# Patient Record
Sex: FEMALE | Race: WHITE | Employment: OTHER | URBAN - METROPOLITAN AREA
[De-identification: names, ages, dates, MRNs, and addresses within clinical notes are randomized per-mention and may not be internally consistent; named-entity substitution may affect disease eponyms.]

---

## 2022-06-12 ENCOUNTER — APPOINTMENT (OUTPATIENT)
Dept: GENERAL RADIOLOGY | Age: 77
DRG: 378 | End: 2022-06-12
Payer: MEDICARE

## 2022-06-12 ENCOUNTER — HOSPITAL ENCOUNTER (INPATIENT)
Age: 77
LOS: 6 days | Discharge: HOME | DRG: 378 | End: 2022-06-18
Attending: STUDENT IN AN ORGANIZED HEALTH CARE EDUCATION/TRAINING PROGRAM | Admitting: INTERNAL MEDICINE
Payer: MEDICARE

## 2022-06-12 DIAGNOSIS — D64.9 ANEMIA, UNSPECIFIED TYPE: Primary | ICD-10-CM

## 2022-06-12 PROBLEM — D72.829 LEUKOCYTOSIS (LEUCOCYTOSIS): Status: ACTIVE | Noted: 2022-06-12

## 2022-06-12 PROBLEM — J44.9 COPD (CHRONIC OBSTRUCTIVE PULMONARY DISEASE) (HCC): Status: ACTIVE | Noted: 2022-06-12

## 2022-06-12 PROBLEM — E03.9 HYPOTHYROIDISM: Status: ACTIVE | Noted: 2022-06-12

## 2022-06-12 LAB
ABSOLUTE EOS #: 0 K/UL (ref 0–0.4)
ABSOLUTE LYMPH #: 1.89 K/UL (ref 1–4.8)
ABSOLUTE MONO #: 1.05 K/UL (ref 0.1–1.3)
ABSOLUTE RETIC #: 0.2 M/UL (ref 0.02–0.1)
ALBUMIN SERPL-MCNC: 3.7 G/DL (ref 3.5–5.2)
ALLEN TEST: ABNORMAL
ALP BLD-CCNC: 76 U/L (ref 35–104)
ALT SERPL-CCNC: 21 U/L (ref 5–33)
ANION GAP SERPL CALCULATED.3IONS-SCNC: 13 MMOL/L (ref 9–17)
AST SERPL-CCNC: 15 U/L
BASOPHILS # BLD: 0 % (ref 0–2)
BASOPHILS ABSOLUTE: 0 K/UL (ref 0–0.2)
BILIRUB SERPL-MCNC: <0.15 MG/DL (ref 0.3–1.2)
BUN BLDV-MCNC: 50 MG/DL (ref 8–23)
C-REACTIVE PROTEIN: 4.2 MG/L (ref 0–5)
CALCIUM SERPL-MCNC: 9 MG/DL (ref 8.6–10.4)
CARBOXYHEMOGLOBIN: 5 % (ref 0–5)
CHLORIDE BLD-SCNC: 99 MMOL/L (ref 98–107)
CO2: 19 MMOL/L (ref 20–31)
CREAT SERPL-MCNC: 1 MG/DL (ref 0.5–0.9)
EOSINOPHILS RELATIVE PERCENT: 0 % (ref 0–4)
GFR AFRICAN AMERICAN: >60 ML/MIN
GFR NON-AFRICAN AMERICAN: 54 ML/MIN
GFR SERPL CREATININE-BSD FRML MDRD: ABNORMAL ML/MIN/{1.73_M2}
GLUCOSE BLD-MCNC: 134 MG/DL (ref 70–99)
HCO3 VENOUS: 19.3 MMOL/L (ref 24–30)
HCT VFR BLD CALC: 14.2 % (ref 36–46)
HCT VFR BLD CALC: 23.4 % (ref 36–46)
HEMOGLOBIN: 4.4 G/DL (ref 12–16)
HEMOGLOBIN: 8.1 G/DL (ref 12–16)
INFLUENZA A: NOT DETECTED
INFLUENZA B: NOT DETECTED
INR BLD: 1.1
LACTIC ACID: 1.8 MMOL/L (ref 0.5–2.2)
LYMPHOCYTES # BLD: 9 % (ref 24–44)
MCH RBC QN AUTO: 31.1 PG (ref 26–34)
MCHC RBC AUTO-ENTMCNC: 30.6 G/DL (ref 31–37)
MCV RBC AUTO: 101.7 FL (ref 80–100)
METHEMOGLOBIN: 0 % (ref 0–1.9)
MONOCYTES # BLD: 5 % (ref 1–7)
MORPHOLOGY: ABNORMAL
NEGATIVE BASE EXCESS, VEN: 6.3 MMOL/L (ref 0–2)
O2 SAT, VEN: 75.2 % (ref 60–85)
PARTIAL THROMBOPLASTIN TIME: 36 SEC (ref 24–36)
PATIENT TEMP: 37
PCO2, VEN: 34.8 (ref 39–55)
PDW BLD-RTO: 15.4 % (ref 11.5–14.9)
PH VENOUS: 7.35 (ref 7.32–7.42)
PLATELET # BLD: 488 K/UL (ref 150–450)
PMV BLD AUTO: 7.1 FL (ref 6–12)
PO2, VEN: 46.3 (ref 30–50)
POTASSIUM SERPL-SCNC: 4.3 MMOL/L (ref 3.7–5.3)
PROTHROMBIN TIME: 14.2 SEC (ref 11.8–14.6)
RBC # BLD: 1.4 M/UL (ref 4–5.2)
RETIC %: 14.3 % (ref 0.5–2)
SAMPLE SITE: ABNORMAL
SARS-COV-2 RNA, RT PCR: NOT DETECTED
SEG NEUTROPHILS: 86 % (ref 36–66)
SEGMENTED NEUTROPHILS ABSOLUTE COUNT: 18.06 K/UL (ref 1.3–9.1)
SODIUM BLD-SCNC: 131 MMOL/L (ref 135–144)
SOURCE: NORMAL
SPECIMEN DESCRIPTION: NORMAL
THYROXINE, FREE: 1.09 NG/DL (ref 0.93–1.7)
TOTAL PROTEIN: 6.1 G/DL (ref 6.4–8.3)
TROPONIN, HIGH SENSITIVITY: 18 NG/L (ref 0–14)
TSH SERPL DL<=0.05 MIU/L-ACNC: 11.58 UIU/ML (ref 0.3–5)
WBC # BLD: 21 K/UL (ref 3.5–11)

## 2022-06-12 PROCEDURE — 82805 BLOOD GASES W/O2 SATURATION: CPT

## 2022-06-12 PROCEDURE — 6360000002 HC RX W HCPCS: Performed by: STUDENT IN AN ORGANIZED HEALTH CARE EDUCATION/TRAINING PROGRAM

## 2022-06-12 PROCEDURE — 93005 ELECTROCARDIOGRAM TRACING: CPT | Performed by: STUDENT IN AN ORGANIZED HEALTH CARE EDUCATION/TRAINING PROGRAM

## 2022-06-12 PROCEDURE — 84439 ASSAY OF FREE THYROXINE: CPT

## 2022-06-12 PROCEDURE — 71045 X-RAY EXAM CHEST 1 VIEW: CPT

## 2022-06-12 PROCEDURE — 85025 COMPLETE CBC W/AUTO DIFF WBC: CPT

## 2022-06-12 PROCEDURE — 86900 BLOOD TYPING SEROLOGIC ABO: CPT

## 2022-06-12 PROCEDURE — 85045 AUTOMATED RETICULOCYTE COUNT: CPT

## 2022-06-12 PROCEDURE — 87636 SARSCOV2 & INF A&B AMP PRB: CPT

## 2022-06-12 PROCEDURE — 86901 BLOOD TYPING SEROLOGIC RH(D): CPT

## 2022-06-12 PROCEDURE — 84443 ASSAY THYROID STIM HORMONE: CPT

## 2022-06-12 PROCEDURE — 99285 EMERGENCY DEPT VISIT HI MDM: CPT

## 2022-06-12 PROCEDURE — 36415 COLL VENOUS BLD VENIPUNCTURE: CPT

## 2022-06-12 PROCEDURE — C9113 INJ PANTOPRAZOLE SODIUM, VIA: HCPCS | Performed by: STUDENT IN AN ORGANIZED HEALTH CARE EDUCATION/TRAINING PROGRAM

## 2022-06-12 PROCEDURE — 86140 C-REACTIVE PROTEIN: CPT

## 2022-06-12 PROCEDURE — 85730 THROMBOPLASTIN TIME PARTIAL: CPT

## 2022-06-12 PROCEDURE — 6370000000 HC RX 637 (ALT 250 FOR IP): Performed by: INTERNAL MEDICINE

## 2022-06-12 PROCEDURE — P9016 RBC LEUKOCYTES REDUCED: HCPCS

## 2022-06-12 PROCEDURE — 2580000003 HC RX 258

## 2022-06-12 PROCEDURE — 6370000000 HC RX 637 (ALT 250 FOR IP)

## 2022-06-12 PROCEDURE — 85014 HEMATOCRIT: CPT

## 2022-06-12 PROCEDURE — 86920 COMPATIBILITY TEST SPIN: CPT

## 2022-06-12 PROCEDURE — 85610 PROTHROMBIN TIME: CPT

## 2022-06-12 PROCEDURE — C9113 INJ PANTOPRAZOLE SODIUM, VIA: HCPCS

## 2022-06-12 PROCEDURE — A4216 STERILE WATER/SALINE, 10 ML: HCPCS

## 2022-06-12 PROCEDURE — 96374 THER/PROPH/DIAG INJ IV PUSH: CPT

## 2022-06-12 PROCEDURE — 2060000000 HC ICU INTERMEDIATE R&B

## 2022-06-12 PROCEDURE — 85018 HEMOGLOBIN: CPT

## 2022-06-12 PROCEDURE — 6360000002 HC RX W HCPCS

## 2022-06-12 PROCEDURE — 86850 RBC ANTIBODY SCREEN: CPT

## 2022-06-12 PROCEDURE — 2580000003 HC RX 258: Performed by: STUDENT IN AN ORGANIZED HEALTH CARE EDUCATION/TRAINING PROGRAM

## 2022-06-12 PROCEDURE — 80053 COMPREHEN METABOLIC PANEL: CPT

## 2022-06-12 PROCEDURE — 83605 ASSAY OF LACTIC ACID: CPT

## 2022-06-12 PROCEDURE — 82800 BLOOD PH: CPT

## 2022-06-12 PROCEDURE — 84484 ASSAY OF TROPONIN QUANT: CPT

## 2022-06-12 RX ORDER — ACETAMINOPHEN 325 MG/1
650 TABLET ORAL EVERY 6 HOURS PRN
Status: DISCONTINUED | OUTPATIENT
Start: 2022-06-12 | End: 2022-06-18 | Stop reason: HOSPADM

## 2022-06-12 RX ORDER — SODIUM CHLORIDE 0.9 % (FLUSH) 0.9 %
5-40 SYRINGE (ML) INJECTION PRN
Status: DISCONTINUED | OUTPATIENT
Start: 2022-06-12 | End: 2022-06-18 | Stop reason: HOSPADM

## 2022-06-12 RX ORDER — IBUPROFEN 600 MG/1
600 TABLET ORAL EVERY 6 HOURS PRN
COMMUNITY
End: 2022-06-12

## 2022-06-12 RX ORDER — ATORVASTATIN CALCIUM 20 MG/1
20 TABLET, FILM COATED ORAL DAILY
COMMUNITY
End: 2022-06-22 | Stop reason: SDUPTHER

## 2022-06-12 RX ORDER — SODIUM CHLORIDE 9 MG/ML
INJECTION, SOLUTION INTRAVENOUS PRN
Status: COMPLETED | OUTPATIENT
Start: 2022-06-12 | End: 2022-06-13

## 2022-06-12 RX ORDER — LEVOTHYROXINE SODIUM 88 UG/1
88 TABLET ORAL DAILY
Status: DISCONTINUED | OUTPATIENT
Start: 2022-06-13 | End: 2022-06-18 | Stop reason: HOSPADM

## 2022-06-12 RX ORDER — AMLODIPINE BESYLATE 5 MG/1
5 TABLET ORAL DAILY
Status: CANCELLED | OUTPATIENT
Start: 2022-06-13

## 2022-06-12 RX ORDER — ONDANSETRON 2 MG/ML
4 INJECTION INTRAMUSCULAR; INTRAVENOUS EVERY 6 HOURS PRN
Status: DISCONTINUED | OUTPATIENT
Start: 2022-06-12 | End: 2022-06-18 | Stop reason: HOSPADM

## 2022-06-12 RX ORDER — ALBUTEROL SULFATE 2.5 MG/3ML
2.5 SOLUTION RESPIRATORY (INHALATION) EVERY 6 HOURS PRN
Status: DISCONTINUED | OUTPATIENT
Start: 2022-06-12 | End: 2022-06-18 | Stop reason: HOSPADM

## 2022-06-12 RX ORDER — 0.9 % SODIUM CHLORIDE 0.9 %
1000 INTRAVENOUS SOLUTION INTRAVENOUS ONCE
Status: COMPLETED | OUTPATIENT
Start: 2022-06-12 | End: 2022-06-12

## 2022-06-12 RX ORDER — ALBUTEROL SULFATE 2.5 MG/3ML
2.5 SOLUTION RESPIRATORY (INHALATION) EVERY 6 HOURS PRN
Status: ON HOLD | COMMUNITY
End: 2022-06-18 | Stop reason: SDUPTHER

## 2022-06-12 RX ORDER — SODIUM CHLORIDE 9 MG/ML
INJECTION, SOLUTION INTRAVENOUS PRN
Status: DISCONTINUED | OUTPATIENT
Start: 2022-06-12 | End: 2022-06-18 | Stop reason: HOSPADM

## 2022-06-12 RX ORDER — LISINOPRIL 10 MG/1
10 TABLET ORAL DAILY
COMMUNITY
End: 2022-06-22 | Stop reason: SDUPTHER

## 2022-06-12 RX ORDER — SODIUM CHLORIDE 9 MG/ML
INJECTION, SOLUTION INTRAVENOUS CONTINUOUS
Status: DISCONTINUED | OUTPATIENT
Start: 2022-06-12 | End: 2022-06-18 | Stop reason: HOSPADM

## 2022-06-12 RX ORDER — LANOLIN ALCOHOL/MO/W.PET/CERES
3 CREAM (GRAM) TOPICAL NIGHTLY PRN
Status: DISCONTINUED | OUTPATIENT
Start: 2022-06-12 | End: 2022-06-18 | Stop reason: HOSPADM

## 2022-06-12 RX ORDER — POLYETHYLENE GLYCOL 3350 17 G/17G
17 POWDER, FOR SOLUTION ORAL DAILY PRN
Status: DISCONTINUED | OUTPATIENT
Start: 2022-06-12 | End: 2022-06-18 | Stop reason: HOSPADM

## 2022-06-12 RX ORDER — LEVOTHYROXINE SODIUM 88 UG/1
88 TABLET ORAL DAILY
COMMUNITY
End: 2022-06-22 | Stop reason: SDUPTHER

## 2022-06-12 RX ORDER — ONDANSETRON 4 MG/1
4 TABLET, ORALLY DISINTEGRATING ORAL EVERY 8 HOURS PRN
Status: DISCONTINUED | OUTPATIENT
Start: 2022-06-12 | End: 2022-06-18 | Stop reason: HOSPADM

## 2022-06-12 RX ORDER — AMLODIPINE BESYLATE 5 MG/1
5 TABLET ORAL DAILY
COMMUNITY
End: 2022-06-22 | Stop reason: SDUPTHER

## 2022-06-12 RX ORDER — ATORVASTATIN CALCIUM 20 MG/1
20 TABLET, FILM COATED ORAL DAILY
Status: DISCONTINUED | OUTPATIENT
Start: 2022-06-12 | End: 2022-06-18 | Stop reason: HOSPADM

## 2022-06-12 RX ORDER — ACETAMINOPHEN 650 MG/1
650 SUPPOSITORY RECTAL EVERY 6 HOURS PRN
Status: DISCONTINUED | OUTPATIENT
Start: 2022-06-12 | End: 2022-06-18 | Stop reason: HOSPADM

## 2022-06-12 RX ORDER — LISINOPRIL 20 MG/1
10 TABLET ORAL DAILY
Status: CANCELLED | OUTPATIENT
Start: 2022-06-13

## 2022-06-12 RX ORDER — PANTOPRAZOLE SODIUM 40 MG/1
40 TABLET, DELAYED RELEASE ORAL
Status: DISCONTINUED | OUTPATIENT
Start: 2022-06-15 | End: 2022-06-12

## 2022-06-12 RX ORDER — SODIUM CHLORIDE 0.9 % (FLUSH) 0.9 %
5-40 SYRINGE (ML) INJECTION EVERY 12 HOURS SCHEDULED
Status: DISCONTINUED | OUTPATIENT
Start: 2022-06-12 | End: 2022-06-18 | Stop reason: HOSPADM

## 2022-06-12 RX ADMIN — SODIUM CHLORIDE 40 MG: 9 INJECTION INTRAMUSCULAR; INTRAVENOUS; SUBCUTANEOUS at 23:00

## 2022-06-12 RX ADMIN — SODIUM CHLORIDE: 9 INJECTION, SOLUTION INTRAVENOUS at 22:57

## 2022-06-12 RX ADMIN — Medication 3 MG: at 23:07

## 2022-06-12 RX ADMIN — SODIUM CHLORIDE 1000 ML: 9 INJECTION, SOLUTION INTRAVENOUS at 13:56

## 2022-06-12 RX ADMIN — PANTOPRAZOLE SODIUM 80 MG: 40 INJECTION, POWDER, FOR SOLUTION INTRAVENOUS at 16:28

## 2022-06-12 RX ADMIN — CEFTRIAXONE SODIUM 1000 MG: 1 INJECTION, POWDER, FOR SOLUTION INTRAMUSCULAR; INTRAVENOUS at 22:13

## 2022-06-12 RX ADMIN — PANTOPRAZOLE SODIUM 8 MG/HR: 40 INJECTION, POWDER, FOR SOLUTION INTRAVENOUS at 16:50

## 2022-06-12 ASSESSMENT — ENCOUNTER SYMPTOMS
COUGH: 0
NAUSEA: 0
ABDOMINAL PAIN: 0
DIARRHEA: 0
BACK PAIN: 1
ABDOMINAL DISTENTION: 0
ANAL BLEEDING: 0
BLOOD IN STOOL: 1
RHINORRHEA: 0
SHORTNESS OF BREATH: 1
VOMITING: 0
ABDOMINAL PAIN: 1

## 2022-06-12 ASSESSMENT — PAIN DESCRIPTION - PAIN TYPE: TYPE: ACUTE PAIN

## 2022-06-12 ASSESSMENT — PAIN DESCRIPTION - ORIENTATION: ORIENTATION: LOWER;MID

## 2022-06-12 ASSESSMENT — PAIN - FUNCTIONAL ASSESSMENT: PAIN_FUNCTIONAL_ASSESSMENT: 0-10

## 2022-06-12 ASSESSMENT — PAIN DESCRIPTION - DESCRIPTORS: DESCRIPTORS: DISCOMFORT

## 2022-06-12 ASSESSMENT — PAIN DESCRIPTION - LOCATION: LOCATION: BACK

## 2022-06-12 ASSESSMENT — PAIN DESCRIPTION - FREQUENCY: FREQUENCY: CONTINUOUS

## 2022-06-12 ASSESSMENT — PAIN SCALES - GENERAL: PAINLEVEL_OUTOF10: 4

## 2022-06-12 NOTE — PROGRESS NOTES
Pt arrived to floor from ED to room 2112. Vitals taken and telemetry applied. No distress noted. Family at bedside. Second unit of blood infusing. Call light within reach, and pt educated on its use. Bed in lowest position, and locked. Side rails up x 2. Denied further questions or needs at this time. Will continue to monitor.

## 2022-06-12 NOTE — ED PROVIDER NOTES
level: Not on file   Occupational History    Not on file   Tobacco Use    Smoking status: Current Every Day Smoker     Types: Cigarettes    Smokeless tobacco: Never Used   Substance and Sexual Activity    Alcohol use: Yes     Alcohol/week: 2.0 standard drinks     Types: 2 Glasses of wine per week    Drug use: Never    Sexual activity: Not on file   Other Topics Concern    Not on file   Social History Narrative    Not on file     Social Determinants of Health     Financial Resource Strain:     Difficulty of Paying Living Expenses: Not on file   Food Insecurity:     Worried About Running Out of Food in the Last Year: Not on file    Trung of Food in the Last Year: Not on file   Transportation Needs:     Lack of Transportation (Medical): Not on file    Lack of Transportation (Non-Medical): Not on file   Physical Activity:     Days of Exercise per Week: Not on file    Minutes of Exercise per Session: Not on file   Stress:     Feeling of Stress : Not on file   Social Connections:     Frequency of Communication with Friends and Family: Not on file    Frequency of Social Gatherings with Friends and Family: Not on file    Attends Restoration[employer] Services: Not on file    Active Member of [address] [place] [address] or Organizations: Not on file    Attends Club or Organization Meetings: Not on file    Marital Status: Not on file   Intimate Partner Violence:     Fear of Current or Ex-Partner: Not on file    Emotionally Abused: Not on file    Physically Abused: Not on file    Sexually Abused: Not on file   Housing Stability:     Unable to Pay for Housing in the Last Year: Not on file    Number of [place] in the Last Year: Not on file    Unstable Housing in the Last Year: Not on file       History reviewed. No pertinent family history. Allergies:    Patient has no known allergies. Home Medications:  Prior to Admission medications    Medication Sig Start Date End Date Taking?  Authorizing Provider   levothyroxine (SYNTHROID) 88 MCG tablet Take 88 mcg by mouth Daily   Yes Historical Provider, MD   amLODIPine (NORVASC) 5 MG tablet Take 5 mg by mouth daily   Yes Historical Provider, MD   atorvastatin (LIPITOR) 20 MG tablet Take 20 mg by mouth daily   Yes Historical Provider, MD   lisinopril (PRINIVIL;ZESTRIL) 10 MG tablet Take 10 mg by mouth daily   Yes Historical Provider, MD   tiotropium (SPIRIVA) 18 MCG inhalation capsule Inhale 18 mcg into the lungs daily   Yes Historical Provider, MD   albuterol (PROVENTIL) (2.5 MG/3ML) 0.083% nebulizer solution Take 2.5 mg by nebulization every 6 hours as needed for Wheezing   Yes Historical Provider, MD       REVIEW OF SYSTEMS    (2-9 systems for level 4, 10 or more for level 5)    Review of Systems   Constitutional: Positive for appetite change, chills and fatigue. Negative for fever. HENT: Negative for congestion and rhinorrhea. Respiratory: Positive for shortness of breath. Negative for cough. Cardiovascular: Negative for chest pain. Gastrointestinal: Positive for blood in stool. Negative for abdominal pain, anal bleeding, nausea and vomiting. Genitourinary: Negative for dysuria and flank pain. Musculoskeletal: Positive for back pain. Negative for myalgias. Skin: Positive for pallor. Neurological: Negative for headaches. All other systems reviewed and are negative. PHYSICAL EXAM   (up to 7 for level 4, 8 or more for level 5)    INITIAL VITALS:   ED Triage Vitals   BP Temp Temp Source Heart Rate Resp SpO2 Height Weight   06/12/22 1340 06/12/22 1346 06/12/22 1346 06/12/22 1340 06/12/22 1340 06/12/22 1340 06/12/22 1346 06/12/22 1346   (!) 115/54 98.2 °F (36.8 °C) Oral 88 26 99 % 5' 2\" (1.575 m) 125 lb (56.7 kg)       Physical Exam  Vitals and nursing note reviewed. Constitutional:       General: She is not in acute distress. Appearance: She is well-developed. She is ill-appearing. She is not toxic-appearing.    HENT:      Right Ear: A middle ear effusion is present. Tympanic membrane is bulging. Left Ear:  No middle ear effusion. Tympanic membrane is bulging. Cardiovascular:      Rate and Rhythm: Normal rate. Rhythm irregular. Extrasystoles are present. Pulses:           Radial pulses are 2+ on the right side and 2+ on the left side. Posterior tibial pulses are 2+ on the right side and 2+ on the left side. Pulmonary:      Effort: Pulmonary effort is normal. No respiratory distress. Breath sounds: Wheezing present. No decreased breath sounds, rhonchi or rales. Comments: Scattered wheezing throughout all lung fields  Abdominal:      General: Bowel sounds are normal. There is no distension. Palpations: Abdomen is soft. Tenderness: There is no abdominal tenderness. Musculoskeletal:      Right lower leg: No edema. Left lower leg: No edema. Skin:     General: Skin is warm and dry. Capillary Refill: Capillary refill takes 2 to 3 seconds. Coloration: Skin is pale. Neurological:      Mental Status: She is alert and oriented to person, place, and time. Psychiatric:         Behavior: Behavior is cooperative. DIFFERENTIAL  DIAGNOSIS   PLAN (LABS / IMAGING / EKG):  Orders Placed This Encounter   Procedures    COVID-19 & Influenza Combo    XR CHEST PORTABLE    US RENAL COMPLETE    CBC with Auto Differential    Comprehensive Metabolic Panel    Troponin    Protime-INR    APTT    OCCULT BLOOD SCREEN    Blood Gas, Venous    Lactic Acid    TSH with Reflex    Path Review, Smear    T4, Free    Urinalysis with Reflex to Culture    Hemoglobin and Hematocrit    Reticulocytes    Basic Metabolic Panel w/ Reflex to MG    CBC with Auto Differential    Hemoglobin and Hematocrit    Troponin    C-Reactive Protein    Iron and TIBC    Ferritin    Hemoglobin and Hematocrit    ADULT DIET;  Clear Liquid    Diet NPO Exceptions are: Ice Chips, Sips of Water with Meds    Verify hospital blood product consent form has been signed and witnessed    Vital Signs For Blood Product Transfusion    Transfusion Reaction Management    Vital signs per unit routine    Telemetry monitoring - continuous duration    Up as tolerated    Full Code    Inpatient consult to Internal Medicine    Inpatient consult to GI    Inpatient consult to Social Work    OT eval and treat    PT evaluation and treat    Initiate Oxygen Therapy Protocol    EKG 12 Lead    TYPE AND SCREEN    PREPARE RBC (CROSSMATCH), 4 Units    ADMIT TO INPATIENT       MEDICATIONS ORDERED:  Orders Placed This Encounter   Medications    0.9 % sodium chloride bolus    0.9 % sodium chloride infusion    pantoprazole (PROTONIX) 80 mg in sodium chloride 0.9 % 50 mL bolus    DISCONTD: pantoprazole (PROTONIX) 80 mg in sodium chloride 0.9 % 100 mL infusion    sodium chloride flush 0.9 % injection 5-40 mL    sodium chloride flush 0.9 % injection 5-40 mL    0.9 % sodium chloride infusion    OR Linked Order Group     ondansetron (ZOFRAN-ODT) disintegrating tablet 4 mg     ondansetron (ZOFRAN) injection 4 mg    polyethylene glycol (GLYCOLAX) packet 17 g    OR Linked Order Group     acetaminophen (TYLENOL) tablet 650 mg     acetaminophen (TYLENOL) suppository 650 mg    DISCONTD: pantoprazole (PROTONIX) tablet 40 mg    cefTRIAXone (ROCEPHIN) 1000 mg IVPB in NS 50ml minibag     Order Specific Question:   Antimicrobial Indications     Answer:    Other     Order Specific Question:   Other Abx Indication     Answer:   Elevated WBC of unkown cause    albuterol (PROVENTIL) nebulizer solution 2.5 mg     Order Specific Question:   Initiate RT Bronchodilator Protocol     Answer:   No    atorvastatin (LIPITOR) tablet 20 mg    levothyroxine (SYNTHROID) tablet 88 mcg    tiotropium (SPIRIVA RESPIMAT) 2.5 MCG/ACT inhaler 2 puff    0.9 % sodium chloride infusion    DISCONTD: pantoprazole (PROTONIX) 40 mg in sodium chloride (PF) 10 mL injection    pantoprazole (PROTONIX) 40 mg in sodium chloride (PF) 10 mL injection         DIAGNOSTIC RESULTS / EMERGENCYDEPARTMENT COURSE / MDM   LABS:  Labs Reviewed   CBC WITH AUTO DIFFERENTIAL - Abnormal; Notable for the following components:       Result Value    WBC 21.0 (*)     RBC 1.40 (*)     Hemoglobin 4.4 (*)     Hematocrit 14.2 (*)     .7 (*)     MCHC 30.6 (*)     RDW 15.4 (*)     Platelets 266 (*)     Seg Neutrophils 86 (*)     Lymphocytes 9 (*)     Segs Absolute 18.06 (*)     All other components within normal limits   COMPREHENSIVE METABOLIC PANEL - Abnormal; Notable for the following components:    Glucose 134 (*)     BUN 50 (*)     CREATININE 1.00 (*)     Sodium 131 (*)     CO2 19 (*)     Total Bilirubin <0.15 (*)     Total Protein 6.1 (*)     GFR Non- 54 (*)     All other components within normal limits   TROPONIN - Abnormal; Notable for the following components:    Troponin, High Sensitivity 18 (*)     All other components within normal limits   BLOOD GAS, VENOUS - Abnormal; Notable for the following components:    pCO2, Jorge 34.8 (*)     HCO3, Venous 19.3 (*)     Negative Base Excess, Jorge 6.3 (*)     All other components within normal limits   TSH WITH REFLEX - Abnormal; Notable for the following components:    TSH 11.58 (*)     All other components within normal limits   RETICULOCYTES - Abnormal; Notable for the following components:    Retic % 14.3 (*)     Absolute Retic # 0.195 (*)     All other components within normal limits   COVID-19 & INFLUENZA COMBO   PROTIME-INR   APTT   LACTIC ACID   T4, FREE   C-REACTIVE PROTEIN   OCCULT BLOOD SCREEN   PERIPHERAL BLOOD SMEAR, PATH REVIEW   URINALYSIS WITH REFLEX TO CULTURE   BASIC METABOLIC PANEL W/ REFLEX TO MG FOR LOW K   CBC WITH AUTO DIFFERENTIAL   HEMOGLOBIN AND HEMATOCRIT   TROPONIN   IRON AND TIBC   FERRITIN   HEMOGLOBIN AND HEMATOCRIT   TYPE AND SCREEN   PREPARE RBC (CROSSMATCH)       RADIOLOGY:  XR CHEST PORTABLE    Result Date: 6/12/2022  EXAMINATION: ONE XRAY VIEW OF THE CHEST 6/12/2022 2:00 pm COMPARISON: None. HISTORY: ORDERING SYSTEM PROVIDED HISTORY: shortenss of breath, hx copd TECHNOLOGIST PROVIDED HISTORY: shortenss of breath, hx copd FINDINGS: Lungs are hyperinflated suggesting COPD. No focal consolidation. No pneumothorax or pleural effusion. Cardiac and mediastinal silhouettes unremarkable. No acute osseous abnormality. Generalized osteopenia. Telemetry leads overlie the chest.     COPD. No acute findings. EKG    EKG Interpretation    Interpreted by me    Rhythm: normal sinus   Rate: normal, 85  Axis: normal  Ectopy: none  Conduction: normal  ST Segments: no acute change  T Waves: Flattening in multiple leads, upright T waves in V1  Q Waves: none    Clinical Impression: Sinus rhythm rate of 85. No ST segment changes, no arrhythmia, no ectopy. Normal axis, good R wave progression. Upright T waves V1, larger than T wave in V6        All EKG's are interpreted by the Emergency Department Physician whoeither signs or Co-signs this chart in the absence of a cardiologist.    Impression:  Patient presents with fatigue, shortness of breath, weakness. Worsening over the past few days. Patient is quite pale. We did have difficulty obtaining pulse ox on patient but on 3 L nasal cannula pulse ox is in the high 90s. Off of nasal cannula, pulse ox drops into the low 90s/high 80s. She does have conjunctival pallor as well as diffuse pallor. Not tachycardic, not tachypneic. Does have scattered wheezing. Endorses a strain to her back several weeks ago has been taking Motrin since then. Concern for NSAID overuse, possible GI bleed as well. Lab work-up, chest x-ray, bedside abdominal ultrasound and reassessment. Likely admission      EMERGENCY DEPARTMENT COURSE:  ED Course as of 06/12/22 2117   Sun Jun 12, 2022   1422 Slightly elevated carboxyhemoglobin however patient is an active smoker. Hemoglobin 4.4, plan for transfusion and admission.   Bedside FAST exam negative. Right kidney demonstrating either severe hydronephrosis versus multiple complex renal cysts. [AP]   1520 SARS-CoV-2 RNA, RT PCR: Not Detected [AP]   5004 INFLUENZA A: Not Detected [AP]   4160 EOHBTMJOR B: Not Detected [AP]      ED Course User Index  [AP] Maximilian Drew DO     Hemoglobin low, order transfusion. Spoke with internal medicine who accepted patient to her service. Started patient on Protonix infusion. Has no history of cirrhosis therefore octreotide was not ordered. Patient remained hemodynamically stable and then was admitted to the floor. PROCEDURES:  FAST EXAM:    A limited, bedside FAST exam was performed. The medical necessity was to evaluate for the presence or absence of intraperitoneal or pericardial fluid. The structures studied were the hepatorenal space, splenorenal space, pericardium, and bladder. FINDINGS:  Negative for free intra-abdominal fluid. The study was technically adequate. IMAGES:  Electronically uploaded to the PACS system    RENAL ULTRASOUND:     A limited, bedside ultrasound of the kidneys was performed. The medical necessity was to evaluate for hydronephrosis in a patient with possible renal colic. The structures studied were the kidney, including the renal cortex and collecting system. FINDINGS:    negative for left hydronephrosis. Either hydronephrosis vs multiple complex renal cysts on right  The study was technically adequate. IMAGES:  Electronically uploaded to the PACS system      CONSULTS:  [place] TO GI  IP CONSULT TO SOCIAL WORK    CRITICAL CARE:  There was a high probability of clinically significant/life threatening deterioration in this patient's condition which required my urgent intervention. Total critical care time was 20 minutes. This excludes any time for separately reportable procedures. FINAL IMPRESSION     1.  Anemia, unspecified type          DISPOSITION / PLAN DISPOSITION Admitted 06/12/2022 04:41:53 PM      PATIENT REFERRED TO:  No follow-up provider specified.     DISCHARGE MEDICATIONS:  Current Discharge Medication List          Kieran Vitale DO  Emergency Medicine Attending    (Please note that portions of this note were completed with a voice recognition program.  Efforts were made to edit the dictations but occasionally words are mis-transcribed.)         Kieran Vitale DO  06/12/22 3013

## 2022-06-12 NOTE — CONSENT
Informed Consent for Blood Component Transfusion Note    I have discussed with the patient the rationale for blood component transfusion; its benefits in treating or preventing fatigue, organ damage, or death; and its risk which includes mild transfusion reactions, rare risk of blood borne infection, or more serious but rare reactions. I have discussed the alternatives to transfusion, including the risk and consequences of not receiving transfusion. The patient had an opportunity to ask questions and had agreed to proceed with transfusion of blood components.     Electronically signed by Ronald Alan DO on 6/12/22 at 3:43 PM EDT

## 2022-06-12 NOTE — H&P
[address]      [address] [place] [address]     HISTORY AND PHYSICAL EXAMINATION            Date:   6/12/2022  Patient name:  Kaci Parker  Date of admission:  6/12/2022  1:35 PM  MRN:   [id number]  Account:  [de-identified]  YOB: 1945  PCP:    Delfino Celaya MD  Room:   06/06  Code Status:    No Order    Chief Complaint:     Chief Complaint   Patient presents with    Fatigue    Shortness of Breath       History Obtained From:     patient    History of Present Illness: The patient is a 65 y.o. Non- / non  female who with past medical history of hypertension, COPD, hypothyroidism presents with abdominal pain and fatigue that started around a month ago. Patient said that she started to take Motrin for her back pain around the beginning of May. Since then, she has been having stomach pain that started after she takes the Motrin and lasts for couple of hours. Patient said that she was taking ibuprofen 600 more than 1 time a day, alternating with Tylenol. She also reported melena, headache, dizziness, shortness of breath, fatigue. Denies any fever, chills, chest pain, hematemesis, diarrhea or constipation. Patient said that she went to [facility] days ago and was discharged from the ED. She had a negative head CT scan at that time. At ER, patient is vitally stable. Hemoglobin was found to be 4.4, WBC 21, normal platelet. Patient received 1 unit of PRBC with Protonix bolus in the ED.   bedside renal ultrasound showed hydronephrosis versus renal cysts.   Troponin 18.   and she is admitted to the hospital for the management of anemia likely secondary to upper GI bleeding        Past Medical History:     Past Medical History:   Diagnosis Date    COPD (chronic obstructive pulmonary disease) ([facility] [address]     Hyperlipidemia     Hypertension     Kidney stone     Thyroid disease         Past SurgicalHistory:     History reviewed. No pertinent surgical history. Medications Prior to Admission:        Prior to Admission medications    Medication Sig Start Date End Date Taking? Authorizing Provider   levothyroxine (SYNTHROID) 88 MCG tablet Take 88 mcg by mouth Daily   Yes Historical Provider, MD   amLODIPine (NORVASC) 5 MG tablet Take 5 mg by mouth daily   Yes Historical Provider, MD   atorvastatin (LIPITOR) 20 MG tablet Take 20 mg by mouth daily   Yes Historical Provider, MD   lisinopril (PRINIVIL;ZESTRIL) 10 MG tablet Take 10 mg by mouth daily   Yes Historical Provider, MD   tiotropium (SPIRIVA) 18 MCG inhalation capsule Inhale 18 mcg into the lungs daily   Yes Historical Provider, MD   albuterol (PROVENTIL) (2.5 MG/3ML) 0.083% nebulizer solution Take 2.5 mg by nebulization every 6 hours as needed for Wheezing   Yes Historical Provider, MD        Allergies:     Patient has no known allergies. Social History:     Tobacco:    reports that she has been smoking cigarettes. She has never used smokeless tobacco.  Alcohol:      reports current alcohol use of about 2.0 standard drinks of alcohol per week. Drug Use:  reports no history of drug use. Family History:     History reviewed. No pertinent family history. Review of Systems:     Positive and Negative as described in HPI. Review of Systems   Constitutional: Positive for fatigue. Negative for chills and fever. Respiratory: Positive for shortness of breath. Negative for cough. Cardiovascular: Negative for chest pain, palpitations and leg swelling. Gastrointestinal: Positive for abdominal pain. Negative for abdominal distention, diarrhea, nausea and vomiting. Genitourinary: Negative for dysuria, flank pain, frequency and hematuria. Skin: Positive for pallor. Neurological: Positive for dizziness and headaches. Negative for weakness and numbness.    Psychiatric/Behavioral: Negative for Platelets 473 (H) 683 - 450 k/uL    MPV 7.1 6.0 - 12.0 fL    Seg Neutrophils 86 (H) 36 - 66 %    Lymphocytes 9 (L) 24 - 44 %    Monocytes 5 1 - 7 %    Eosinophils % 0 0 - 4 %    Basophils 0 0 - 2 %    Segs Absolute 18.06 (H) 1.3 - 9.1 k/uL    Absolute Lymph # 1.89 1.0 - 4.8 k/uL    Absolute Mono # 1.05 0.1 - 1.3 k/uL    Absolute Eos # 0.00 0.0 - 0.4 k/uL    Basophils Absolute 0.00 0.0 - 0.2 k/uL    Morphology ANISOCYTOSIS PRESENT     Morphology HYPOCHROMIA PRESENT     Morphology MACROCYTOSIS PRESENT     Morphology 1+ ELLIPTOCYTES     Morphology 1+ POLYCHROMASIA    Comprehensive Metabolic Panel    Collection Time: 06/12/22  1:35 PM   Result Value Ref Range    Glucose 134 (H) 70 - 99 mg/dL    BUN 50 (H) 8 - 23 mg/dL    CREATININE 1.00 (H) 0.50 - 0.90 mg/dL    Calcium 9.0 8.6 - 10.4 mg/dL    Sodium 131 (L) 135 - 144 mmol/L    Potassium 4.3 3.7 - 5.3 mmol/L    Chloride 99 98 - 107 mmol/L    CO2 19 (L) 20 - 31 mmol/L    Anion Gap 13 9 - 17 mmol/L    Alkaline Phosphatase 76 35 - 104 U/L    ALT 21 5 - 33 U/L    AST 15 <32 U/L    Total Bilirubin <0.15 (L) 0.3 - 1.2 mg/dL    Total Protein 6.1 (L) 6.4 - 8.3 g/dL    Albumin 3.7 3.5 - 5.2 g/dL    GFR Non- 54 (L) >60 mL/min    GFR African American >60 >60 mL/min    GFR Comment         Troponin    Collection Time: 06/12/22  1:35 PM   Result Value Ref Range    Troponin, High Sensitivity 18 (H) 0 - 14 ng/L   Protime-INR    Collection Time: 06/12/22  1:35 PM   Result Value Ref Range    Protime 14.2 11.8 - 14.6 sec    INR 1.1    APTT    Collection Time: 06/12/22  1:35 PM   Result Value Ref Range    PTT 36.0 24.0 - 36.0 sec   Lactic Acid    Collection Time: 06/12/22  1:35 PM   Result Value Ref Range    Lactic Acid 1.8 0.5 - 2.2 mmol/L   TSH with Reflex    Collection Time: 06/12/22  1:35 PM   Result Value Ref Range    TSH 11.58 (H) 0.30 - 5.00 uIU/mL   T4, Free    Collection Time: 06/12/22  1:35 PM   Result Value Ref Range    Thyroxine, Free 1.09 0.93 - 1.70 ng/dL Reticulocytes    Collection Time: 06/12/22  1:35 PM   Result Value Ref Range    Retic % 14.3 (H) 0.5 - 2.0 %    Absolute Retic # 0.195 (H) 0.0245 - 0.098 M/uL   TYPE AND SCREEN    Collection Time: 06/12/22  1:51 PM   Result Value Ref Range    Expiration Date 06/15/2022,2359     Arm Band Number [id number]     ABO/Rh A POSITIVE     Antibody Screen NEGATIVE     Blood Bank Comment ABORH CONFIRMED AS A POS: 403     Blood Bank Comment Results Verified     Unit Number [id number]     Product Code Leukocyte Reduced Red Cell     Unit Divison 00     Dispense Status ISSUED     Unit Issue Date/Time 500800911092     Product Code Blood Bank [id number]     Blood Bank Unit Type and Rh A POS     Blood Bank ISBT Product Blood Type 6200     Blood Bank Blood Product Expiration Date 241434879547     Transfusion Status OK TO TRANSFUSE     Crossmatch Result COMPATIBLE     Unit Number [id number]     Product Code Leukocyte Reduced Red Cell     Unit Divison 00     Dispense Status ALLOCATED     Transfusion Status OK TO TRANSFUSE     Crossmatch Result COMPATIBLE     Unit Number [id number]     Product Code Leukocyte Reduced Red Cell     Unit Divison 00     Dispense Status ISSUED     Unit Issue Date/Time 644954180220     Product Code Blood Bank [id number]     Blood Bank Unit Type and Rh A POS     Blood Bank ISBT Product Blood Type 6200     Blood Bank Blood Product Expiration Date 596151771005     Transfusion Status OK TO TRANSFUSE     Crossmatch Result COMPATIBLE    EKG 12 Lead    Collection Time: 06/12/22  1:52 PM   Result Value Ref Range    Ventricular Rate 85 BPM    Atrial Rate 85 BPM    P-R Interval 136 ms    QRS Duration 68 ms    Q-T Interval 376 ms    QTc Calculation (Bazett) 447 ms    P Axis 50 degrees    R Axis 58 degrees    T Axis 75 degrees   Blood Gas, Venous    Collection Time: 06/12/22  2:00 PM   Result Value Ref Range    pH, Jorge 7.352 7.320 - 7.420    pCO2, Jorge 34.8 (L) 39.0 - 55.0    pO2, Jorge 46.3 30.0 - 50.0    HCO3, Venous 19.3 (L) 24.0 - 30.0 mmol/L    Negative Base Excess, Jorge 6.3 (H) 0.0 - 2.0 mmol/L    O2 Sat, Jorge 75.2 60.0 - 85.0 %    Carboxyhemoglobin 5.0 0 - 5 %    Methemoglobin 0.0 0.0 - 1.9 %    Pt Temp 37.0     Aureliano Test NA     Sample Site NA    COVID-19 & Influenza Combo    Collection Time: 06/12/22  2:14 PM    Specimen: Nasopharyngeal Swab   Result Value Ref Range    Specimen Description . NASOPHARYNGEAL SWAB     Source . NASOPHARYNGEAL SWAB     SARS-CoV-2 RNA, RT PCR Not Detected Not Detected    INFLUENZA A Not Detected Not Detected    INFLUENZA B Not Detected Not Detected       Imaging/Diagnostics:  XR CHEST PORTABLE    Result Date: 6/12/2022  EXAMINATION: ONE XRAY VIEW OF THE CHEST 6/12/2022 2:00 pm COMPARISON: None. HISTORY: ORDERING SYSTEM PROVIDED HISTORY: shortenss of breath, hx copd TECHNOLOGIST PROVIDED HISTORY: shortenss of breath, hx copd FINDINGS: Lungs are hyperinflated suggesting COPD. No focal consolidation. No pneumothorax or pleural effusion. Cardiac and mediastinal silhouettes unremarkable. No acute osseous abnormality. Generalized osteopenia. Telemetry leads overlie the chest.     COPD. No acute findings. Assessment :      Primary Problem  Anemia    Active Hospital Problems    Diagnosis Date Noted    Anemia [D64.9] 06/12/2022     Priority: Medium    Leukocytosis (leucocytosis) [D72.829] 06/12/2022     Priority: Medium    Hypothyroidism [E03.9] 06/12/2022     Priority: Medium       Plan:     Patient status Admit as inpatient in the  Progressive Unit/Step down    Acute anemia likely secondary to upper GI bleeding due to NSAID use  -Patient uses Motrin multiple times daily. Last dose was this morning  -Hemoglobin is 4.4(baseline is 15)  -Elevated reticulocyte count  -Patient received 1 unit of PRBC in the ED  -H&H posttransfusion  -H&H at 10 PM  -FOBT  -Protonix 40 mg IV twice daily  -GI consulted  -Clear liquid diet.   N.p.o. at midnight      Leukocytosis  -WBC 21  -Normal lactate  -Negative chest x-ray  -We will order urinalysis, CRP  -IV Rocephin   -CBC tomorrow      History of hypothyroidism   -TSH is 11.5  -Continue home Synthroid 88 mcg Daily       History of hypertension  - will hold home medications due to low blood pressure    Diet: Liquid. N.p.o. at midnight  DVT prophylaxis: Pneumatic compression  GI prophylaxis: Protonix IV    Consultations:   IP CONSULT TO INTERNAL MEDICINE  IP CONSULT TO GI  IP CONSULT TO SOCIAL WORK     Patient is admitted as inpatient status because of co-morbiditieslisted above, severity of signs and symptoms as outlined, requirement for current medical therapies and most importantly because of direct risk to patient if care not provided in a hospital setting. Concetta Valdez MD  6/12/2022  5:38 PM    Copy sent to Dr. Galdino Marquez MD  Attending Physician Statement    I have discussed the case of Radha Darby, including pertinent history and exam findings with the resident. I have seen and examined the patient and the key elements of the encounter have been performed by me. I agree with the assessment, plan, and orders as documented by the resident.   Please see my comments on today's progress note  Electronically signed by Ban Acevedo MD on 6/13/2022 at 1:04 PM

## 2022-06-12 NOTE — PROGRESS NOTES
RN received report from ED nurse who stated that plan is for hgb recheck after 4th unit of PRBC is done infusing.

## 2022-06-13 ENCOUNTER — ANESTHESIA (OUTPATIENT)
Dept: ENDOSCOPY | Age: 77
DRG: 378 | End: 2022-06-13
Payer: MEDICARE

## 2022-06-13 ENCOUNTER — ANESTHESIA EVENT (OUTPATIENT)
Dept: ENDOSCOPY | Age: 77
DRG: 378 | End: 2022-06-13
Payer: MEDICARE

## 2022-06-13 ENCOUNTER — APPOINTMENT (OUTPATIENT)
Dept: GENERAL RADIOLOGY | Age: 77
DRG: 378 | End: 2022-06-13
Payer: MEDICARE

## 2022-06-13 PROBLEM — K26.9 DUODENAL ULCER: Status: ACTIVE | Noted: 2022-06-13

## 2022-06-13 PROBLEM — K92.2 UPPER GI BLEEDING: Status: ACTIVE | Noted: 2022-06-13

## 2022-06-13 LAB
ABSOLUTE EOS #: 0.3 K/UL (ref 0–0.4)
ABSOLUTE LYMPH #: 1.9 K/UL (ref 1–4.8)
ABSOLUTE MONO #: 1 K/UL (ref 0.1–1.3)
ANION GAP SERPL CALCULATED.3IONS-SCNC: 7 MMOL/L (ref 9–17)
BASOPHILS # BLD: 1 % (ref 0–2)
BASOPHILS ABSOLUTE: 0.1 K/UL (ref 0–0.2)
BILIRUBIN URINE: NEGATIVE
BUN BLDV-MCNC: 23 MG/DL (ref 8–23)
CALCIUM SERPL-MCNC: 8 MG/DL (ref 8.6–10.4)
CHLORIDE BLD-SCNC: 108 MMOL/L (ref 98–107)
CO2: 21 MMOL/L (ref 20–31)
COLOR: YELLOW
COMMENT UA: NORMAL
CREAT SERPL-MCNC: 0.73 MG/DL (ref 0.5–0.9)
DATE, STOOL #1: ABNORMAL
EKG ATRIAL RATE: 85 BPM
EKG P AXIS: 50 DEGREES
EKG P-R INTERVAL: 136 MS
EKG Q-T INTERVAL: 376 MS
EKG QRS DURATION: 68 MS
EKG QTC CALCULATION (BAZETT): 447 MS
EKG R AXIS: 58 DEGREES
EKG T AXIS: 75 DEGREES
EKG VENTRICULAR RATE: 85 BPM
EOSINOPHILS RELATIVE PERCENT: 3 % (ref 0–4)
FERRITIN: 91 NG/ML (ref 13–150)
FOLATE: >20 NG/ML
GFR AFRICAN AMERICAN: >60 ML/MIN
GFR NON-AFRICAN AMERICAN: >60 ML/MIN
GFR SERPL CREATININE-BSD FRML MDRD: ABNORMAL ML/MIN/{1.73_M2}
GLUCOSE BLD-MCNC: 98 MG/DL (ref 70–99)
GLUCOSE URINE: NEGATIVE
HCT VFR BLD CALC: 22.2 % (ref 36–46)
HCT VFR BLD CALC: 22.9 % (ref 36–46)
HCT VFR BLD CALC: 26.5 % (ref 36–46)
HEMOCCULT SP1 STL QL: POSITIVE
HEMOGLOBIN: 7.5 G/DL (ref 12–16)
HEMOGLOBIN: 7.7 G/DL (ref 12–16)
HEMOGLOBIN: 8.7 G/DL (ref 12–16)
IRON SATURATION: 22 % (ref 20–55)
IRON: 59 UG/DL (ref 37–145)
KETONES, URINE: NEGATIVE
LEUKOCYTE ESTERASE, URINE: NEGATIVE
LYMPHOCYTES # BLD: 16 % (ref 24–44)
MCH RBC QN AUTO: 31.2 PG (ref 26–34)
MCHC RBC AUTO-ENTMCNC: 33.8 G/DL (ref 31–37)
MCV RBC AUTO: 92.4 FL (ref 80–100)
MONOCYTES # BLD: 8 % (ref 1–7)
NITRITE, URINE: NEGATIVE
PDW BLD-RTO: 17 % (ref 11.5–14.9)
PH UA: 5 (ref 5–8)
PLATELET # BLD: 308 K/UL (ref 150–450)
PMV BLD AUTO: 6.7 FL (ref 6–12)
POTASSIUM SERPL-SCNC: 3.9 MMOL/L (ref 3.7–5.3)
PROTEIN UA: NEGATIVE
RBC # BLD: 2.4 M/UL (ref 4–5.2)
SEG NEUTROPHILS: 72 % (ref 36–66)
SEGMENTED NEUTROPHILS ABSOLUTE COUNT: 8.7 K/UL (ref 1.3–9.1)
SODIUM BLD-SCNC: 136 MMOL/L (ref 135–144)
SPECIFIC GRAVITY UA: 1.01 (ref 1–1.03)
SURGICAL PATHOLOGY REPORT: NORMAL
TIME, STOOL #1: ABNORMAL
TOTAL IRON BINDING CAPACITY: 266 UG/DL (ref 250–450)
TROPONIN, HIGH SENSITIVITY: 17 NG/L (ref 0–14)
TURBIDITY: CLEAR
UNSATURATED IRON BINDING CAPACITY: 207 UG/DL (ref 112–347)
URINE HGB: NEGATIVE
UROBILINOGEN, URINE: NORMAL
VITAMIN B-12: 503 PG/ML (ref 232–1245)
WBC # BLD: 12 K/UL (ref 3.5–11)

## 2022-06-13 PROCEDURE — 2700000000 HC OXYGEN THERAPY PER DAY

## 2022-06-13 PROCEDURE — A4216 STERILE WATER/SALINE, 10 ML: HCPCS

## 2022-06-13 PROCEDURE — 85014 HEMATOCRIT: CPT

## 2022-06-13 PROCEDURE — 3700000000 HC ANESTHESIA ATTENDED CARE: Performed by: INTERNAL MEDICINE

## 2022-06-13 PROCEDURE — 6370000000 HC RX 637 (ALT 250 FOR IP)

## 2022-06-13 PROCEDURE — 7100000011 HC PHASE II RECOVERY - ADDTL 15 MIN: Performed by: INTERNAL MEDICINE

## 2022-06-13 PROCEDURE — 2709999900 HC NON-CHARGEABLE SUPPLY: Performed by: INTERNAL MEDICINE

## 2022-06-13 PROCEDURE — 3609017100 HC EGD: Performed by: INTERNAL MEDICINE

## 2022-06-13 PROCEDURE — 6360000002 HC RX W HCPCS: Performed by: NURSE ANESTHETIST, CERTIFIED REGISTERED

## 2022-06-13 PROCEDURE — 83540 ASSAY OF IRON: CPT

## 2022-06-13 PROCEDURE — 99223 1ST HOSP IP/OBS HIGH 75: CPT | Performed by: INTERNAL MEDICINE

## 2022-06-13 PROCEDURE — 2580000003 HC RX 258: Performed by: STUDENT IN AN ORGANIZED HEALTH CARE EDUCATION/TRAINING PROGRAM

## 2022-06-13 PROCEDURE — 85025 COMPLETE CBC W/AUTO DIFF WBC: CPT

## 2022-06-13 PROCEDURE — 85018 HEMOGLOBIN: CPT

## 2022-06-13 PROCEDURE — 93010 ELECTROCARDIOGRAM REPORT: CPT | Performed by: INTERNAL MEDICINE

## 2022-06-13 PROCEDURE — 82746 ASSAY OF FOLIC ACID SERUM: CPT

## 2022-06-13 PROCEDURE — 2580000003 HC RX 258

## 2022-06-13 PROCEDURE — 84484 ASSAY OF TROPONIN QUANT: CPT

## 2022-06-13 PROCEDURE — 80048 BASIC METABOLIC PNL TOTAL CA: CPT

## 2022-06-13 PROCEDURE — 6370000000 HC RX 637 (ALT 250 FOR IP): Performed by: INTERNAL MEDICINE

## 2022-06-13 PROCEDURE — 99222 1ST HOSP IP/OBS MODERATE 55: CPT | Performed by: INTERNAL MEDICINE

## 2022-06-13 PROCEDURE — 2500000003 HC RX 250 WO HCPCS: Performed by: NURSE ANESTHETIST, CERTIFIED REGISTERED

## 2022-06-13 PROCEDURE — 82270 OCCULT BLOOD FECES: CPT

## 2022-06-13 PROCEDURE — 6360000002 HC RX W HCPCS: Performed by: INTERNAL MEDICINE

## 2022-06-13 PROCEDURE — 82607 VITAMIN B-12: CPT

## 2022-06-13 PROCEDURE — 0DJ08ZZ INSPECTION OF UPPER INTESTINAL TRACT, VIA NATURAL OR ARTIFICIAL OPENING ENDOSCOPIC: ICD-10-PCS | Performed by: INTERNAL MEDICINE

## 2022-06-13 PROCEDURE — 43235 EGD DIAGNOSTIC BRUSH WASH: CPT | Performed by: INTERNAL MEDICINE

## 2022-06-13 PROCEDURE — 82728 ASSAY OF FERRITIN: CPT

## 2022-06-13 PROCEDURE — 81003 URINALYSIS AUTO W/O SCOPE: CPT

## 2022-06-13 PROCEDURE — 83550 IRON BINDING TEST: CPT

## 2022-06-13 PROCEDURE — 7100000010 HC PHASE II RECOVERY - FIRST 15 MIN: Performed by: INTERNAL MEDICINE

## 2022-06-13 PROCEDURE — 2060000000 HC ICU INTERMEDIATE R&B

## 2022-06-13 PROCEDURE — 72110 X-RAY EXAM L-2 SPINE 4/>VWS: CPT

## 2022-06-13 PROCEDURE — 2580000003 HC RX 258: Performed by: INTERNAL MEDICINE

## 2022-06-13 PROCEDURE — 6360000002 HC RX W HCPCS

## 2022-06-13 PROCEDURE — 36415 COLL VENOUS BLD VENIPUNCTURE: CPT

## 2022-06-13 PROCEDURE — C9113 INJ PANTOPRAZOLE SODIUM, VIA: HCPCS

## 2022-06-13 PROCEDURE — C9113 INJ PANTOPRAZOLE SODIUM, VIA: HCPCS | Performed by: INTERNAL MEDICINE

## 2022-06-13 PROCEDURE — 94760 N-INVAS EAR/PLS OXIMETRY 1: CPT

## 2022-06-13 PROCEDURE — 3700000001 HC ADD 15 MINUTES (ANESTHESIA): Performed by: INTERNAL MEDICINE

## 2022-06-13 PROCEDURE — 94640 AIRWAY INHALATION TREATMENT: CPT

## 2022-06-13 RX ORDER — SUCRALFATE ORAL 1 G/10ML
1 SUSPENSION ORAL EVERY 6 HOURS SCHEDULED
Status: DISCONTINUED | OUTPATIENT
Start: 2022-06-13 | End: 2022-06-13 | Stop reason: CLARIF

## 2022-06-13 RX ORDER — PROPOFOL 10 MG/ML
INJECTION, EMULSION INTRAVENOUS PRN
Status: DISCONTINUED | OUTPATIENT
Start: 2022-06-13 | End: 2022-06-13 | Stop reason: SDUPTHER

## 2022-06-13 RX ORDER — SUCRALFATE 1 G/1
1 TABLET ORAL EVERY 6 HOURS SCHEDULED
Status: DISCONTINUED | OUTPATIENT
Start: 2022-06-13 | End: 2022-06-18 | Stop reason: HOSPADM

## 2022-06-13 RX ORDER — LIDOCAINE HYDROCHLORIDE 20 MG/ML
INJECTION, SOLUTION INFILTRATION; PERINEURAL PRN
Status: DISCONTINUED | OUTPATIENT
Start: 2022-06-13 | End: 2022-06-13 | Stop reason: SDUPTHER

## 2022-06-13 RX ADMIN — PANTOPRAZOLE SODIUM 8 MG/HR: 40 INJECTION, POWDER, FOR SOLUTION INTRAVENOUS at 16:45

## 2022-06-13 RX ADMIN — CEFTRIAXONE SODIUM 1000 MG: 1 INJECTION, POWDER, FOR SOLUTION INTRAMUSCULAR; INTRAVENOUS at 18:20

## 2022-06-13 RX ADMIN — SUCRALFATE 1 G: 1 TABLET ORAL at 18:20

## 2022-06-13 RX ADMIN — SODIUM CHLORIDE 40 MG: 9 INJECTION INTRAMUSCULAR; INTRAVENOUS; SUBCUTANEOUS at 08:48

## 2022-06-13 RX ADMIN — LIDOCAINE HYDROCHLORIDE 60 MG: 20 INJECTION, SOLUTION INFILTRATION; PERINEURAL at 13:23

## 2022-06-13 RX ADMIN — PROPOFOL 200 MG: 10 INJECTION, EMULSION INTRAVENOUS at 13:23

## 2022-06-13 RX ADMIN — TIOTROPIUM BROMIDE INHALATION SPRAY 2 PUFF: 3.12 SPRAY, METERED RESPIRATORY (INHALATION) at 07:35

## 2022-06-13 RX ADMIN — SODIUM CHLORIDE: 9 INJECTION, SOLUTION INTRAVENOUS at 12:37

## 2022-06-13 RX ADMIN — SODIUM CHLORIDE: 9 INJECTION, SOLUTION INTRAVENOUS at 13:00

## 2022-06-13 RX ADMIN — LEVOTHYROXINE SODIUM 88 MCG: 0.09 TABLET ORAL at 05:56

## 2022-06-13 RX ADMIN — SODIUM CHLORIDE: 9 INJECTION, SOLUTION INTRAVENOUS at 04:04

## 2022-06-13 RX ADMIN — SUCRALFATE 1 G: 1 TABLET ORAL at 16:07

## 2022-06-13 RX ADMIN — Medication 3 MG: at 21:27

## 2022-06-13 ASSESSMENT — ENCOUNTER SYMPTOMS
VOMITING: 0
SHORTNESS OF BREATH: 0
EYES NEGATIVE: 1
WHEEZING: 0
BLOOD IN STOOL: 0
CONSTIPATION: 1
SHORTNESS OF BREATH: 1
CONSTIPATION: 0
NAUSEA: 0
BLOOD IN STOOL: 1
CHOKING: 0
ABDOMINAL PAIN: 1
VOICE CHANGE: 0
SORE THROAT: 0
COUGH: 0
NAUSEA: 1
COLOR CHANGE: 0
ANAL BLEEDING: 0
RECTAL PAIN: 0
BACK PAIN: 0
DIARRHEA: 0
TROUBLE SWALLOWING: 0
ABDOMINAL PAIN: 0

## 2022-06-13 ASSESSMENT — PAIN SCALES - GENERAL
PAINLEVEL_OUTOF10: 0
PAINLEVEL_OUTOF10: 0

## 2022-06-13 ASSESSMENT — LIFESTYLE VARIABLES: SMOKING_STATUS: 1

## 2022-06-13 ASSESSMENT — PAIN - FUNCTIONAL ASSESSMENT: PAIN_FUNCTIONAL_ASSESSMENT: 0-10

## 2022-06-13 NOTE — PLAN OF CARE
PRE-CONSULT ROUNDING NOTE    HPI    69-year-old female presents to ED with abdominal pain, fatigue. Onset about 3 weeks ago. Patient states that she was prescribed Motrin 600 mg for back injury that occurred 1 month ago. Her hgb at that time was 15.5. Patient has been taking Motrin approximately 3 times a day for the last three weeks. Recently she has been experiencing abdominal pain, nausea. Also her stools have been dark x 1 week. Reports constipation. No fevers, chills, chest pain, hematemesis, diarrhea. No dysphagia, odynophagia, dyspeptic symptoms. No weight loss. Hgb on admission 4.4. She received 4 units of PRBCs. Hgb presently 7.4. FOBT + in ED. Has been on PPI drip  No BM overnight. No previous EGD or colonoscopy. No family hx of colon, stomach cancer. Patient reports drinking 2-3 glasses of wine daily for last several years. No prior hx of liver disease. Current smoker    PMHX includes HTN, COPD, hypothyroidism. Review of Systems   Constitutional: Positive for fatigue. Negative for appetite change, fever and unexpected weight change. HENT: Negative for mouth sores, sore throat, trouble swallowing and voice change. Eyes: Negative. Respiratory: Negative for cough, choking, shortness of breath and wheezing. Cardiovascular: Negative for chest pain, palpitations and leg swelling. Gastrointestinal: Positive for abdominal pain, blood in stool, constipation and nausea. Negative for rectal pain and vomiting. Endocrine: Negative for polyphagia and polyuria. Genitourinary: Negative for difficulty urinating, frequency, hematuria, pelvic pain, urgency and vaginal bleeding. Musculoskeletal: Negative for arthralgias, back pain, gait problem and joint swelling. Skin: Negative for color change, pallor and rash. Allergic/Immunologic: Negative for food allergies. Neurological: Positive for weakness. Negative for dizziness, seizures, light-headedness and headaches. Hematological: Negative for adenopathy. Does not bruise/bleed easily. Psychiatric/Behavioral: Negative for sleep disturbance. The patient is not nervous/anxious. Physical Exam  Vitals and nursing note reviewed. Constitutional:       Appearance: She is well-developed. HENT:      Head: Normocephalic and atraumatic. Eyes:      General: No scleral icterus. Conjunctiva/sclera: Conjunctivae normal.      Pupils: Pupils are equal, round, and reactive to light. Neck:      Thyroid: No thyromegaly. Vascular: No hepatojugular reflux or JVD. Trachea: No tracheal deviation. Cardiovascular:      Rate and Rhythm: Normal rate and regular rhythm. Heart sounds: Normal heart sounds. Pulmonary:      Effort: Pulmonary effort is normal. No respiratory distress. Breath sounds: Normal breath sounds. No wheezing or rales. Abdominal:      General: Bowel sounds are normal. There is no distension. Palpations: Abdomen is soft. There is no hepatomegaly or mass. Tenderness: There is no abdominal tenderness. There is no rebound. Hernia: No hernia is present. Musculoskeletal:         General: No tenderness. Cervical back: Normal range of motion and neck supple. Comments: No joint swelling   Lymphadenopathy:      Cervical: No cervical adenopathy. Skin:     General: Skin is warm. Findings: No bruising, ecchymosis, erythema or rash. Neurological:      Mental Status: She is alert and oriented to person, place, and time. Cranial Nerves: No cranial nerve deficit. Psychiatric:         Thought Content:  Thought content normal.       ASSESSMENT/PLAN    Anemia  FOBT +  NSAID use    -PPI  -NPO  -EGD today  -Monitor for bleeding  -Trend H&H  -Transfuse for hgb < 7  -Avoid NSAIDs

## 2022-06-13 NOTE — PLAN OF CARE
Problem: Discharge Planning  Goal: Discharge to home or other facility with appropriate resources  6/13/2022 0306 by Cherry Simpson RN  Outcome: Progressing     Problem: Pain  Goal: Verbalizes/displays adequate comfort level or baseline comfort level  6/13/2022 0306 by Cherry Simpson RN  Outcome: Progressing

## 2022-06-13 NOTE — OP NOTE
ESOPHAGOGASTRODUODENOSCOPY   ( EGD )  DATE OF PROCEDURE: 6/13/2022     SURGEON: Boom Wells MD    ASSISTANT: None    PREOPERATIVE DIAGNOSIS: Patient has significant anemia, was taking excessive NSAIDs, has of melanotic stools. Procedure for probably all right upper GI lesions    POSTOPERATIVE DIAGNOSIS: Acute ulceration involving the entire apex of the bulb towards the second part of the duodenum. Even though no active bleeding, there was some oozing of blood. Not able to identify visible vessel in this area. OPERATION: Upper GI endoscopy       ANESTHESIA: MAC    ESTIMATED BLOOD LOSS: None    COMPLICATIONS: None. SPECIMENS:  Was Not Obtained    HISTORY: The patient is a 65y.o. year old female with history of above preop diagnosis. I recommended esophagogastroduodenoscopy with possible biopsy and I explained the risk, benefits, expected outcome, and alternatives to the procedure. Risks included but are not limited to bleeding, infection, respiratory distress, hypotension, and perforation of the esophagus, stomach, or duodenum. Patient understands and is in agreement. PROCEDURE: The patient was given IV conscious sedation. The patient's SPO2 remained above 90% throughout the procedure. Cetacaine spray given. Patient placed in left lateral position. Olympus  videogastroscope was inserted orally under vision into the esophagus without difficulty and advanced into the stomach then through the pylorus up to the second part of duodenum. Findings:    Retropharyngeal area was grossly normal appearing    Esophagus: normal may have small hiatal hernia with nonobstructing Schatzki's ring. Stomach:    Fundus and Cardia Examined in Retroflexed View: normal    Body: normal.  Has coffee colored liquid with few clots noted in the body of the stomach. Antrum: normal, has a coffee colored liquid with small clots present. Duodenum:     Descending: normal.  No fresh blood. Bulb: abnormal: There is a huge ulcer starting from the mid duodenal bulb, apex of the bulb postbulbar area. Entire apex of the bulb involved with ulceration. Has some oozing of blood noted however no active bleeding seen. No visible vessel seen. However because of the extent of the huge ulcer, location, could not evaluate this area satisfactorily. While withdrawing the scope the above findings were verified and the scope was removed. The patient has tolerated the procedure without unusual events. Recommendations/Plan:   1. To treat this patient aggressively with PPI therapy. 2. We will add Carafate. 3. Surgical consultation/standby  4. Discussed with the family  To follow the patient closely. To keep NPO.                  Electronically signed by Alfredo Kennedy MD  on 6/13/2022 at 1:31 PM

## 2022-06-13 NOTE — PROGRESS NOTES
[address]    PROGRESS NOTE             6/13/2022    1:04 PM    Name:   Mike Ivan  MRN:     [id number]     Acct:      [de-identified]   Room:   [facility] Pool/NONE  IP Day:  1  Admit Date:  6/12/2022  1:35 PM    PCP:  Stacey Pinto MD  Code Status:  Full Code    Subjective:     C/C:   Chief Complaint   Patient presents with    Fatigue    Shortness of Breath     Interval History Status: improved. Patient was seen and examined at bedside. Patient is in no acute distress. She said that she is feeling better today. Denies any headache, dizziness, lightheadedness, chest pain, shortness of breath, abdominal pain, hematemesis or melena. Brief History:     The patient is a 65 y.o. Non- / non  female who with past medical history of hypertension, COPD, hypothyroidism presents with abdominal pain and fatigue that started around a month ago. Patient said that she started to take Motrin for her back pain around the beginning of May. Since then, she has been having stomach pain that started after she takes the Motrin and lasts for couple of hours. Patient said that she was taking ibuprofen 600 more than 1 time a day, alternating with Tylenol. She also reported melena, headache, dizziness, shortness of breath, fatigue. Denies any fever, chills, chest pain, hematemesis, diarrhea or constipation. [facility] from the ED. She had a negative head CT scan at that time. At ER, patient is vitally stable. Hemoglobin was found to be 4.4, WBC 21, normal platelet. Patient received 1 unit of PRBC with Protonix bolus in the ED.   bedside renal ultrasound showed hydronephrosis versus renal cysts.   Troponin 18.   and she is admitted to the hospital for the management of anemia likely secondary to upper GI bleeding       Review of Systems:     Review of Systems Constitutional: Negative for fatigue and fever. Respiratory: Negative for cough and shortness of breath. Cardiovascular: Negative for chest pain, palpitations and leg swelling. Gastrointestinal: Negative for abdominal pain, anal bleeding, blood in stool, constipation, diarrhea, nausea and vomiting. Genitourinary: Negative for dysuria, flank pain, frequency and hematuria. Neurological: Negative for dizziness, light-headedness and headaches. Psychiatric/Behavioral: Negative for agitation and confusion. Medications: Allergies:  No Known Allergies    Current Meds:   Scheduled Meds:    [MAR Hold] sodium chloride flush  5-40 mL IntraVENous 2 times per day    [MAR Hold] cefTRIAXone (ROCEPHIN) IV  1,000 mg IntraVENous Q24H    [MAR Hold] atorvastatin  20 mg Oral Daily    [MAR Hold] levothyroxine  88 mcg Oral Daily    [MAR Hold] tiotropium  2 puff Inhalation Daily    [MAR Hold] pantoprazole (PROTONIX) 40 mg injection  40 mg IntraVENous BID     Continuous Infusions:    [MAR Hold] sodium chloride      [MAR Hold] sodium chloride      [MAR Hold] sodium chloride 50 mL/hr at 06/13/22 1237     PRN Meds: [MAR Hold] sodium chloride, [MAR Hold] sodium chloride flush, [MAR Hold] sodium chloride, [MAR Hold] ondansetron **OR** [MAR Hold] ondansetron, [MAR Hold] polyethylene glycol, [MAR Hold] acetaminophen **OR** [MAR Hold] acetaminophen, [MAR Hold] albuterol, [MAR Hold] melatonin    Data:     Past Medical History:   has a past medical history of COPD (chronic obstructive pulmonary disease) ([facility] [address] Hyperlipidemia, Hypertension, Kidney stone, and Thyroid disease. Social History:   reports that she has been smoking cigarettes. She has never used smokeless tobacco. She reports current alcohol use of about 2.0 standard drinks of alcohol per week. She reports that she does not use drugs. Family History: History reviewed. No pertinent family history.     Vitals:  /70   Pulse 85   Temp 98.7 °F (37.1 °C) (Temporal)   Resp 18   Ht 5' 2\" (1.575 m)   Wt 129 lb (58.5 kg)   SpO2 96%   BMI 23.59 kg/m²   Temp (24hrs), Av.4 °F (36.9 °C), Min:97.7 °F (36.5 °C), Max:99 °F (37.2 °C)    No results for input(s): POCGLU in the last 72 hours. I/O(24Hr): Intake/Output Summary (Last 24 hours) at 2022 1304  Last data filed at 2022 0735  Gross per 24 hour   Intake 702.5 ml   Output 1100 ml   Net -397.5 ml       Labs:  [unfilled]    No results found for: SPECIAL  No results found for: CULTURE    [unfilled]    Radiology:    XR CHEST PORTABLE    Result Date: 2022  EXAMINATION: ONE XRAY VIEW OF THE CHEST 2022 2:00 pm COMPARISON: None. HISTORY: ORDERING SYSTEM PROVIDED HISTORY: shortenss of breath, hx copd TECHNOLOGIST PROVIDED HISTORY: shortenss of breath, hx copd FINDINGS: Lungs are hyperinflated suggesting COPD. No focal consolidation. No pneumothorax or pleural effusion. Cardiac and mediastinal silhouettes unremarkable. No acute osseous abnormality. Generalized osteopenia. Telemetry leads overlie the chest.     COPD. No acute findings. Physical Examination:        Physical Exam  Constitutional:       General: She is not in acute distress. Appearance: Normal appearance. HENT:      Head: Normocephalic and atraumatic. Cardiovascular:      Rate and Rhythm: Normal rate and regular rhythm. Pulses: Normal pulses. Heart sounds: Normal heart sounds. No murmur heard. Pulmonary:      Effort: Pulmonary effort is normal.      Breath sounds: Normal breath sounds. No wheezing, rhonchi or rales. Abdominal:      General: Abdomen is flat. Bowel sounds are normal. There is no distension. Palpations: Abdomen is soft. There is no mass. Tenderness: There is no abdominal tenderness. Musculoskeletal:      Right lower leg: No edema. Left lower leg: No edema. Skin:     General: Skin is warm. Findings: No bruising or rash.    Neurological:      General: No focal deficit present. Mental Status: She is alert and oriented to person, place, and time. Psychiatric:         Mood and Affect: Mood normal.           Assessment:        Primary Problem  Anemia    Active Hospital Problems    Diagnosis Date Noted    Anemia [D64.9] 06/12/2022     Priority: Medium    Leukocytosis (leucocytosis) [D72.829] 06/12/2022     Priority: Medium    Hypothyroidism [E03.9] 06/12/2022     Priority: Medium    [facility] disease[facility] [J44.9] 06/12/2022     Priority: Medium       Plan:           Acute anemia likely secondary to upper GI bleeding due to NSAID use  -Patient uses Motrin multiple times daily.    -Hemoglobin is 4.4> 7.5 (baseline is 15)  -Elevated reticulocyte count  -Patient received 2 unit of PRBC   -H&H Q8  -FOBT  -Protonix 40 mg IV twice daily  -GI consulted  -NPO since midnight        Leukocytosis-Improved  -WBC 21>12  -Normal lactate  -Negative chest x-ray  -negative urinalysis,  Normal CRP  -IV Rocephin        History of hypothyroidism   -TSH is 11.5  -Continue home Synthroid 88 mcg Daily         History of hypertension  - will hold home medications due to low blood pressure     Diet: Liquid. N.p.o. at midnight  DVT prophylaxis: Pneumatic compression  GI prophylaxis: Protonix IV     Consultations:   IP CONSULT TO INTERNAL MEDICINE  IP CONSULT TO GI  IP CONSULT TO SOCIAL WORK      Patient is admitted as inpatient status because of co-morbiditieslisted above, severity of signs and symptoms as outlined, requirement for current medical therapies and most importantly because of direct risk to patient if care not provided in a hospital setting. Michael Desouza MD  6/13/2022  1:04 PM   Attending Physician Statement    I have discussed the case of Derian Zarate, including pertinent history and exam findings with the resident. I have seen and examined the patient and the key elements of the encounter have been performed by me.  I agree with the assessment, plan, and orders as documented by the resident. It is my impression that she had NSAID induced gastropathy. She was taking frequent ibuprofen for backache, order x-ray lumbosacral spine to rule out compression fracture.   Electronically signed by Jessica Jenkins MD on 6/13/2022 at 1:04 PM

## 2022-06-13 NOTE — PROGRESS NOTES
Notified Dr. Violeta Felipe of patient's Hemoglobin of 8.1. Orders received to not give the last 2 units of PRBC and transfuse if less than 7. Orders received H&H Q6 and  for 3 mg melatonin prn at night.

## 2022-06-13 NOTE — CARE COORDINATION
CASE MANAGEMENT NOTE:    Admission Date:  6/12/2022 Jaclyn Alas is a 65 y.o.  female    Admitted for : Anemia [D64.9]  Anemia, unspecified type [D64.9]    Met with:  Patient    PCP:  Dr. Dyllan Ellis Medicare      Is patient alert and oriented at time of discussion:  Yes    Current Residence/ Living Arrangements:  independently at home alone in apartment             Current Services PTA:  No    Does patient go to outpatient dialysis: No  If yes, location and chair time: n/a    Is patient agreeable to VNS: No    Freedom of choice provided:  Yes    List of [address] provided: No    VNS chosen:  NA    DME:  none    Home Oxygen: states she has an [employer] portable O2 concentrator that she uses PRN    Nebulizer: No    CPAP/BIPAP: No    Supplier: N/A    Potential Assistance Needed: No    SNF needed: No    Freedom of choice and list provided: NA    Pharmacy:  [address] [place] [address] in [place]       Is patient currently receiving oral anticoagulation therapy? No    Is the Patient an MOOSE [facility] with Readmission Risk Score greater than 14%? No  If yes, pt needs a follow up appointment made within 7 days. Family Members/Caregivers that pt would like involved in their care:    Yes    If yes, list name here:  Malena Laird and Panacea    Transportation Provider:  Family             Discharge Plan:  Home without needs.                  Electronically signed by: Sawyer Mckeon RN on 6/13/2022 at 11:37 AM

## 2022-06-13 NOTE — ANESTHESIA PRE PROCEDURE
Department of Anesthesiology  Preprocedure Note       Name:  Ingris Canela   Age:  65 y.o.  :  1945                                          MRN:  [id number]         Date:  2022      Surgeon: Renata Chadwick):  Yvette Leigh MD    Procedure: Procedure(s):  EGD    Medications prior to admission:   Prior to Admission medications    Medication Sig Start Date End Date Taking?  Authorizing Provider   levothyroxine (SYNTHROID) 88 MCG tablet Take 88 mcg by mouth Daily   Yes Historical Provider, MD   amLODIPine (NORVASC) 5 MG tablet Take 5 mg by mouth daily   Yes Historical Provider, MD   atorvastatin (LIPITOR) 20 MG tablet Take 20 mg by mouth daily   Yes Historical Provider, MD   lisinopril (PRINIVIL;ZESTRIL) 10 MG tablet Take 10 mg by mouth daily   Yes Historical Provider, MD   tiotropium (SPIRIVA) 18 MCG inhalation capsule Inhale 18 mcg into the lungs daily   Yes Historical Provider, MD   albuterol (PROVENTIL) (2.5 MG/3ML) 0.083% nebulizer solution Take 2.5 mg by nebulization every 6 hours as needed for Wheezing   Yes Historical Provider, MD       Current medications:    Current Facility-Administered Medications   Medication Dose Route Frequency Provider Last Rate Last Admin    [MAR Hold] 0.9 % sodium chloride infusion   IntraVENous PRN Maximilian Drew DO        [MAR Hold] sodium chloride flush 0.9 % injection 5-40 mL  5-40 mL IntraVENous 2 times per day Zenaida Davies MD        [facility] Hold] sodium chloride flush 0.9 % injection 5-40 mL  5-40 mL IntraVENous PRN Zenaida Davies MD        [MAR Hold] 0.9 % sodium chloride infusion   IntraVENous PRN Zenaida Davies MD        [MAR Hold] ondansetron (ZOFRAN-ODT) disintegrating tablet 4 mg  4 mg Oral Q8H PRN Zenaida Davies MD        Or    [MAR Hold] ondansetron (ZOFRAN) injection 4 mg  4 mg IntraVENous Q6H PRN Zenaida Davies MD        [MAR Hold] polyethylene glycol (GLYCOLAX) packet 17 g  17 g Oral Daily PRN [facility] [[facility] Hold] acetaminophen (TYLENOL) tablet 650 mg  650 mg Oral Q6H PRN Zenaida Davies MD        Or   [facility] Joycein Hold] acetaminophen (TYLENOL) suppository 650 mg  650 mg Rectal Q6H PRN Zenaida Davies MD        [MAR Hold] cefTRIAXone (ROCEPHIN) 1000 mg IVPB in NS 50ml minibag  1,000 mg IntraVENous Q24H Zenaida Davies MD   Stopped at 06/12/22 2252    [MAR Hold] albuterol (PROVENTIL) nebulizer solution 2.5 mg  2.5 mg Nebulization Q6H PRN Zenaida Davies MD        [MAR Hold] atorvastatin (LIPITOR) tablet 20 mg  20 mg Oral Daily Zenaida Davies MD        [MAR Hold] levothyroxine (SYNTHROID) tablet 88 mcg  88 mcg Oral Daily Zenaida Davies MD   88 mcg at 06/13/22 0556    [MAR Hold] tiotropium (SPIRIVA RESPIMAT) 2.5 MCG/ACT inhaler 2 puff  2 puff Inhalation Daily Zenaida Davies MD   2 puff at 06/13/22 0735    [MAR Hold] 0.9 % sodium chloride infusion   IntraVENous Continuous Zenaida Davies MD 50 mL/hr at 06/13/22 1237 New Bag at 06/13/22 1237    [MAR Hold] pantoprazole (PROTONIX) 40 mg in sodium chloride (PF) 10 mL injection  40 mg IntraVENous BID Zenaida Davies MD   40 mg at 06/13/22 0848    [MAR Hold] melatonin tablet 3 mg  3 mg Oral Nightly PRN Saravanan Cardoso MD   3 mg at 06/12/22 2307       Allergies:  No Known Allergies    Problem List:    Patient Active Problem List   Diagnosis Code    Anemia D64.9    Leukocytosis (leucocytosis) D72.829    Hypothyroidism E03.9    COPD (chronic obstructive pulmonary disease) ([facility] [address] J44.9       Past Medical History:        Diagnosis Date    COPD (chronic obstructive pulmonary disease) ([facility] [address]     Hyperlipidemia     Hypertension     Kidney stone     Thyroid disease        Past Surgical History:  History reviewed. No pertinent surgical history.     Social History:    Social History     Tobacco Use    Smoking status: Current Every Day Smoker     Types: Cigarettes    Smokeless tobacco: Never Used   Substance Use Topics    Alcohol use: Yes     Alcohol/week: 2.0 standard drinks     Types: 2 Glasses of wine per week                                Ready to quit: Not Answered  Counseling given: Not Answered      Vital Signs (Current):   Vitals:    06/13/22 0015 06/13/22 0645 06/13/22 0737 06/13/22 1234   BP: (!) 116/57 (!) 112/58  136/70   Pulse: 78 74  85   Resp: 18 18  18   Temp: 98.1 °F (36.7 °C) 98.6 °F (37 °C)  98.7 °F (37.1 °C)   TempSrc: Oral Oral  Temporal   SpO2: 99% 96% 99% 96%   Weight: 129 lb 3 oz (58.6 kg)   129 lb (58.5 kg)   Height:    5' 2\" (1.575 m)                                              BP Readings from Last 3 Encounters:   06/13/22 136/70       NPO Status: Time of last liquid consumption: 2359                        Time of last solid consumption: 2359                        Date of last liquid consumption: 06/12/22                        Date of last solid food consumption: 06/12/22    BMI:   Wt Readings from Last 3 Encounters:   06/13/22 129 lb (58.5 kg)     Body mass index is 23.59 kg/m². CBC:   Lab Results   Component Value Date    WBC 12.0 06/13/2022    RBC 2.40 06/13/2022    HGB 7.5 06/13/2022    HCT 22.2 06/13/2022    MCV 92.4 06/13/2022    RDW 17.0 06/13/2022     06/13/2022       CMP:   Lab Results   Component Value Date     06/13/2022    K 3.9 06/13/2022     06/13/2022    CO2 21 06/13/2022    BUN 23 06/13/2022    CREATININE 0.73 06/13/2022    GFRAA >60 06/13/2022    LABGLOM >60 06/13/2022    GLUCOSE 98 06/13/2022    PROT 6.1 06/12/2022    CALCIUM 8.0 06/13/2022    BILITOT <0.15 06/12/2022    ALKPHOS 76 06/12/2022    AST 15 06/12/2022    ALT 21 06/12/2022       POC Tests: No results for input(s): POCGLU, POCNA, POCK, POCCL, POCBUN, POCHEMO, POCHCT in the last 72 hours.     Coags:   Lab Results   Component Value Date    PROTIME 14.2 06/12/2022    INR 1.1 06/12/2022    APTT 36.0 06/12/2022       HCG (If Applicable): No results found for: PREGTESTUR, PREGSERUM, HCG, HCGQUANT     ABGs: No results found for: PHART, PO2ART, XLK1PTQ, MYR6ITV, BEART, B9SXSUPK     Type & Screen (If Applicable):  No results found for: LABABO, LABRH    Drug/Infectious Status (If Applicable):  No results found for: HIV, HEPCAB    COVID-19 Screening (If Applicable):   Lab Results   Component Value Date    COVID19 Not Detected 06/12/2022           Anesthesia Evaluation  Patient summary reviewed and Nursing notes reviewed no history of anesthetic complications:   Airway: Mallampati: II  TM distance: >3 FB   Neck ROM: full  Mouth opening: > = 3 FB   Dental:          Pulmonary:normal exam  breath sounds clear to auscultation  (+) COPD (home O2 prn):  current smoker                           Cardiovascular:    (+) hypertension:, hyperlipidemia      ECG reviewed  Rhythm: regular  Rate: normal                    Neuro/Psych:               GI/Hepatic/Renal:            ROS comment: GI bleed. Endo/Other:    (+) hypothyroidism, blood dyscrasia: anemia:., .                 Abdominal:             Vascular: Other Findings:           Anesthesia Plan      general     ASA 3       Induction: intravenous. MIPS: Prophylactic antiemetics administered. Anesthetic plan and risks discussed with patient. Plan discussed with CRNA.                     Froylan Griffith MD   6/13/2022

## 2022-06-13 NOTE — ANESTHESIA POSTPROCEDURE EVALUATION
Department of Anesthesiology  Postprocedure Note    Patient: Felipe Kline  MRN: [id number]  YOB: 1945  Date of evaluation: 6/13/2022  Time:  3:41 PM     Procedure Summary     Date: 06/13/22 Room / Location: [place] [address] / [facility]    Anesthesia Start: 0108 Anesthesia Stop: 4123    Procedure: EGD (N/A Esophagus) Diagnosis:       Gastrointestinal hemorrhage, unspecified gastrointestinal hemorrhage type      (GI BLEED)      (IP RM 2112)    Surgeons: Antony Deleon MD Responsible Provider: Duncan Cesar MD    Anesthesia Type: general ASA Status: 3          Anesthesia Type: No value filed. Gaby Phase I:      Gaby Phase II: Gaby Score: 7    Last vitals: Reviewed and per EMR flowsheets.        Anesthesia Post Evaluation    Comments: POST- ANESTHESIA EVALUATION       Pt Name: Felipe Kline  MRN: [id number]  YOB: 1945  Date of evaluation: 6/13/2022  Time:  3:41 PM      /65   Pulse 81   Temp 98.2 °F (36.8 °C) (Oral)   Resp 20   Ht 5' 2\" (1.575 m)   Wt 129 lb (58.5 kg)   SpO2 96%   BMI 23.59 kg/m²      Consciousness Level  Awake  Cardiopulmonary Status  Stable  Pain Adequately Treated YES  Nausea / Vomiting  NO  Adequate Hydration  YES  Anesthesia Related Complications NONE      Electronically signed by Duncan Cesar MD on 6/13/2022 at 3:41 PM

## 2022-06-13 NOTE — PROGRESS NOTES
Physician Progress Note      Jennifer Kern  CSN #:                  [id number]  :                       1945  ADMIT DATE:       2022 1:35 PM  [address] [place] DATE:  RESPONDING  PROVIDER #:        Edinson Simon          QUERY TEXT:    Patient admitted with GI bleeding, underwent EGD, and noted documentation of   large duodenal ulcer in Op note on . If possible, please document in   progress notes and discharge summary the cause of the GI bleeding: The medical record reflects the following:  Risk Factors: 65 y.o. female recently treated for back injury with Motrin. Pt   reports three week history of abd pain, nausea, and dark stools. Clinical Indicators: H&P : She reported melena, headache, dizziness,   shortness of breath, fatigue and is admitted to the hospital for the   management of anemia likely secondary to upper GI bleeding. Acute anemia   likely secondary to upper GI bleeding due to NSAID use (Hgb 4.4, usually   around 15) -Patient received 1 unit of PRBC in the ED- GI consulted. GI Consult : Stools have been dark x 1 week. Hgb on admission 4.4. She   received 4 units of PRBCs. Hgb presently 7.4. FOBT + in ED. Basing on the   history, symptoms upper GI issues such as ulcer disease /gastritis suspected. EGD : Acute ulceration involving the entire apex of the bulb towards the   second part of the duodenum. Even though no active bleeding, there was some   oozing of blood. Not able to identify visible vessel in this area. There is a   huge ulcer starting from the mid duodenal bulb, apex of the bulb postbulbar   area. Entire apex of the bulb involved wit  Treatment: GI Consult, EGD, blood transfusion, 3 out of 4 units. 1 L bolus   with IVFs at 50 ml/ hr. Protonix gtt. Carafate.   Options provided:  -- GI bleeding due to duodenal ulcer  -- Other - I will add my own diagnosis  -- Disagree - Not applicable / Not valid  -- Disagree - Clinically unable to determine / Unknown  -- Refer to Clinical Documentation Reviewer    PROVIDER RESPONSE TEXT:    This patient has GI bleeding due to duodenal ulcer.     Query created by: Polo Deleon on 6/13/2022 3:34 PM      Electronically signed by:  Priscilla Mahajan 6/13/2022 3:40 PM

## 2022-06-13 NOTE — FLOWSHEET NOTE
06/13/22 1531   Encounter Summary   Encounter Overview/Reason  Attempted Encounter   Service Provided For: Patient not available  (PT was not in room)

## 2022-06-13 NOTE — ACP (ADVANCE CARE PLANNING)
Advance Care Planning     Advance Care Planning Activator (Inpatient)  Conversation Note      Date of ACP Conversation: 6/13/2022     Conversation Conducted with: Patient with Decision Making Capacity    ACP Activator: Maureen Najera RN      Health Care Decision Maker:     Current Designated Health Care Decision Maker:     Primary Decision Maker: Dudley Coronel - Child - [phone]    Primary Decision Maker: Rianna Leyva - Child - [phone]    Care Preferences    Ventilation: \"If you were in your present state of health and suddenly became very ill and were unable to breathe on your own, what would your preference be about the use of a ventilator (breathing machine) if it were available to you? \"      Would the patient desire the use of ventilator (breathing machine)?: yes    \"If your health worsens and it becomes clear that your chance of recovery is unlikely, what would your preference be about the use of a ventilator (breathing machine) if it were available to you? \"     Would the patient desire the use of ventilator (breathing machine)?: Yes      Resuscitation  \"CPR works best to restart the heart when there is a sudden event, like a heart attack, in someone who is otherwise healthy. Unfortunately, CPR does not typically restart the heart for people who have serious health conditions or who are very sick. \"    \"In the event your heart stopped as a result of an underlying serious health condition, would you want attempts to be made to restart your heart (answer \"yes\" for attempt to resuscitate) or would you prefer a natural death (answer \"no\" for do not attempt to resuscitate)? \" yes       [] Yes   [] No   Educated Patient / Sherrie Lamas regarding differences between Advance Directives and portable DNR orders.     Length of ACP Conversation in minutes:      Conversation Outcomes:  [x] ACP discussion completed  [] Existing advance directive reviewed with patient; no changes to patient's previously recorded wishes  [] New Advance Directive completed  [] Portable Do Not Rescitate prepared for Provider review and signature  [] POLST/POST/MOLST/MOST prepared for Provider review and signature      Follow-up plan:    [] Schedule follow-up conversation to continue planning  [] Referred individual to Provider for additional questions/concerns   [] Advised patient/agent/surrogate to review completed ACP document and update if needed with changes in condition, patient preferences or care setting    [] This note routed to one or more involved healthcare providers

## 2022-06-14 ENCOUNTER — APPOINTMENT (OUTPATIENT)
Dept: ULTRASOUND IMAGING | Age: 77
DRG: 378 | End: 2022-06-14
Payer: MEDICARE

## 2022-06-14 LAB
ABSOLUTE EOS #: 0.34 K/UL (ref 0–0.4)
ABSOLUTE LYMPH #: 1.68 K/UL (ref 1–4.8)
ABSOLUTE MONO #: 0.9 K/UL (ref 0.1–1.3)
ANION GAP SERPL CALCULATED.3IONS-SCNC: 11 MMOL/L (ref 9–17)
BASOPHILS # BLD: 1 % (ref 0–2)
BASOPHILS ABSOLUTE: 0.11 K/UL (ref 0–0.2)
BUN BLDV-MCNC: 14 MG/DL (ref 8–23)
CALCIUM SERPL-MCNC: 7.8 MG/DL (ref 8.6–10.4)
CHLORIDE BLD-SCNC: 108 MMOL/L (ref 98–107)
CO2: 17 MMOL/L (ref 20–31)
CREAT SERPL-MCNC: 0.62 MG/DL (ref 0.5–0.9)
EOSINOPHILS RELATIVE PERCENT: 3 % (ref 0–4)
GFR AFRICAN AMERICAN: >60 ML/MIN
GFR NON-AFRICAN AMERICAN: >60 ML/MIN
GFR SERPL CREATININE-BSD FRML MDRD: ABNORMAL ML/MIN/{1.73_M2}
GLUCOSE BLD-MCNC: 73 MG/DL (ref 70–99)
HCT VFR BLD CALC: 20 % (ref 36–46)
HCT VFR BLD CALC: 21.1 % (ref 36–46)
HCT VFR BLD CALC: 23.2 % (ref 36–46)
HEMOGLOBIN: 6.9 G/DL (ref 12–16)
HEMOGLOBIN: 7.1 G/DL (ref 12–16)
HEMOGLOBIN: 7.3 G/DL (ref 12–16)
LYMPHOCYTES # BLD: 15 % (ref 24–44)
MAGNESIUM: 2 MG/DL (ref 1.6–2.6)
MCH RBC QN AUTO: 31.4 PG (ref 26–34)
MCHC RBC AUTO-ENTMCNC: 31.4 G/DL (ref 31–37)
MCV RBC AUTO: 100.2 FL (ref 80–100)
MONOCYTES # BLD: 8 % (ref 1–7)
MORPHOLOGY: ABNORMAL
PATHOLOGIST REVIEW: NORMAL
PDW BLD-RTO: 18.6 % (ref 11.5–14.9)
PLATELET # BLD: 305 K/UL (ref 150–450)
PMV BLD AUTO: 7 FL (ref 6–12)
POTASSIUM SERPL-SCNC: 3.4 MMOL/L (ref 3.7–5.3)
RBC # BLD: 2.31 M/UL (ref 4–5.2)
SEG NEUTROPHILS: 73 % (ref 36–66)
SEGMENTED NEUTROPHILS ABSOLUTE COUNT: 8.17 K/UL (ref 1.3–9.1)
SODIUM BLD-SCNC: 136 MMOL/L (ref 135–144)
WBC # BLD: 11.2 K/UL (ref 3.5–11)

## 2022-06-14 PROCEDURE — 6360000002 HC RX W HCPCS: Performed by: STUDENT IN AN ORGANIZED HEALTH CARE EDUCATION/TRAINING PROGRAM

## 2022-06-14 PROCEDURE — 99233 SBSQ HOSP IP/OBS HIGH 50: CPT | Performed by: INTERNAL MEDICINE

## 2022-06-14 PROCEDURE — 6370000000 HC RX 637 (ALT 250 FOR IP): Performed by: INTERNAL MEDICINE

## 2022-06-14 PROCEDURE — 85018 HEMOGLOBIN: CPT

## 2022-06-14 PROCEDURE — 36415 COLL VENOUS BLD VENIPUNCTURE: CPT

## 2022-06-14 PROCEDURE — 2580000003 HC RX 258: Performed by: INTERNAL MEDICINE

## 2022-06-14 PROCEDURE — 80048 BASIC METABOLIC PNL TOTAL CA: CPT

## 2022-06-14 PROCEDURE — C9113 INJ PANTOPRAZOLE SODIUM, VIA: HCPCS | Performed by: INTERNAL MEDICINE

## 2022-06-14 PROCEDURE — 94760 N-INVAS EAR/PLS OXIMETRY 1: CPT

## 2022-06-14 PROCEDURE — 76770 US EXAM ABDO BACK WALL COMP: CPT

## 2022-06-14 PROCEDURE — APPSS30 APP SPLIT SHARED TIME 16-30 MINUTES: Performed by: NURSE PRACTITIONER

## 2022-06-14 PROCEDURE — 83735 ASSAY OF MAGNESIUM: CPT

## 2022-06-14 PROCEDURE — 85014 HEMATOCRIT: CPT

## 2022-06-14 PROCEDURE — 6360000002 HC RX W HCPCS: Performed by: INTERNAL MEDICINE

## 2022-06-14 PROCEDURE — 6360000002 HC RX W HCPCS

## 2022-06-14 PROCEDURE — 6370000000 HC RX 637 (ALT 250 FOR IP): Performed by: NURSE PRACTITIONER

## 2022-06-14 PROCEDURE — 94640 AIRWAY INHALATION TREATMENT: CPT

## 2022-06-14 PROCEDURE — 97166 OT EVAL MOD COMPLEX 45 MIN: CPT

## 2022-06-14 PROCEDURE — 2700000000 HC OXYGEN THERAPY PER DAY

## 2022-06-14 PROCEDURE — 2060000000 HC ICU INTERMEDIATE R&B

## 2022-06-14 PROCEDURE — 85025 COMPLETE CBC W/AUTO DIFF WBC: CPT

## 2022-06-14 PROCEDURE — 2580000003 HC RX 258: Performed by: STUDENT IN AN ORGANIZED HEALTH CARE EDUCATION/TRAINING PROGRAM

## 2022-06-14 PROCEDURE — 97530 THERAPEUTIC ACTIVITIES: CPT

## 2022-06-14 RX ORDER — POTASSIUM CHLORIDE 7.45 MG/ML
10 INJECTION INTRAVENOUS
Status: DISPENSED | OUTPATIENT
Start: 2022-06-14 | End: 2022-06-14

## 2022-06-14 RX ORDER — NICOTINE 21 MG/24HR
1 PATCH, TRANSDERMAL 24 HOURS TRANSDERMAL DAILY
Status: DISCONTINUED | OUTPATIENT
Start: 2022-06-14 | End: 2022-06-18 | Stop reason: HOSPADM

## 2022-06-14 RX ORDER — SODIUM CHLORIDE 9 MG/ML
INJECTION, SOLUTION INTRAVENOUS PRN
Status: DISCONTINUED | OUTPATIENT
Start: 2022-06-14 | End: 2022-06-16

## 2022-06-14 RX ORDER — POTASSIUM CHLORIDE 20 MEQ/1
20 TABLET, EXTENDED RELEASE ORAL ONCE
Status: COMPLETED | OUTPATIENT
Start: 2022-06-14 | End: 2022-06-14

## 2022-06-14 RX ADMIN — ALBUTEROL SULFATE 2.5 MG: 2.5 SOLUTION RESPIRATORY (INHALATION) at 18:09

## 2022-06-14 RX ADMIN — TIOTROPIUM BROMIDE INHALATION SPRAY 2 PUFF: 3.12 SPRAY, METERED RESPIRATORY (INHALATION) at 08:11

## 2022-06-14 RX ADMIN — SUCRALFATE 1 G: 1 TABLET ORAL at 23:28

## 2022-06-14 RX ADMIN — LEVOTHYROXINE SODIUM 88 MCG: 0.09 TABLET ORAL at 05:36

## 2022-06-14 RX ADMIN — PANTOPRAZOLE SODIUM 8 MG/HR: 40 INJECTION, POWDER, FOR SOLUTION INTRAVENOUS at 11:43

## 2022-06-14 RX ADMIN — SUCRALFATE 1 G: 1 TABLET ORAL at 05:36

## 2022-06-14 RX ADMIN — POTASSIUM CHLORIDE 10 MEQ: 7.46 INJECTION, SOLUTION INTRAVENOUS at 10:15

## 2022-06-14 RX ADMIN — POTASSIUM CHLORIDE 20 MEQ: 20 TABLET, EXTENDED RELEASE ORAL at 14:20

## 2022-06-14 RX ADMIN — Medication 3 MG: at 21:03

## 2022-06-14 RX ADMIN — CEFTRIAXONE SODIUM 1000 MG: 1 INJECTION, POWDER, FOR SOLUTION INTRAMUSCULAR; INTRAVENOUS at 18:06

## 2022-06-14 RX ADMIN — IRON SUCROSE 200 MG: 20 INJECTION, SOLUTION INTRAVENOUS at 12:23

## 2022-06-14 RX ADMIN — SUCRALFATE 1 G: 1 TABLET ORAL at 17:55

## 2022-06-14 RX ADMIN — PANTOPRAZOLE SODIUM 8 MG/HR: 40 INJECTION, POWDER, FOR SOLUTION INTRAVENOUS at 21:03

## 2022-06-14 RX ADMIN — SUCRALFATE 1 G: 1 TABLET ORAL at 00:01

## 2022-06-14 RX ADMIN — PANTOPRAZOLE SODIUM 8 MG/HR: 40 INJECTION, POWDER, FOR SOLUTION INTRAVENOUS at 01:07

## 2022-06-14 RX ADMIN — SODIUM CHLORIDE: 9 INJECTION, SOLUTION INTRAVENOUS at 05:37

## 2022-06-14 RX ADMIN — POTASSIUM CHLORIDE 10 MEQ: 7.46 INJECTION, SOLUTION INTRAVENOUS at 08:11

## 2022-06-14 RX ADMIN — ATORVASTATIN CALCIUM 20 MG: 20 TABLET, FILM COATED ORAL at 07:23

## 2022-06-14 RX ADMIN — SUCRALFATE 1 G: 1 TABLET ORAL at 12:27

## 2022-06-14 ASSESSMENT — ENCOUNTER SYMPTOMS
NAUSEA: 0
BLOOD IN STOOL: 0
ABDOMINAL PAIN: 0
VOMITING: 0
ABDOMINAL DISTENTION: 0
COUGH: 0
CONSTIPATION: 0
SHORTNESS OF BREATH: 0
DIARRHEA: 0

## 2022-06-14 ASSESSMENT — PAIN SCALES - GENERAL: PAINLEVEL_OUTOF10: 0

## 2022-06-14 NOTE — PLAN OF CARE
Problem: Pain  Goal: Verbalizes/displays adequate comfort level or baseline comfort level  6/14/2022 0344 by Timothy Rocha RN  Outcome: Progressing  Note: Pt able to verbalize comfort     Problem: Safety - Adult  Goal: Free from fall injury  6/14/2022 0344 by Timothy Rocha RN  Outcome: Progressing  Note: Pt free from falls     Problem: ABCDS Injury Assessment  Goal: Absence of physical injury  6/14/2022 0344 by Timothy Rocha RN  Outcome: Progressing  Note: Pt absent of any physical injury

## 2022-06-14 NOTE — FLOWSHEET NOTE
Patient talked about her medical issues and her concerns about her medical needs on discharge; writer encouraged patient to discuss her discharge concerns with her medical team, patient expressed understanding; listening presence and support; declined prayer     06/14/22 1812   Encounter Summary   Encounter Overview/Reason  Spiritual/Emotional Needs   Service Provided For: Patient   Referral/Consult From: Natalya   Last Encounter  06/14/22   Complexity of Encounter Moderate   Spiritual/Emotional needs   Type Spiritual Support   Grief, Loss, and Adjustments   Type Adjustment to illness   Assessment/Intervention/Outcome   Assessment Anxious; Coping; Hopeful;Powerlessness   Intervention Active listening;Discussed illness injury and its impact; Explored/Affirmed feelings, thoughts, concerns;Prayer (assurance of)/[place];Sustaining Presence/Ministry of presence   Outcome Comfort;Coping;Engaged in conversation;Expressed feelings, needs, and concerns;Expressed Gratitude;Receptive

## 2022-06-14 NOTE — PROGRESS NOTES
[address]    PROGRESS NOTE             6/14/2022    3:07 PM    Name:   Fernando Mirza  MRN:     [id number]     Acct:      [de-identified]   Room:   2112/2112-01  IP Day:  2  Admit Date:  6/12/2022  1:35 PM    PCP:  Jose Enrique Salinas MD  Code Status:  Full Code    Subjective:     C/C:   Chief Complaint   Patient presents with    Fatigue    Shortness of Breath     Interval History Status: improved. Patient was seen and examined at bedside. She is in no acute distress. She is feeling better today. Denies any abdominal pain, hematemesis, melena, bleeding per rectum, chest pain or shortness of breath. Brief History:     The patient is a 65 y.o.  Non- / non  female who with past medical history of hypertension, COPD, hypothyroidism presents with abdominal pain and fatigue that started around a month ago. Oliver Linder said that she started to take Motrin for her back pain around the beginning of May.  Since then, she has been having stomach pain that started after she takes the Motrin and lasts for couple of hours.  Patient said that she was taking ibuprofen 600 more than 1 time a day, alternating with Tylenol.  She also reported melena, headache, dizziness, shortness of breath, fatigue.  Denies any fever, chills, chest pain, hematemesis, diarrhea or constipation. Patient said that she went to [facility] 3 days ago and was discharged from the ED. Vijay Albert had a negative head CT scan at that time. At ER, patient is vitally stable.  Hemoglobin was found to be 4.4, WBC 21, normal platelet.  Patient received 1 unit of PRBC with Protonix bolus in the ED.   bedside renal ultrasound showed hydronephrosis versus renal cysts.  Troponin 18.   and she is admitted to the hospital for the management of anemia likely secondary to upper GI bleeding       Review of Systems:     Review of Systems   Constitutional: Negative for fatigue and fever. Respiratory: Negative for cough and shortness of breath. Cardiovascular: Negative for chest pain, palpitations and leg swelling. Gastrointestinal: Negative for abdominal distention, abdominal pain, blood in stool, constipation, diarrhea, nausea and vomiting. Genitourinary: Negative for dysuria, flank pain, frequency and hematuria. Neurological: Negative for dizziness, weakness, numbness and headaches. Psychiatric/Behavioral: Negative for agitation and confusion. Medications: Allergies:  No Known Allergies    Current Meds:   Scheduled Meds:    iron sucrose  200 mg IntraVENous Q24H    sucralfate  1 g Oral 4 times per day    sodium chloride flush  5-40 mL IntraVENous 2 times per day    cefTRIAXone (ROCEPHIN) IV  1,000 mg IntraVENous Q24H    atorvastatin  20 mg Oral Daily    levothyroxine  88 mcg Oral Daily    tiotropium  2 puff Inhalation Daily     Continuous Infusions:    pantoprazole 8 mg/hr (22 1143)    sodium chloride      sodium chloride 50 mL/hr at 22 0537     PRN Meds: sodium chloride flush, sodium chloride, ondansetron **OR** ondansetron, polyethylene glycol, acetaminophen **OR** acetaminophen, albuterol, melatonin    Data:     Past Medical History:   has a past medical history of COPD (chronic obstructive pulmonary disease) ([facility] [address] Hyperlipidemia, Hypertension, Kidney stone, and Thyroid disease. Social History:   reports that she has been smoking cigarettes. She has never used smokeless tobacco. She reports current alcohol use of about 2.0 standard drinks of alcohol per week. She reports that she does not use drugs. Family History: History reviewed. No pertinent family history.     Vitals:  BP (!) 120/58   Pulse 85   Temp 98.1 °F (36.7 °C) (Oral)   Resp 18   Ht 5' 2\" (1.575 m)   Wt 129 lb (58.5 kg)   SpO2 100%   BMI 23.59 kg/m²   Temp (24hrs), Av.2 °F (36.8 °C), Min:98.1 °F (36.7 °C), Max:98.5 °F (36.9 °C)    No results for input(s): POCGLU in the last 72 hours. I/O(24Hr): Intake/Output Summary (Last 24 hours) at 6/14/2022 1507  Last data filed at 6/14/2022 1300  Gross per 24 hour   Intake 400 ml   Output --   Net 400 ml       Labs:  [unfilled]    No results found for: SPECIAL  No results found for: CULTURE    [unfilled]    Radiology:    XR LUMBAR SPINE (MIN 4 VIEWS)    Result Date: 6/13/2022  EXAMINATION: 5 XRAY VIEWS OF THE LUMBAR SPINE 6/13/2022 6:50 pm COMPARISON: None. HISTORY: ORDERING SYSTEM PROVIDED HISTORY: backache TECHNOLOGIST PROVIDED HISTORY: backache Reason for Exam: Backache FINDINGS: The bones are mildly demineralized. No bony neural foraminal narrowing. There is prominent grade 1 anterolisthesis of L5 on S1. Moderate multilevel spondylotic changes. Mild left convex lumbar curvature. Mild superior endplate concavity at G68, T12 and L1, favored to be chronic. The sacroiliac joints appear intact. Calcified atherosclerotic plaque in the abdominal aorta. 1. Prominent grade 1 anterolisthesis of L5 on S1. 2. Mild superior endplate concavity from T11-L1, favored to be chronic. If there is clinical concern for acute compression fracture, suggest correlation with CT or MRI of the lumbar spine. 3. Moderate lumbosacral spondylotic change. XR CHEST PORTABLE    Result Date: 6/12/2022  EXAMINATION: ONE XRAY VIEW OF THE CHEST 6/12/2022 2:00 pm COMPARISON: None. HISTORY: ORDERING SYSTEM PROVIDED HISTORY: shortenss of breath, hx copd TECHNOLOGIST PROVIDED HISTORY: shortenss of breath, hx copd FINDINGS: Lungs are hyperinflated suggesting COPD. No focal consolidation. No pneumothorax or pleural effusion. Cardiac and mediastinal silhouettes unremarkable. No acute osseous abnormality. Generalized osteopenia. Telemetry leads overlie the chest.     COPD. No acute findings. Physical Examination:        Physical Exam  Constitutional:       General: She is not in acute distress. Appearance: Normal appearance.    HENT:      Head: Normocephalic and atraumatic. Cardiovascular:      Rate and Rhythm: Normal rate and regular rhythm. Pulses: Normal pulses. Heart sounds: Normal heart sounds. No murmur heard. Pulmonary:      Effort: Pulmonary effort is normal.      Breath sounds: Normal breath sounds. No wheezing, rhonchi or rales. Abdominal:      General: Abdomen is flat. Bowel sounds are normal. There is no distension. Palpations: Abdomen is soft. Tenderness: There is no abdominal tenderness. Musculoskeletal:      Right lower leg: No edema. Left lower leg: No edema. Skin:     General: Skin is warm. Neurological:      General: No focal deficit present. Mental Status: She is alert and oriented to person, place, and time. Psychiatric:         Mood and Affect: Mood normal.           Assessment:        Primary Problem  Anemia    Active Hospital Problems    Diagnosis Date Noted    Upper GI bleeding [K92.2] 06/13/2022     Priority: Medium    Duodenal ulcer [K26.9] 06/13/2022     Priority: Medium    Anemia [D64.9] 06/12/2022     Priority: Medium    Leukocytosis (leucocytosis) [D72.829] 06/12/2022     Priority: Medium    Hypothyroidism [E03.9] 06/12/2022     Priority: Medium    COPD (chronic obstructive pulmonary disease) ([facility] [J44.9] 06/12/2022     Priority: Medium       Plan:        Acute anemia  secondary to bleeding duodenal ulcer due to NSAID use  -Hemoglobin is 4.4> 7.5>7.3  -Elevated reticulocyte count  -Patient received 2 unit of PRBC   -H&H Q8  -FOBT is positive   -GI consulted  -Endoscopy was done yesterday and showed huge duodenal ulcer  -NPO   --Protonix gtt.   -Avoid NSAIDs        Leukocytosis-Improved  -WBC 21>12>11.2  -Normal lactate  -Negative chest x-ray  -negative urinalysis,  Normal CRP  -IV Rocephin         History of hypothyroidism   -TSH is 11.5  -Continue home Synthroid 88 mcg Daily         History of hypertension  - will hold home medications due to low blood pressure     Diet: NPO  DVT prophylaxis: Pneumatic compression  GI prophylaxis: Protonix gtt     Consultations:   IP CONSULT TO INTERNAL MEDICINE  IP CONSULT TO GI  IP CONSULT TO SOCIAL WORK    Susana Solis MD  6/14/2022  3:07 PM   Attending Physician Statement    I have discussed the case of Murray Crawford, including pertinent history and exam findings with the resident. I have seen and examined the patient and the key elements of the encounter have been performed by me. I agree with the assessment, plan, and orders as documented by the resident. She did have a large duodenal ulcer secondary to NSAID use . There is no evidence of active bleeding. She will be given infusion of iron and will repeat CBC tomorrow. In the meantime she was found to have significant osteoarthritis in her lumbosacral spine will consult spine surgeon to determine the age of compression fracture. The patient's clinical condition and treatment plan was fully explained to her and her daughter.   It is to be noted that she does have advanced COPD and is a smoker who quit 4 weeks ago  lectronically signed by Susana Solis MD on 6/14/2022 at 3:07 PM

## 2022-06-14 NOTE — CARE COORDINATION
ONGOING DISCHARGE PLAN:    Patient is alert and oriented x4. Spoke with patient regarding discharge plan and patient confirms that plan is still home without any needs, plans to stay with daughter on discharge. POD#1 EGD - Hgb 7.3, on PPI gtt. - clear liquid diet. US renal pending    New consult for ortho d/t compression fx. And back pain    Will continue to follow for additional discharge needs.     Electronically signed by Murali Sommer RN on 6/14/2022 at 12:23 PM

## 2022-06-14 NOTE — PROGRESS NOTES
Occupational Therapy  Facility/Department: [address] [place] [address]  Occupational Therapy Initial Assessment    Name: Wilmar Granados  : 1945  MRN: [id number]  Date of Service: 2022    Discharge Recommendations:  Patient would benefit from continued therapy after discharge          Patient Diagnosis(es): The encounter diagnosis was Anemia, unspecified type. Past Medical History:  has a past medical history of COPD (chronic obstructive pulmonary disease) ([address] Hyperlipidemia, Hypertension, Kidney stone, and Thyroid disease. Past Surgical History:  has a past surgical history that includes Upper gastrointestinal endoscopy (N/A, 2022). Treatment Diagnosis: Impaired self-care status      Assessment   Performance deficits / Impairments: Decreased functional mobility ; Decreased ADL status; Decreased strength;Decreased endurance;Decreased sensation;Decreased balance;Decreased high-level IADLs;Decreased coordination  Treatment Diagnosis: Impaired self-care status  Prognosis: Good  Decision Making: Medium Complexity  Activity Tolerance  Activity Tolerance: Patient limited by fatigue;Patient Tolerated treatment well  Safety Devices  Safety Devices in place: Yes  Type of devices: All fall risk precautions in place; Bed alarm in place;Call light within reach;Gait belt;Patient at risk for falls; Left in bed;Nurse notified        Plan   Plan  Times per Week: 4-5  Current Treatment Recommendations: Strengthening,Functional mobility training,Balance training,Endurance training,Safety education & training,Patient/Caregiver education & training,Equipment evaluation, education, & procurement,Self-Care / ADL,Home management training,Positioning     Restrictions  Restrictions/Precautions  Restrictions/Precautions: Modified Diet,General Precautions,Up as Tolerated (Clear liquids)  Required Braces or Orthoses?: No    Subjective   General  Chart Reviewed: Yes,Orders,Progress Notes,History and Physical,Imaging,Labs  Patient assessed for rehabilitation services?: Yes  Additional Pertinent Hx: 70-year-old female presents to ED with abdominal pain, fatigue.  Onset about 3 weeks ago.  Patient states that she was prescribed Motrin 600 mg for back injury that occurred 1 month ago.  Her hgb at that time was 15.5. Patient has been taking Motrin approximately 3 times a day for the last three weeks. Recently she has been experiencing abdominal pain, nausea.  Also her stools have been dark x 1 week.  Reports constipation.  No fevers, chills, chest pain, hematemesis, diarrhea.  No dysphagia, odynophagia, dyspeptic symptoms.  No weight loss. Family / Caregiver Present: Yes (Daughter)  Referring Practitioner: Bridger Barrera MD  Diagnosis: Anemia, unspecified type  Subjective  Subjective: \"I just want to get better and get out of here\"  General Comment  Comments: Okay for OT jessica per ROMELIA Cristina     Social/Functional History  Social/Functional History  Lives With: Daughter  Type of Home: House  Home Layout: Two level,Bed/Bath upstairs  Home Access: Level entry,Stairs to enter without rails  Entrance Stairs - Number of Steps: 1  Entrance Stairs - Rails: None  Bathroom Shower/Tub: Tub/Shower unit,Doors,Walk-in shower  Bathroom Toilet: Handicap height  Bathroom Equipment: Grab bars in shower,Hand-held shower  Bathroom Accessibility: Accessible  Home Equipment: Oxygen  Has the patient had two or more falls in the past year or any fall with injury in the past year?: No  Receives Help From: Family  ADL Assistance: [facility] [address]: Independent  Homemaking Responsibilities: No  Ambulation Assistance: Independent  Transfer Assistance: Independent  Active : No  Patient's  Info: Daughter  Mode of Transportation: Car  Occupation: Retired  IADL Comments: Sleeps on normal flat bed  Additional Comments: Pt normally lives alone, but will be leaving the hospital and going to stay with her daughter.  Daughter reports pt will be moving into one story house nearby her next month. Daughter will be able to assist as needed. Objective   Heart Rate: 74  Heart Rate Source: Monitor  BP: (!) 114/54  BP Location: Left upper arm  BP Method: Automatic  MAP (Calculated): 74  Resp: 18  SpO2: 99 %  O2 Device: Nasal cannula (4 L)  Vision: Impaired  Vision Exceptions: Wears glasses for reading  Hearing: Within functional limits    Orientation  Overall Orientation Status: Within Functional Limits              AROM: Within functional limits  Strength: Within functional limits  Coordination: Within functional limits  Tone: Normal  Sensation: Impaired (Occasional numbness L hand)  ADL  Feeding: Independent  Feeding Skilled Clinical Factors: Per pt report  Grooming: Stand by assistance  UE Bathing: Stand by assistance  LE Bathing: Contact guard assistance  UE Dressing: Stand by assistance  LE Dressing: Contact guard assistance  Toileting: Contact guard assistance  Additional Comments: ADL scores based on skilled observation and clinical reasoning unless otherwise noted. Pt limited by decreased fatigue, endurance and activity tolerance, impacting her ability to safely and independently complete self-care.    Tone RUE  RUE Tone: Normotonic  Tone LUE  LUE Tone: Normotonic  Coordination  Movements Are Fluid And Coordinated: No  Coordination and Movement Description: Decreased speed;Right UE;Left UE  Activity Tolerance  Activity Tolerance: Patient tolerated treatment well;Patient limited by fatigue;Patient limited by endurance  Bed mobility  Supine to Sit: Stand by assistance  Sit to Supine: Stand by assistance  Scooting: Stand by assistance  Bed Mobility Comments: HOB lowered, use of handrails  Transfers  Sit to stand: Contact guard assistance  Stand to sit: Contact guard assistance (CGA progressing to SBA)  Transfer Comments: Good pacing and safety        Perception  Overall Perceptual Status: WFL     Sensation  Overall Sensation Status: Impaired (Occasional numbness L hand)            LUE AROM (degrees)  LUE AROM : WFL  Left Hand AROM (degrees)  Left Hand AROM: WFL  RUE AROM (degrees)  RUE AROM : WFL  Right Hand AROM (degrees)  Right Hand AROM: WFL  LUE Strength  Gross LUE Strength: WFL  L Hand General: 4/5  LUE Strength Comment: Grossly 4/5  RUE Strength  Gross RUE Strength: WFL  R Hand General: 4/5  RUE Strength Comment: Grossly 4/5    AM-PAC Score        [facility] Inpatient Daily Activity Raw Score: 19 (06/14/22 1302)  AM-PAC Inpatient ADL T-Scale Score : 40.22 (06/14/22 1302)  ADL Inpatient CMS 0-100% Score: 42.8 (06/14/22 1302)  ADL Inpatient CMS G-Code Modifier : CK (06/14/22 1302)    Goals  Short Term Goals  Time Frame for Short term goals: By discharge  Short Term Goal 1: Pt will perform UB ADLs with Mod I and good safety  Short Term Goal 2: Pt will perform LB ADLs with supervision, good safety, and use of AE as needed  Short Term Goal 3: Pt will perform functional transfers/mobility with supervision, good safety and use of least restrictive device  Short Term Goal 4: Pt will verbalize/demonstrate good understanding of fall prevention/home safety techniques to improve safety and independence with self-care and mobility  Short Term Goal 5: Pt will tolerate standing 5+ minutes during functional activity of choice to facilitate improved engagement with ADLs  Patient Goals   Patient goals : \"I want to get my strength up\"       Therapy Time   Individual Concurrent Group Co-treatment   Time In 1007         Time Out 1040         Minutes 33         Timed Code Treatment Minutes: 15 Minutes       Loyd Townsend, OTR/L

## 2022-06-14 NOTE — PLAN OF CARE
Problem: Discharge Planning  Goal: Discharge to home or other facility with appropriate resources  6/14/2022 1249 by Tye Orlando, RN  Outcome: Progressing     Problem: Pain  Goal: Verbalizes/displays adequate comfort level or baseline comfort level  6/14/2022 1249 by Tye Orlando, RN  Outcome: Progressing     Problem: Safety - Adult  Goal: Free from fall injury  6/14/2022 1249 by Tye Orlando, RN  Outcome: Progressing     Problem: ABCDS Injury Assessment  Goal: Absence of physical injury  6/14/2022 1249 by Tye Orlando, RN  Outcome: Progressing

## 2022-06-15 PROBLEM — M54.50 ACUTE MIDLINE LOW BACK PAIN WITHOUT SCIATICA: Status: ACTIVE | Noted: 2022-06-15

## 2022-06-15 LAB
ABSOLUTE EOS #: 0.25 K/UL (ref 0–0.4)
ABSOLUTE LYMPH #: 1.39 K/UL (ref 1–4.8)
ABSOLUTE MONO #: 1.01 K/UL (ref 0.1–1.3)
ANION GAP SERPL CALCULATED.3IONS-SCNC: 6 MMOL/L (ref 9–17)
BASOPHILS # BLD: 1 % (ref 0–2)
BASOPHILS ABSOLUTE: 0.13 K/UL (ref 0–0.2)
BUN BLDV-MCNC: 20 MG/DL (ref 8–23)
CALCIUM SERPL-MCNC: 7.4 MG/DL (ref 8.6–10.4)
CHLORIDE BLD-SCNC: 107 MMOL/L (ref 98–107)
CO2: 20 MMOL/L (ref 20–31)
CREAT SERPL-MCNC: 0.54 MG/DL (ref 0.5–0.9)
EOSINOPHILS RELATIVE PERCENT: 2 % (ref 0–4)
GFR AFRICAN AMERICAN: >60 ML/MIN
GFR NON-AFRICAN AMERICAN: >60 ML/MIN
GFR SERPL CREATININE-BSD FRML MDRD: ABNORMAL ML/MIN/{1.73_M2}
GLUCOSE BLD-MCNC: 109 MG/DL (ref 70–99)
HCT VFR BLD CALC: 21.2 % (ref 36–46)
HCT VFR BLD CALC: 26.7 % (ref 36–46)
HCT VFR BLD CALC: 28.1 % (ref 36–46)
HEMOGLOBIN: 6.9 G/DL (ref 12–16)
HEMOGLOBIN: 8.9 G/DL (ref 12–16)
HEMOGLOBIN: 9.2 G/DL (ref 12–16)
LYMPHOCYTES # BLD: 11 % (ref 24–44)
MCH RBC QN AUTO: 31.2 PG (ref 26–34)
MCHC RBC AUTO-ENTMCNC: 32.7 G/DL (ref 31–37)
MCV RBC AUTO: 95.5 FL (ref 80–100)
MONOCYTES # BLD: 8 % (ref 1–7)
MORPHOLOGY: ABNORMAL
PDW BLD-RTO: 16.9 % (ref 11.5–14.9)
PLATELET # BLD: 265 K/UL (ref 150–450)
PMV BLD AUTO: 7.3 FL (ref 6–12)
POTASSIUM SERPL-SCNC: 4.1 MMOL/L (ref 3.7–5.3)
RBC # BLD: 2.22 M/UL (ref 4–5.2)
SEG NEUTROPHILS: 78 % (ref 36–66)
SEGMENTED NEUTROPHILS ABSOLUTE COUNT: 9.82 K/UL (ref 1.3–9.1)
SODIUM BLD-SCNC: 133 MMOL/L (ref 135–144)
WBC # BLD: 12.6 K/UL (ref 3.5–11)

## 2022-06-15 PROCEDURE — 94664 DEMO&/EVAL PT USE INHALER: CPT

## 2022-06-15 PROCEDURE — 36415 COLL VENOUS BLD VENIPUNCTURE: CPT

## 2022-06-15 PROCEDURE — 99232 SBSQ HOSP IP/OBS MODERATE 35: CPT | Performed by: INTERNAL MEDICINE

## 2022-06-15 PROCEDURE — 6360000002 HC RX W HCPCS: Performed by: INTERNAL MEDICINE

## 2022-06-15 PROCEDURE — 36430 TRANSFUSION BLD/BLD COMPNT: CPT

## 2022-06-15 PROCEDURE — 85014 HEMATOCRIT: CPT

## 2022-06-15 PROCEDURE — 94761 N-INVAS EAR/PLS OXIMETRY MLT: CPT

## 2022-06-15 PROCEDURE — P9016 RBC LEUKOCYTES REDUCED: HCPCS

## 2022-06-15 PROCEDURE — 85018 HEMOGLOBIN: CPT

## 2022-06-15 PROCEDURE — 99221 1ST HOSP IP/OBS SF/LOW 40: CPT | Performed by: ORTHOPAEDIC SURGERY

## 2022-06-15 PROCEDURE — 6370000000 HC RX 637 (ALT 250 FOR IP): Performed by: INTERNAL MEDICINE

## 2022-06-15 PROCEDURE — 6360000002 HC RX W HCPCS: Performed by: STUDENT IN AN ORGANIZED HEALTH CARE EDUCATION/TRAINING PROGRAM

## 2022-06-15 PROCEDURE — 2580000003 HC RX 258: Performed by: INTERNAL MEDICINE

## 2022-06-15 PROCEDURE — 2580000003 HC RX 258: Performed by: STUDENT IN AN ORGANIZED HEALTH CARE EDUCATION/TRAINING PROGRAM

## 2022-06-15 PROCEDURE — 6370000000 HC RX 637 (ALT 250 FOR IP): Performed by: NURSE PRACTITIONER

## 2022-06-15 PROCEDURE — C9113 INJ PANTOPRAZOLE SODIUM, VIA: HCPCS | Performed by: INTERNAL MEDICINE

## 2022-06-15 PROCEDURE — 2700000000 HC OXYGEN THERAPY PER DAY

## 2022-06-15 PROCEDURE — 85025 COMPLETE CBC W/AUTO DIFF WBC: CPT

## 2022-06-15 PROCEDURE — 2060000000 HC ICU INTERMEDIATE R&B

## 2022-06-15 PROCEDURE — 80048 BASIC METABOLIC PNL TOTAL CA: CPT

## 2022-06-15 PROCEDURE — 86900 BLOOD TYPING SEROLOGIC ABO: CPT

## 2022-06-15 PROCEDURE — 94640 AIRWAY INHALATION TREATMENT: CPT

## 2022-06-15 RX ORDER — SODIUM CHLORIDE 9 MG/ML
INJECTION, SOLUTION INTRAVENOUS PRN
Status: DISCONTINUED | OUTPATIENT
Start: 2022-06-15 | End: 2022-06-16

## 2022-06-15 RX ORDER — ALBUTEROL SULFATE 2.5 MG/3ML
2.5 SOLUTION RESPIRATORY (INHALATION) 4 TIMES DAILY
Status: DISCONTINUED | OUTPATIENT
Start: 2022-06-15 | End: 2022-06-18 | Stop reason: HOSPADM

## 2022-06-15 RX ORDER — LORAZEPAM 2 MG/ML
0.5 INJECTION INTRAMUSCULAR EVERY 6 HOURS PRN
Status: DISCONTINUED | OUTPATIENT
Start: 2022-06-15 | End: 2022-06-18 | Stop reason: HOSPADM

## 2022-06-15 RX ADMIN — Medication 3 MG: at 22:19

## 2022-06-15 RX ADMIN — ALBUTEROL SULFATE 2.5 MG: 2.5 SOLUTION RESPIRATORY (INHALATION) at 05:24

## 2022-06-15 RX ADMIN — SUCRALFATE 1 G: 1 TABLET ORAL at 12:41

## 2022-06-15 RX ADMIN — LEVOTHYROXINE SODIUM 88 MCG: 0.09 TABLET ORAL at 07:56

## 2022-06-15 RX ADMIN — ATORVASTATIN CALCIUM 20 MG: 20 TABLET, FILM COATED ORAL at 07:56

## 2022-06-15 RX ADMIN — ALBUTEROL SULFATE 2.5 MG: 2.5 SOLUTION RESPIRATORY (INHALATION) at 19:13

## 2022-06-15 RX ADMIN — PANTOPRAZOLE SODIUM 8 MG/HR: 40 INJECTION, POWDER, FOR SOLUTION INTRAVENOUS at 07:39

## 2022-06-15 RX ADMIN — ALBUTEROL SULFATE 2.5 MG: 2.5 SOLUTION RESPIRATORY (INHALATION) at 08:18

## 2022-06-15 RX ADMIN — PANTOPRAZOLE SODIUM 8 MG/HR: 40 INJECTION, POWDER, FOR SOLUTION INTRAVENOUS at 18:20

## 2022-06-15 RX ADMIN — CEFTRIAXONE SODIUM 1000 MG: 1 INJECTION, POWDER, FOR SOLUTION INTRAMUSCULAR; INTRAVENOUS at 20:44

## 2022-06-15 RX ADMIN — TIOTROPIUM BROMIDE INHALATION SPRAY 2 PUFF: 3.12 SPRAY, METERED RESPIRATORY (INHALATION) at 08:18

## 2022-06-15 RX ADMIN — ALBUTEROL SULFATE 2.5 MG: 2.5 SOLUTION RESPIRATORY (INHALATION) at 14:55

## 2022-06-15 RX ADMIN — SUCRALFATE 1 G: 1 TABLET ORAL at 17:13

## 2022-06-15 RX ADMIN — SUCRALFATE 1 G: 1 TABLET ORAL at 22:19

## 2022-06-15 RX ADMIN — IRON SUCROSE 200 MG: 20 INJECTION, SOLUTION INTRAVENOUS at 12:38

## 2022-06-15 RX ADMIN — SUCRALFATE 1 G: 1 TABLET ORAL at 05:24

## 2022-06-15 RX ADMIN — SODIUM CHLORIDE, PRESERVATIVE FREE 10 ML: 5 INJECTION INTRAVENOUS at 07:57

## 2022-06-15 ASSESSMENT — ENCOUNTER SYMPTOMS
COUGH: 0
NAUSEA: 0
CONSTIPATION: 0
DIARRHEA: 0
SHORTNESS OF BREATH: 0
ABDOMINAL PAIN: 0
VOMITING: 0

## 2022-06-15 NOTE — PROGRESS NOTES
Writer spoke with the residents about prn anxiety meds. Pt having more anxiety and this is causing her RR to increase in addition to her awaiting an MRI of her back. She endorses some difficulty with small spaces. Claustrophobia noted on MRI form. Daughter stated anxiety has been problematic for her recently.

## 2022-06-15 NOTE — PROGRESS NOTES
GI Progress notes    6/15/2022   2:51 PM    Name:  Fernando Mirza  MRN:    [id number]     Acct:     [de-identified]   Room:  2112/2112-01   Day: 3     Admit Date: 6/12/2022  1:35 PM  PCP: Jose Enrique Salinas MD    Subjective:     C/C:   Chief Complaint   Patient presents with    Fatigue    Shortness of Breath       Interval History: Status: not changed. Patient seen and examined. No acute events overnight. Hgb did drop overnight to 6.9  Patient was transfused 1 unit of PRBCs  Post transfusion hgb remained 6.9 for which she was given another unit of PRBCs. Hgb this am 9.2    Per RN, patient had small liquid melanotic stool this morning and a moderate liquid melanotic stool this afternoon. VSS  No abdominal pain, nausea, vomiting    Discussed with admitting, plans for surgical consult. ROS:  Constitutional: negative for chills, fevers and sweats  Gastrointestinal: negative for abdominal pain, constipation, diarrhea, nausea and vomiting  Neurological: negative for dizziness and headaches    Medications:      Allergies: No Known Allergies    Current Meds: 0.9 % sodium chloride infusion, PRN  iron sucrose (VENOFER) 200 mg in sodium chloride 0.9 % 100 mL IVPB, Q24H  nicotine (NICODERM CQ) 21 MG/24HR 1 patch, Daily  0.9 % sodium chloride infusion, PRN  sucralfate (CARAFATE) tablet 1 g, 4 times per day  pantoprazole (PROTONIX) 80 mg in sodium chloride 0.9 % 100 mL infusion, Continuous  sodium chloride flush 0.9 % injection 5-40 mL, 2 times per day  sodium chloride flush 0.9 % injection 5-40 mL, PRN  0.9 % sodium chloride infusion, PRN  ondansetron (ZOFRAN-ODT) disintegrating tablet 4 mg, Q8H PRN   Or  ondansetron (ZOFRAN) injection 4 mg, Q6H PRN  polyethylene glycol (GLYCOLAX) packet 17 g, Daily PRN  acetaminophen (TYLENOL) tablet 650 mg, Q6H PRN   Or  acetaminophen (TYLENOL) suppository 650 mg, Q6H PRN  cefTRIAXone (ROCEPHIN) 1000 mg IVPB in NS 50ml minibag, Q24H  albuterol (PROVENTIL) nebulizer solution 2.5 mg, Q6H PRN  atorvastatin (LIPITOR) tablet 20 mg, Daily  levothyroxine (SYNTHROID) tablet 88 mcg, Daily  tiotropium (SPIRIVA RESPIMAT) 2.5 MCG/ACT inhaler 2 puff, Daily  0.9 % sodium chloride infusion, Continuous  melatonin tablet 3 mg, Nightly PRN        Data:     Code Status:  Full Code    History reviewed. No pertinent family history. Social History     Socioeconomic History    Marital status: Single     Spouse name: Not on file    Number of children: Not on file    Years of education: Not on file    Highest education level: Not on file   Occupational History    Not on file   Tobacco Use    Smoking status: Current Every Day Smoker     Types: Cigarettes    Smokeless tobacco: Never Used   Substance and Sexual Activity    Alcohol use: Yes     Alcohol/week: 2.0 standard drinks     Types: 2 Glasses of wine per week    Drug use: Never    Sexual activity: Not on file   Other Topics Concern    Not on file   Social History Narrative    Not on file     Social Determinants of Health     Financial Resource Strain:     Difficulty of Paying Living Expenses: Not on file   Food Insecurity:     Worried About Running Out of Food in the Last Year: Not on file    Trung of Food in the Last Year: Not on file   Transportation Needs:     Lack of Transportation (Medical): Not on file    Lack of Transportation (Non-Medical):  Not on file   Physical Activity:     Days of Exercise per Week: Not on file    Minutes of Exercise per Session: Not on file   Stress:     Feeling of Stress : Not on file   Social Connections:     Frequency of Communication with Friends and Family: Not on file    Frequency of Social Gatherings with Friends and Family: Not on file    Attends Yarsani Services: Not on file    Active Member of Clubs or Organizations: Not on file    Attends Club or Organization Meetings: Not on file    Marital Status: Not on file   Intimate Partner Violence:     Fear of Current or Ex-Partner: Not on file   Laura Reynolds Emotionally Abused: Not on file    Physically Abused: Not on file    Sexually Abused: Not on file   Housing Stability:     Unable to Pay for Housing in the Last Year: Not on file    Number of Places Lived in the Last Year: Not on file    Unstable Housing in the Last Year: Not on file       Vitals:  BP (!) 143/64   Pulse 79   Temp 98.2 °F (36.8 °C) (Oral)   Resp 22   Ht 5' 2\" (1.575 m)   Wt 132 lb 4.4 oz (60 kg)   SpO2 97%   BMI 24.19 kg/m²   Temp (24hrs), Av.3 °F (36.8 °C), Min:98 °F (36.7 °C), Max:98.6 °F (37 °C)    No results for input(s): POCGLU in the last 72 hours. I/O (24Hr):     Intake/Output Summary (Last 24 hours) at 6/15/2022 1451  Last data filed at 6/15/2022 1210  Gross per 24 hour   Intake 1590 ml   Output 350 ml   Net 1240 ml       Labs:      CBC:   Lab Results   Component Value Date    WBC 12.6 06/15/2022    RBC 2.22 06/15/2022    HGB 9.2 06/15/2022    HCT 28.1 06/15/2022    MCV 95.5 06/15/2022    MCH 31.2 06/15/2022    MCHC 32.7 06/15/2022    RDW 16.9 06/15/2022     06/15/2022    MPV 7.3 06/15/2022     CBC with Differential:    Lab Results   Component Value Date    WBC 12.6 06/15/2022    RBC 2.22 06/15/2022    HGB 9.2 06/15/2022    HCT 28.1 06/15/2022     06/15/2022    MCV 95.5 06/15/2022    MCH 31.2 06/15/2022    MCHC 32.7 06/15/2022    RDW 16.9 06/15/2022    LYMPHOPCT 11 06/15/2022    MONOPCT 8 06/15/2022    BASOPCT 1 06/15/2022    MONOSABS 1.01 06/15/2022    LYMPHSABS 1.39 06/15/2022    EOSABS 0.25 06/15/2022    BASOSABS 0.13 06/15/2022     Hemoglobin/Hematocrit:    Lab Results   Component Value Date    HGB 9.2 06/15/2022    HCT 28.1 06/15/2022     CMP:    Lab Results   Component Value Date     06/15/2022    K 4.1 06/15/2022     06/15/2022    CO2 20 06/15/2022    BUN 20 06/15/2022    CREATININE 0.54 06/15/2022    GFRAA >60 06/15/2022    LABGLOM >60 06/15/2022    GLUCOSE 109 06/15/2022    PROT 6.1 2022    LABALBU 3.7 2022    CALCIUM 7.4 06/15/2022    BILITOT <0.15 06/12/2022    ALKPHOS 76 06/12/2022    AST 15 06/12/2022    ALT 21 06/12/2022     BMP:    Lab Results   Component Value Date     06/15/2022    K 4.1 06/15/2022     06/15/2022    CO2 20 06/15/2022    BUN 20 06/15/2022    LABALBU 3.7 06/12/2022    CREATININE 0.54 06/15/2022    CALCIUM 7.4 06/15/2022    GFRAA >60 06/15/2022    LABGLOM >60 06/15/2022    GLUCOSE 109 06/15/2022     PT/INR:    Lab Results   Component Value Date    PROTIME 14.2 06/12/2022    INR 1.1 06/12/2022     PTT:    Lab Results   Component Value Date    APTT 36.0 06/12/2022   [APTT}    Physical Examination:        General appearance: alert, cooperative and no distress  Mental Status: oriented to person, place and time and normal affect  Abdomen: soft, nontender, nondistended, bowel sounds present   Extremities: no edema, redness or tenderness in the calves  Skin: no gross lesions, rashes, or induration    Assessment:        Primary Problem  Anemia     Active Hospital Problems    Diagnosis Date Noted    Acute midline low back pain without sciatica [M54.50] 06/15/2022     Priority: Medium    Upper GI bleeding [K92.2] 06/13/2022     Priority: Medium    Duodenal ulcer [K26.9] 06/13/2022     Priority: Medium    Anemia [D64.9] 06/12/2022     Priority: Medium    Leukocytosis (leucocytosis) [D72.829] 06/12/2022     Priority: Medium    Hypothyroidism [E03.9] 06/12/2022     Priority: Medium    COPD (chronic obstructive pulmonary disease) ([facility] [address] [J44.9] 06/12/2022     Priority: Medium     Past Medical History:   Diagnosis Date    [facility]     Hyperlipidemia     Hypertension     Kidney stone     Thyroid disease         Plan:        1. Epigastric pain, excessive NSAID use, melena s/p EGD revealing acute duodenal ulcer occupying entire apex of duodenal bulb  1. Melanotic stool x 2 today  2. Drop in hgb requiring 2 units of PRBCs  3. Continue PPI drip  4.  Monitor for bleeding  5. Trend H&H  6. Transfuse hgb < 7  7. CLD, no red jello  8. Surgical consult requested  9. NPO after midnight\  10. Re-evaluate in am    Explained to the patient and d/W Nursing Staff  Will F/U with you  Please call or Page for any issues or change in status  Thanks    Electronically signed by KAM Kasper NP on 6/15/2022 at 2:51 PM       GI attending physician note. Patient seen with KAM     I independently performed an evaluation on [facility]. I have reviewed the above documentation completed by the Nurse Practitioner and agree with the history, examination, and management plan. Recommendations discussed. Patient has drop of hemoglobin last night needing transfusion. No significant bowel movements. Rectal examination revealed dark greenish stool. At present patient is getting iron transfusion. Abdominal examination benign. Abdominal discomfort resolved. We will start her on liquid diet and to continue PPI therapy and sucralfate. Surgery consult on standby. Reevaluate in 24 hours. Discussed with the patient, patient's family including patient's daughter and son-in-law.

## 2022-06-15 NOTE — PROGRESS NOTES
[facility]    PROGRESS NOTE             6/15/2022    7:33 AM    Name:   Troy Blunt  MRN:     [id number]     Acct:      [de-identified]   Room:   2112/2112-01  IP Day:  3  Admit Date:  6/12/2022  1:35 PM    PCP:  Shauna Severs, MD  Code Status:  Full Code    Subjective:     C/C:   Chief Complaint   Patient presents with    Fatigue    Shortness of Breath     Interval History Status: not changed. Patient was seen and examined at bedside. She is in no acute distress. Denies any abdominal pain, chest pain, shortness of breath, hematemesis, melena, or bleeding per rectum. Hgb today is 6.9. patient will receive 1 PRBC unit. Brief History:     The patient is a 65 y.o.  Non- / non  female who with past medical history of hypertension, COPD, hypothyroidism presents with abdominal pain and fatigue that started around a month ago. Lilibeth Hinds said that she started to take Motrin for her back pain around the beginning of May.  Since then, she has been having stomach pain that started after she takes the Motrin and lasts for couple of hours.  Patient said that she was taking ibuprofen 600 more than 1 time a day, alternating with Tylenol.  She also reported melena, headache, dizziness, shortness of breath, fatigue.  Denies any fever, chills, chest pain, hematemesis, diarrhea or constipation. Patient said that she went to [facility] 3 days ago and was discharged from the ED. Norberto Baldwin had a negative head CT scan at that time.   At ER, patient is vitally stable.  Hemoglobin was found to be 4.4, WBC 21, normal platelet.  Patient received 1 unit of PRBC with Protonix bolus in the ED.   bedside renal ultrasound showed hydronephrosis versus renal cysts.  Troponin 18.   and she is admitted to the hospital for the management of anemia likely secondary to upper GI bleeding    Review of Systems:     Review of Systems   Constitutional: Negative for fatigue and fever. Respiratory: Negative for cough and shortness of breath. Cardiovascular: Negative for chest pain, palpitations and leg swelling. Gastrointestinal: Negative for abdominal pain, constipation, diarrhea, nausea and vomiting. Genitourinary: Negative. Neurological: Negative for dizziness and headaches. Medications: Allergies:  No Known Allergies    Current Meds:   Scheduled Meds:    iron sucrose  200 mg IntraVENous Q24H    nicotine  1 patch TransDERmal Daily    sucralfate  1 g Oral 4 times per day    sodium chloride flush  5-40 mL IntraVENous 2 times per day    cefTRIAXone (ROCEPHIN) IV  1,000 mg IntraVENous Q24H    atorvastatin  20 mg Oral Daily    levothyroxine  88 mcg Oral Daily    tiotropium  2 puff Inhalation Daily     Continuous Infusions:    sodium chloride      pantoprazole 8 mg/hr (22)    sodium chloride      sodium chloride 50 mL/hr at 22 0537     PRN Meds: sodium chloride, sodium chloride flush, sodium chloride, ondansetron **OR** ondansetron, polyethylene glycol, acetaminophen **OR** acetaminophen, albuterol, melatonin    Data:     Past Medical History:   has a past medical history of COPD (chronic obstructive pulmonary disease) ([facility] [address] [facility] Hyperlipidemia, Hypertension, Kidney stone, and Thyroid disease. Social History:   reports that she has been smoking cigarettes. She has never used smokeless tobacco. She reports current alcohol use of about 2.0 standard drinks of alcohol per week. She reports that she does not use drugs. Family History: History reviewed. No pertinent family history. Vitals:  BP 97/69   Pulse 83   Temp 98.3 °F (36.8 °C) (Oral)   Resp 20   Ht 5' 2\" (1.575 m)   Wt 132 lb 4.4 oz (60 kg)   SpO2 100%   BMI 24.19 kg/m²   Temp (24hrs), Av.3 °F (36.8 °C), Min:98.1 °F (36.7 °C), Max:98.6 °F (37 °C)    No results for input(s): POCGLU in the last 72 hours. I/O(24Hr):     Intake/Output Summary (Last 24 hours) at 6/15/2022 0733  Last data filed at 6/15/2022 0542  Gross per 24 hour   Intake 1604.58 ml   Output --   Net 1604.58 ml       Labs:  [unfilled]    No results found for: SPECIAL  No results found for: CULTURE    [unfilled]    Radiology:    XR LUMBAR SPINE (MIN 4 VIEWS)    Result Date: 6/13/2022  EXAMINATION: 5 XRAY VIEWS OF THE LUMBAR SPINE 6/13/2022 6:50 pm COMPARISON: None. HISTORY: ORDERING SYSTEM PROVIDED HISTORY: backache TECHNOLOGIST PROVIDED HISTORY: backache Reason for Exam: Backache FINDINGS: The bones are mildly demineralized. No bony neural foraminal narrowing. There is prominent grade 1 anterolisthesis of L5 on S1. Moderate multilevel spondylotic changes. Mild left convex lumbar curvature. Mild superior endplate concavity at S97, T12 and L1, favored to be chronic. The sacroiliac joints appear intact. Calcified atherosclerotic plaque in the abdominal aorta. 1. Prominent grade 1 anterolisthesis of L5 on S1. 2. Mild superior endplate concavity from T11-L1, favored to be chronic. If there is clinical concern for acute compression fracture, suggest correlation with CT or MRI of the lumbar spine. 3. Moderate lumbosacral spondylotic change. US RENAL COMPLETE    Result Date: 6/14/2022  EXAMINATION: RETROPERITONEAL ULTRASOUND OF THE KIDNEYS AND URINARY BLADDER 6/14/2022 COMPARISON: None HISTORY: ORDERING SYSTEM PROVIDED HISTORY: Hydronephrosis versus renal cysts on bedside ultrasound TECHNOLOGIST PROVIDED HISTORY: Hydronephrosis versus renal cysts on bedside ultrasound FINDINGS: Kidneys: The right kidney measures 1329 cm in length and the left kidney measures 11.9 cm in length. Severe right-sided hydronephrosis without source of obstruction demonstrated. No left-sided hydronephrosis. No nephrolithiasis or soft tissue mass. Severe right-sided hydronephrosis without source of obstruction demonstrated. Unremarkable ultrasound left kidney.      XR CHEST PORTABLE    Result Date: 6/12/2022  EXAMINATION: ONE XRAY VIEW OF THE CHEST 6/12/2022 2:00 pm COMPARISON: None. HISTORY: ORDERING SYSTEM PROVIDED HISTORY: shortenss of breath, hx copd TECHNOLOGIST PROVIDED HISTORY: shortenss of breath, hx copd FINDINGS: Lungs are hyperinflated suggesting COPD. No focal consolidation. No pneumothorax or pleural effusion. Cardiac and mediastinal silhouettes unremarkable. No acute osseous abnormality. Generalized osteopenia. Telemetry leads overlie the chest.     COPD. No acute findings. Physical Examination:        Physical Exam  Constitutional:       General: She is not in acute distress. Comments: Pale and ill   HENT:      Head: Normocephalic and atraumatic. Cardiovascular:      Rate and Rhythm: Normal rate and regular rhythm. Pulses: Normal pulses. Heart sounds: Normal heart sounds. No murmur heard. Pulmonary:      Breath sounds: Wheezing and rhonchi present. No rales. Abdominal:      General: Abdomen is flat. Bowel sounds are normal. There is no distension. Palpations: Abdomen is soft. There is no mass. Tenderness: There is no abdominal tenderness. Musculoskeletal:      Right lower leg: No edema. Left lower leg: No edema. Neurological:      General: No focal deficit present. Mental Status: She is alert and oriented to person, place, and time.            Assessment:        Primary Problem  Anemia    Active Hospital Problems    Diagnosis Date Noted    Upper GI bleeding [K92.2] 06/13/2022     Priority: Medium    Duodenal ulcer [K26.9] 06/13/2022     Priority: Medium    Anemia [D64.9] 06/12/2022     Priority: Medium    Leukocytosis (leucocytosis) [D72.829] 06/12/2022     Priority: Medium    Hypothyroidism [E03.9] 06/12/2022     Priority: Medium    COPD (chronic obstructive pulmonary disease) ([facility] [J44.9] 06/12/2022     Priority: Medium       Plan:        Acute anemia  secondary to bleeding duodenal ulcer due to NSAID use  -Hemoglobin is 4.4> 7. 5>7.3>6.9  -Will transfuse 1 unit of PRBC today  -Patient received 2 unit of PRBC   -H&H Q8  -GI consulted  -Endoscopy was done yesterday and showed huge duodenal ulcer  -NPO   --Protonix gtt. -Avoid NSAIDs        Leukocytosis  -WBC 21>12>11.2>12.6  -Normal lactate  -Negative chest x-ray  -negative urinalysis,  Normal CRP  -IV Rocephin         History of hypothyroidism   -TSH is 11.5  -Continue home Synthroid 88 mcg Daily         History of hypertension  - will hold home medications due to low blood pressure     Diet: NPO  DVT prophylaxis: Pneumatic compression  GI prophylaxis: Protonix gtt     Consultations:   IP CONSULT TO INTERNAL MEDICINE  IP CONSULT TO GI  IP CONSULT TO SOCIAL WORK    Mikal Chu MD  6/15/2022  7:33 AM     Attending Physician Statement    I have discussed the case of Don Cristina, including pertinent history and exam findings with the resident. I have seen and examined the patient and the key elements of the encounter have been performed by me. I agree with the assessment, plan, and orders as documented by the resident.   The patient has persistent bleeding with a drop in hemoglobin to 6.9 it is my impression that she is a candidate for surgical consultation and possible repeat EGD it is to be noted that she is dyspneic at rest due to COPD but she has no additional symptoms  Electronically signed by Amado Hensley MD on 6/15/2022 at 11:01 AM

## 2022-06-15 NOTE — PLAN OF CARE
1 U RBC given this shift for HGB 6.9    Problem: Discharge Planning  Goal: Discharge to home or other facility with appropriate resources  Outcome: Progressing   Ongoing     Problem: Pain  Goal: Verbalizes/displays adequate comfort level or baseline comfort level  Outcome: Progressing   No pain reported this shift.  SOB reported, breathing treatment given by RT    Problem: Safety - Adult  Goal: Free from fall injury  Outcome: Progressing   Met    Problem: ABCDS Injury Assessment  Goal: Absence of physical injury  Outcome: Progressing   met

## 2022-06-15 NOTE — CONSULTS
General Surgery Consult      Pt Name: Lucy Samano  MRN: [id number]  YOB: 1945  Date of evaluation: 6/15/2022  Primary Care Physician: Hernandez Quintero MD   Patient evaluated at the request of  Dr. Nita Santa  Reason for evaluation: GI bleed    SUBJECTIVE:   History of Chief Complaint:    Lucy Samano is a 65 y.o. female with PMHx COPD and hypothyroidism who presents with fatigue and weakness worsening over the past few days. She was recently diagnosed with lumbar strain and was taking motrin several times per day. Patient also noticed black stools and SOB for the past couple days. Per grandson she appeared more pale as well. She denies fevers/chills, N/V, hematemesis, BRBPR, dysuria, hematuria. Patient was mildly hypotensive in ED, 115/54. Labs in ED revealed a Hgb 4.4 and WBC 21.0. FOBT positive. She received several units PRBC. S/p EGD per GI 06/13 which revealed acute ulceration involving entire apex of duodenal bulb with some slow oozing of blood but no obvious vessel. Patient's hgb is 6.9 today, which will be repeated following another unit PRBC transfusion. Per GI, patient does appear improved and more stable. She is on PPI gtt and Carafate QID. Patient still having some dark stools but no N/V or abdominal pain. S/p tubal ligation; no other previous abdmoinal surgeries. Symptom onset has been acute for a time period of several day(s). Severity is described as severe. Course of her symptoms over time is acute. Past Medical History   has a past medical history of COPD (chronic obstructive pulmonary disease) ([address] Hyperlipidemia, Hypertension, Kidney stone, and Thyroid disease. Past Surgical History   has a past surgical history that includes Upper gastrointestinal endoscopy (N/A, 6/13/2022). Medications  Prior to Admission medications    Medication Sig Start Date End Date Taking?  Authorizing Provider   levothyroxine (SYNTHROID) 88 MCG tablet Take 88 mcg by mouth Daily   Yes Historical Provider, MD   amLODIPine (NORVASC) 5 MG tablet Take 5 mg by mouth daily   Yes Historical Provider, MD   atorvastatin (LIPITOR) 20 MG tablet Take 20 mg by mouth daily   Yes Historical Provider, MD   lisinopril (PRINIVIL;ZESTRIL) 10 MG tablet Take 10 mg by mouth daily   Yes Historical Provider, MD   tiotropium (SPIRIVA) 18 MCG inhalation capsule Inhale 18 mcg into the lungs daily   Yes Historical Provider, MD   albuterol (PROVENTIL) (2.5 MG/3ML) 0.083% nebulizer solution Take 2.5 mg by nebulization every 6 hours as needed for Wheezing   Yes Historical Provider, MD     Allergies  has No Known Allergies. Family History  family history is not on file. Social History   reports that she has been smoking cigarettes. She has never used smokeless tobacco. She reports current alcohol use of about 2.0 standard drinks of alcohol per week. She reports that she does not use drugs. Review of Systems:  General positive for  fatigue, malaise and weakness; negative for  fevers, chills and weight loss  HEENT Denies any diplopia, tinnitus or vertigo  Resp Denies any current shortness of breath, cough or wheezing  Cardiac Denies any chest pain, palpitations, claudication or edema  GI Denies any melena, hematochezia, hematemesis or pyrosis   Denies any frequency, urgency, hesitancy or incontinence  Heme Denies bruising or bleeding easily  Endocrine PMHx hypothyroidism  Neuro Denies any focal motor or sensory deficits    OBJECTIVE:   VITALS:  height is 5' 2\" (1.575 m) and weight is 132 lb 4.4 oz (60 kg). Her oral temperature is 98.2 °F (36.8 °C). Her blood pressure is 143/64 (abnormal) and her pulse is 79. Her respiration is 22 and oxygen saturation is 97%. CONSTITUTIONAL: Alert and oriented times 3, no acute distress and cooperative to examination with proper mood and affect. SKIN: Skin color, texture, turgor normal. No rashes or lesions. LYMPH: no cervical nodes, no inguinal nodes  HEENT: Head is normocephalic, atraumatic.  EOMI, PERRLA  NECK: Supple, symmetrical, trachea midline, no adenopathy, thyroid symmetric, not enlarged and no tenderness, skin normal  CHEST/LUNGS: chest symmetric with normal A/P diameter, normal respiratory rate and rhythm, lungs clear to auscultation without wheezes, rales or rhonchi. No accessory muscle use. CARDIOVASCULAR: Heart regular rate and rhythm Normal S1 and S2. . Carotid and femoral pulses 2+/4 and equal bilaterally  ABDOMEN: Normal shape. Previous tubal ligation hypogastric vertical scar(s) present. Normal bowel sounds. No bruits. Soft, nondistended, no masses or organomegaly. no evidence of hernia. Percussion: Normal without hepatosplenomegally. Tenderness: absent  RECTAL: deferred, not clinically indicated  NEUROLOGIC: There are no focalizing motor or sensory deficits. CN II-XII are grossly intact.   EXTREMITIES: no cyanosis, no clubbing and no edema    LABS:   CBC with Differential:    Lab Results   Component Value Date    WBC 12.6 06/15/2022    RBC 2.22 06/15/2022    HGB 6.9 06/15/2022    HCT 21.2 06/15/2022     06/15/2022    MCV 95.5 06/15/2022    MCH 31.2 06/15/2022    MCHC 32.7 06/15/2022    RDW 16.9 06/15/2022    LYMPHOPCT 11 06/15/2022    MONOPCT 8 06/15/2022    BASOPCT 1 06/15/2022    MONOSABS 1.01 06/15/2022    LYMPHSABS 1.39 06/15/2022    EOSABS 0.25 06/15/2022    BASOSABS 0.13 06/15/2022     BMP:    Lab Results   Component Value Date     06/15/2022    K 4.1 06/15/2022     06/15/2022    CO2 20 06/15/2022    BUN 20 06/15/2022    LABALBU 3.7 06/12/2022    CREATININE 0.54 06/15/2022    CALCIUM 7.4 06/15/2022    GFRAA >60 06/15/2022    LABGLOM >60 06/15/2022    GLUCOSE 109 06/15/2022     Hepatic Function Panel:    Lab Results   Component Value Date    ALKPHOS 76 06/12/2022    ALT 21 06/12/2022    AST 15 06/12/2022    PROT 6.1 06/12/2022    BILITOT <0.15 06/12/2022    LABALBU 3.7 06/12/2022     Calcium:    Lab Results   Component Value Date    CALCIUM 7.4 06/15/2022 Magnesium:    Lab Results   Component Value Date    MG 2.0 06/14/2022     Phosphorus:  No results found for: PHOS  PT/INR:    Lab Results   Component Value Date    PROTIME 14.2 06/12/2022    INR 1.1 06/12/2022     ABG:  No results found for: PHART, PH, XDY9IEJ, PCO2, PO2ART, PO2, IYA2RHZ, HCO3, BEART, BE, THGBART, THB, KXR0VDG, E3TAXYVQ, O2SAT  Urine Culture:  No components found for: CURINE  Blood Culture:  No components found for: CBLOOD, CFUNGUSBL  Stool Culture:  No components found for: CSTOOL    RADIOLOGY:   I have personally reviewed the following films:  XR LUMBAR SPINE (MIN 4 VIEWS)    Result Date: 6/13/2022  EXAMINATION: 5 XRAY VIEWS OF THE LUMBAR SPINE 6/13/2022 6:50 pm COMPARISON: None. HISTORY: ORDERING SYSTEM PROVIDED HISTORY: backache TECHNOLOGIST PROVIDED HISTORY: backache Reason for Exam: Backache FINDINGS: The bones are mildly demineralized. No bony neural foraminal narrowing. There is prominent grade 1 anterolisthesis of L5 on S1. Moderate multilevel spondylotic changes. Mild left convex lumbar curvature. Mild superior endplate concavity at O44, T12 and L1, favored to be chronic. The sacroiliac joints appear intact. Calcified atherosclerotic plaque in the abdominal aorta. 1. Prominent grade 1 anterolisthesis of L5 on S1. 2. Mild superior endplate concavity from T11-L1, favored to be chronic. If there is clinical concern for acute compression fracture, suggest correlation with CT or MRI of the lumbar spine. 3. Moderate lumbosacral spondylotic change. XR CHEST PORTABLE    Result Date: 6/12/2022  EXAMINATION: ONE XRAY VIEW OF THE CHEST 6/12/2022 2:00 pm COMPARISON: None. HISTORY: ORDERING SYSTEM PROVIDED HISTORY: shortenss of breath, hx copd TECHNOLOGIST PROVIDED HISTORY: shortenss of breath, hx copd FINDINGS: Lungs are hyperinflated suggesting COPD. No focal consolidation. No pneumothorax or pleural effusion. Cardiac and mediastinal silhouettes unremarkable.   No acute osseous abnormality. Generalized osteopenia. Telemetry leads overlie the chest.     COPD. No acute findings. IMPRESSION:   1. Acute upper GI bleed   2. NSAID overuse   3. Acute blood loss anemia     does not have any pertinent problems on file. PLAN:   1. Clear liquid diet  2. H&H q6 hours  3. PPI gtt and Carafate  4. No acute general surgery intervention; surgery on standby  5. Continue medical management   6. Patient was seen and examined. History of heavy NSAID use. Evidence of duodenal ulcer. Evidence of GI bleed status post EGD. Case discussed with GI physician and admitting physician. Hemoglobin noted. Improved with transfusion. So far patient has received 4 units of PRBCs. Getting iron. On clear liquids. She is on Protonix drip and Carafate. Continue medical management. Surgery on standby. Discussed with patient. Thank you for this interesting consult and for allowing us to participate in the care of this patient. If you have any questions please don't hesitate to call.       Electronically signed by BRANDON Valadez  on 6/15/2022 at 12:15 PM

## 2022-06-15 NOTE — CARE COORDINATION
ONGOING DISCHARGE PLAN:    Patient is alert and oriented x4. Spoke with patient regarding discharge plan and patient confirms that plan is still home with no needs. POD##2 EGD  With acute duodenal ulcer. HGB 4.4 on admit, HGB 6.9 received 2 units PRBC today. NPO after MN for possible EGD tomorrow. IV rocephin, IV venofer, IV protonix gtt, renal ultrasound showed right sided hydronephrosis. Will continue to follow for additional discharge needs.     Electronically signed by Daquan Sawyer RN on 6/15/2022 at 3:55 PM

## 2022-06-15 NOTE — CONSULTS
IntraVENous 2 times per day    cefTRIAXone (ROCEPHIN) IV  1,000 mg IntraVENous Q24H    atorvastatin  20 mg Oral Daily    levothyroxine  88 mcg Oral Daily    tiotropium  2 puff Inhalation Daily     PRN Meds:sodium chloride, sodium chloride, sodium chloride flush, sodium chloride, ondansetron **OR** ondansetron, polyethylene glycol, acetaminophen **OR** acetaminophen, albuterol, melatonin      Data:  CBC:   Recent Labs     06/13/22  0600 06/13/22  1535 06/14/22  0609 06/14/22  1312 06/14/22  2059 06/15/22  0702 06/15/22  1300   WBC 12.0*  --  11.2*  --   --  12.6*  --    RBC 2.40*  --  2.31*  --   --  2.22*  --    HGB 7.5*   < > 7.3*   < > 6.9* 6.9* 9.2*   HCT 22.2*   < > 23.2*   < > 20.0* 21.2* 28.1*   MCV 92.4  --  100.2*  --   --  95.5  --    RDW 17.0*  --  18.6*  --   --  16.9*  --      --  305  --   --  265  --     < > = values in this interval not displayed. BNP: No results for input(s): BNP in the last 72 hours. PT/INR: No results for input(s): PROTIME, INR in the last 72 hours. Assessment/Plan:  Principal Problem:    Anemia  Active Problems:    Leukocytosis (leucocytosis)    Hypothyroidism    COPD (chronic obstructive pulmonary disease) (HCC)    Upper GI bleeding    Duodenal ulcer    Acute midline low back pain without sciatica  Resolved Problems:    * No resolved hospital problems.  *  mri lumbar      Electronically signed by Jesus Alberto Navas MD on 6/15/2022 at 1:45 PM    [address] [place] [address]

## 2022-06-15 NOTE — PLAN OF CARE
Problem: Discharge Planning  Goal: Discharge to home or other facility with appropriate resources  6/15/2022 1731 by Aureliano Martinez RN  Outcome: Progressing     Problem: Pain  Goal: Verbalizes/displays adequate comfort level or baseline comfort level  6/15/2022 1731 by Aureliano Martinez RN  Outcome: Progressing     Problem: Safety - Adult  Goal: Free from fall injury  6/15/2022 1731 by Aureliano Martinez RN  Outcome: Progressing     Problem: ABCDS Injury Assessment  Goal: Absence of physical injury  6/15/2022 1731 by Aureliano Martinez RN  Outcome: Progressing

## 2022-06-16 ENCOUNTER — APPOINTMENT (OUTPATIENT)
Dept: MRI IMAGING | Age: 77
DRG: 378 | End: 2022-06-16
Payer: MEDICARE

## 2022-06-16 LAB
ABO/RH: NORMAL
ABSOLUTE EOS #: 0.3 K/UL (ref 0–0.4)
ABSOLUTE LYMPH #: 1.4 K/UL (ref 1–4.8)
ABSOLUTE MONO #: 0.8 K/UL (ref 0.1–1.3)
ANION GAP SERPL CALCULATED.3IONS-SCNC: 8 MMOL/L (ref 9–17)
ANTIBODY SCREEN: NEGATIVE
ARM BAND NUMBER: NORMAL
BASOPHILS # BLD: 1 % (ref 0–2)
BASOPHILS ABSOLUTE: 0.1 K/UL (ref 0–0.2)
BLD PROD TYP BPU: NORMAL
BLOOD BANK BLOOD PRODUCT EXPIRATION DATE: NORMAL
BLOOD BANK COMMENT: NORMAL
BLOOD BANK COMMENT: NORMAL
BLOOD BANK ISBT PRODUCT BLOOD TYPE: 6200
BLOOD BANK PRODUCT CODE: NORMAL
BLOOD BANK UNIT TYPE AND RH: NORMAL
BPU ID: NORMAL
BUN BLDV-MCNC: 8 MG/DL (ref 8–23)
CALCIUM SERPL-MCNC: 7.8 MG/DL (ref 8.6–10.4)
CHLORIDE BLD-SCNC: 108 MMOL/L (ref 98–107)
CO2: 22 MMOL/L (ref 20–31)
CREAT SERPL-MCNC: 0.56 MG/DL (ref 0.5–0.9)
CROSSMATCH RESULT: NORMAL
DISPENSE STATUS BLOOD BANK: NORMAL
EOSINOPHILS RELATIVE PERCENT: 4 % (ref 0–4)
EXPIRATION DATE: NORMAL
GFR AFRICAN AMERICAN: >60 ML/MIN
GFR NON-AFRICAN AMERICAN: >60 ML/MIN
GFR SERPL CREATININE-BSD FRML MDRD: ABNORMAL ML/MIN/{1.73_M2}
GLUCOSE BLD-MCNC: 108 MG/DL (ref 70–99)
HCT VFR BLD CALC: 24.5 % (ref 36–46)
HCT VFR BLD CALC: 25.3 % (ref 36–46)
HCT VFR BLD CALC: 25.4 % (ref 36–46)
HCT VFR BLD CALC: 26 % (ref 36–46)
HEMOGLOBIN: 8.3 G/DL (ref 12–16)
HEMOGLOBIN: 8.4 G/DL (ref 12–16)
HEMOGLOBIN: 8.4 G/DL (ref 12–16)
HEMOGLOBIN: 8.6 G/DL (ref 12–16)
LYMPHOCYTES # BLD: 14 % (ref 24–44)
MAGNESIUM: 1.7 MG/DL (ref 1.6–2.6)
MCH RBC QN AUTO: 31 PG (ref 26–34)
MCHC RBC AUTO-ENTMCNC: 34 G/DL (ref 31–37)
MCV RBC AUTO: 91.3 FL (ref 80–100)
MONOCYTES # BLD: 9 % (ref 1–7)
PDW BLD-RTO: 16.9 % (ref 11.5–14.9)
PLATELET # BLD: 266 K/UL (ref 150–450)
PMV BLD AUTO: 7 FL (ref 6–12)
POTASSIUM SERPL-SCNC: 3.5 MMOL/L (ref 3.7–5.3)
RBC # BLD: 2.68 M/UL (ref 4–5.2)
SEG NEUTROPHILS: 72 % (ref 36–66)
SEGMENTED NEUTROPHILS ABSOLUTE COUNT: 7.2 K/UL (ref 1.3–9.1)
SODIUM BLD-SCNC: 138 MMOL/L (ref 135–144)
TRANSFUSION STATUS: NORMAL
UNIT DIVISION: 0
UNIT ISSUE DATE/TIME: NORMAL
WBC # BLD: 9.9 K/UL (ref 3.5–11)

## 2022-06-16 PROCEDURE — 6370000000 HC RX 637 (ALT 250 FOR IP): Performed by: INTERNAL MEDICINE

## 2022-06-16 PROCEDURE — 83735 ASSAY OF MAGNESIUM: CPT

## 2022-06-16 PROCEDURE — 94640 AIRWAY INHALATION TREATMENT: CPT

## 2022-06-16 PROCEDURE — 85014 HEMATOCRIT: CPT

## 2022-06-16 PROCEDURE — 99233 SBSQ HOSP IP/OBS HIGH 50: CPT | Performed by: INTERNAL MEDICINE

## 2022-06-16 PROCEDURE — 97116 GAIT TRAINING THERAPY: CPT

## 2022-06-16 PROCEDURE — 6360000002 HC RX W HCPCS: Performed by: STUDENT IN AN ORGANIZED HEALTH CARE EDUCATION/TRAINING PROGRAM

## 2022-06-16 PROCEDURE — 72148 MRI LUMBAR SPINE W/O DYE: CPT

## 2022-06-16 PROCEDURE — 2580000003 HC RX 258: Performed by: STUDENT IN AN ORGANIZED HEALTH CARE EDUCATION/TRAINING PROGRAM

## 2022-06-16 PROCEDURE — 36415 COLL VENOUS BLD VENIPUNCTURE: CPT

## 2022-06-16 PROCEDURE — 6360000002 HC RX W HCPCS: Performed by: INTERNAL MEDICINE

## 2022-06-16 PROCEDURE — 85018 HEMOGLOBIN: CPT

## 2022-06-16 PROCEDURE — 80048 BASIC METABOLIC PNL TOTAL CA: CPT

## 2022-06-16 PROCEDURE — 6360000002 HC RX W HCPCS

## 2022-06-16 PROCEDURE — 6370000000 HC RX 637 (ALT 250 FOR IP): Performed by: NURSE PRACTITIONER

## 2022-06-16 PROCEDURE — 6370000000 HC RX 637 (ALT 250 FOR IP)

## 2022-06-16 PROCEDURE — 2580000003 HC RX 258: Performed by: INTERNAL MEDICINE

## 2022-06-16 PROCEDURE — 99232 SBSQ HOSP IP/OBS MODERATE 35: CPT | Performed by: INTERNAL MEDICINE

## 2022-06-16 PROCEDURE — 97162 PT EVAL MOD COMPLEX 30 MIN: CPT

## 2022-06-16 PROCEDURE — 2060000000 HC ICU INTERMEDIATE R&B

## 2022-06-16 PROCEDURE — 85025 COMPLETE CBC W/AUTO DIFF WBC: CPT

## 2022-06-16 PROCEDURE — 2700000000 HC OXYGEN THERAPY PER DAY

## 2022-06-16 PROCEDURE — APPSS30 APP SPLIT SHARED TIME 16-30 MINUTES: Performed by: NURSE PRACTITIONER

## 2022-06-16 PROCEDURE — 94761 N-INVAS EAR/PLS OXIMETRY MLT: CPT

## 2022-06-16 RX ORDER — MAGNESIUM SULFATE HEPTAHYDRATE 40 MG/ML
2000 INJECTION, SOLUTION INTRAVENOUS PRN
Status: DISCONTINUED | OUTPATIENT
Start: 2022-06-16 | End: 2022-06-18 | Stop reason: HOSPADM

## 2022-06-16 RX ORDER — POTASSIUM CHLORIDE 7.45 MG/ML
10 INJECTION INTRAVENOUS PRN
Status: DISCONTINUED | OUTPATIENT
Start: 2022-06-16 | End: 2022-06-18

## 2022-06-16 RX ORDER — POTASSIUM CHLORIDE 20 MEQ/1
40 TABLET, EXTENDED RELEASE ORAL PRN
Status: DISCONTINUED | OUTPATIENT
Start: 2022-06-16 | End: 2022-06-18

## 2022-06-16 RX ADMIN — SODIUM CHLORIDE, PRESERVATIVE FREE 10 ML: 5 INJECTION INTRAVENOUS at 19:35

## 2022-06-16 RX ADMIN — ALBUTEROL SULFATE 2.5 MG: 2.5 SOLUTION RESPIRATORY (INHALATION) at 03:31

## 2022-06-16 RX ADMIN — LORAZEPAM 0.5 MG: 2 INJECTION INTRAMUSCULAR; INTRAVENOUS at 12:35

## 2022-06-16 RX ADMIN — ALBUTEROL SULFATE 2.5 MG: 2.5 SOLUTION RESPIRATORY (INHALATION) at 07:58

## 2022-06-16 RX ADMIN — POTASSIUM CHLORIDE 40 MEQ: 20 TABLET, EXTENDED RELEASE ORAL at 17:37

## 2022-06-16 RX ADMIN — ALBUTEROL SULFATE 2.5 MG: 2.5 SOLUTION RESPIRATORY (INHALATION) at 15:15

## 2022-06-16 RX ADMIN — SUCRALFATE 1 G: 1 TABLET ORAL at 05:44

## 2022-06-16 RX ADMIN — CEFTRIAXONE SODIUM 1000 MG: 1 INJECTION, POWDER, FOR SOLUTION INTRAMUSCULAR; INTRAVENOUS at 19:35

## 2022-06-16 RX ADMIN — ALBUTEROL SULFATE 2.5 MG: 2.5 SOLUTION RESPIRATORY (INHALATION) at 20:08

## 2022-06-16 RX ADMIN — SUCRALFATE 1 G: 1 TABLET ORAL at 12:34

## 2022-06-16 RX ADMIN — SUCRALFATE 1 G: 1 TABLET ORAL at 17:37

## 2022-06-16 RX ADMIN — TIOTROPIUM BROMIDE INHALATION SPRAY 2 PUFF: 3.12 SPRAY, METERED RESPIRATORY (INHALATION) at 08:05

## 2022-06-16 RX ADMIN — LEVOTHYROXINE SODIUM 88 MCG: 0.09 TABLET ORAL at 05:44

## 2022-06-16 RX ADMIN — SODIUM CHLORIDE, PRESERVATIVE FREE 10 ML: 5 INJECTION INTRAVENOUS at 08:20

## 2022-06-16 RX ADMIN — ATORVASTATIN CALCIUM 20 MG: 20 TABLET, FILM COATED ORAL at 08:20

## 2022-06-16 RX ADMIN — ALBUTEROL SULFATE 2.5 MG: 2.5 SOLUTION RESPIRATORY (INHALATION) at 11:13

## 2022-06-16 RX ADMIN — IRON SUCROSE 200 MG: 20 INJECTION, SOLUTION INTRAVENOUS at 12:42

## 2022-06-16 ASSESSMENT — ENCOUNTER SYMPTOMS
SHORTNESS OF BREATH: 1
ABDOMINAL DISTENTION: 0
BLOOD IN STOOL: 1
DIARRHEA: 0
COUGH: 0
VOMITING: 0
NAUSEA: 0
ABDOMINAL PAIN: 0

## 2022-06-16 NOTE — PLAN OF CARE
Problem: Discharge Planning  Goal: Discharge to home or other facility with appropriate resources  6/16/2022 0901 by Jaspal Flores RN  Outcome: Progressing     Problem: Pain  Goal: Verbalizes/displays adequate comfort level or baseline comfort level  6/16/2022 0901 by Jaspal Flores RN  Outcome: Progressing     Problem: Safety - Adult  Goal: Free from fall injury  6/16/2022 0901 by Jaspal Flores RN  Outcome: Progressing

## 2022-06-16 NOTE — PROGRESS NOTES
[facility]                 PATIENT NAME: Shari Wade     TODAY'S DATE: 6/16/2022, 10:02 AM    SUBJECTIVE:    Pt seen and examined. Afebrile, VSS. Leukocytosis improved, hemoglobin low but appears stable. Patient states she did not sleep well last night and is very tired this morning. Denies abdominal pain. Patient states BM's are dark black/green now. Was tolerating liquids last night; NPO after midnight per GI. She states she is hungry. No N/V.      OBJECTIVE:   VITALS:  /84   Pulse 82   Temp 98.2 °F (36.8 °C) (Oral)   Resp 22   Ht 5' 2\" (1.575 m)   Wt 132 lb 4.4 oz (60 kg)   SpO2 100%   BMI 24.19 kg/m²      INTAKE/OUTPUT:      Intake/Output Summary (Last 24 hours) at 6/16/2022 1002  Last data filed at 6/15/2022 1834  Gross per 24 hour   Intake 505.42 ml   Output --   Net 505.42 ml                 CONSTITUTIONAL:  awake and alert.   No acute distress  HEART:   RRR  LUNGS:   No wheezing, on O2 via NC  ABDOMEN:   Abdomen soft, non-tender, non-distended  EXTREMITIES:   No pedal edema    Data:  CBC:   Lab Results   Component Value Date    WBC 9.9 06/16/2022    RBC 2.68 06/16/2022    HGB 8.3 06/16/2022    HCT 24.5 06/16/2022    MCV 91.3 06/16/2022    MCH 31.0 06/16/2022    MCHC 34.0 06/16/2022    RDW 16.9 06/16/2022     06/16/2022    MPV 7.0 06/16/2022     BMP:    Lab Results   Component Value Date     06/16/2022    K 3.5 06/16/2022     06/16/2022    CO2 22 06/16/2022    BUN 8 06/16/2022    LABALBU 3.7 06/12/2022    CREATININE 0.56 06/16/2022    CALCIUM 7.8 06/16/2022    GFRAA >60 06/16/2022    LABGLOM >60 06/16/2022    GLUCOSE 108 06/16/2022       Radiology Review:  No new image to review       ASSESSMENT     Principal Problem:    Anemia  Active Problems:    Leukocytosis (leucocytosis)    Hypothyroidism    COPD (chronic obstructive pulmonary disease) (HCC)    Upper GI bleeding    Duodenal ulcer    Acute midline low back pain without sciatica  Resolved Problems:    * No resolved hospital problems. *      Plan  1. NPO; advance diet per GI  2. Trend H&H q6 hours  3. Surgery on standby  4.  Continue medical management       Electronically signed by Casie Carnes PA-C  [id number] [address]

## 2022-06-16 NOTE — PLAN OF CARE
Problem: Discharge Planning  Goal: Discharge to home or other facility with appropriate resources  6/16/2022 0521 by Benjamin Arias RN  Outcome: Progressing     Problem: Pain  Goal: Verbalizes/displays adequate comfort level or baseline comfort level  6/16/2022 0521 by Benjamin Arias RN  Outcome: Progressing     Problem: Safety - Adult  Goal: Free from fall injury  6/16/2022 0521 by Benjamin Arias RN  Outcome: Progressing     Problem: ABCDS Injury Assessment  Goal: Absence of physical injury  6/16/2022 0521 by Benjamin Arias RN  Outcome: Progressing

## 2022-06-16 NOTE — PROGRESS NOTES
GI Progress notes    6/16/2022   10:56 AM    Name:  Rachel Bridges  MRN:    [id number]     Acct:     [de-identified]   Room:  2112/2112-01   Day: 4     Admit Date: 6/12/2022  1:35 PM  PCP: Negrito Grove MD    Subjective:     C/C:   Chief Complaint   Patient presents with    Fatigue    Shortness of Breath       Interval History: Status: not changed. Patient seen and examined. No acute events overnight. No reported BM overnight. Reports last night they appeared more dark green. Hgb relatively stable. Feels hungry  Reports fatigue. No abdominal pain, nausea, vomiting    ROS:  Constitutional: negative for chills, fevers and sweats  Gastrointestinal: negative for abdominal pain, constipation, diarrhea, nausea and vomiting  Neurological: negative for dizziness and headaches    Medications:      Allergies: No Known Allergies    Current Meds: iron sucrose (VENOFER) 200 mg in sodium chloride 0.9 % 100 mL IVPB, Q24H  potassium chloride (KLOR-CON M) extended release tablet 40 mEq, PRN   Or  potassium bicarb-citric acid (EFFER-K) effervescent tablet 40 mEq, PRN   Or  potassium chloride 10 mEq/100 mL IVPB (Peripheral Line), PRN  magnesium sulfate 2000 mg in water 50 mL IVPB, PRN  sodium phosphate 10 mmol in sodium chloride 0.9 % 250 mL IVPB, PRN   Or  sodium phosphate 15 mmol in sodium chloride 0.9 % 250 mL IVPB, PRN   Or  sodium phosphate 20 mmol in sodium chloride 0.9 % 500 mL IVPB, PRN  albuterol (PROVENTIL) nebulizer solution 2.5 mg, 4x daily  LORazepam (ATIVAN) injection 0.5 mg, Q6H PRN  nicotine (NICODERM CQ) 21 MG/24HR 1 patch, Daily  sucralfate (CARAFATE) tablet 1 g, 4 times per day  pantoprazole (PROTONIX) 80 mg in sodium chloride 0.9 % 100 mL infusion, Continuous  sodium chloride flush 0.9 % injection 5-40 mL, 2 times per day  sodium chloride flush 0.9 % injection 5-40 mL, PRN  0.9 % sodium chloride infusion, PRN  ondansetron (ZOFRAN-ODT) disintegrating tablet 4 mg, Q8H PRN   Or  ondansetron (ZOFRAN) injection 4 mg, Q6H PRN  polyethylene glycol (GLYCOLAX) packet 17 g, Daily PRN  acetaminophen (TYLENOL) tablet 650 mg, Q6H PRN   Or  acetaminophen (TYLENOL) suppository 650 mg, Q6H PRN  cefTRIAXone (ROCEPHIN) 1000 mg IVPB in NS 50ml minibag, Q24H  albuterol (PROVENTIL) nebulizer solution 2.5 mg, Q6H PRN  atorvastatin (LIPITOR) tablet 20 mg, Daily  levothyroxine (SYNTHROID) tablet 88 mcg, Daily  tiotropium (SPIRIVA RESPIMAT) 2.5 MCG/ACT inhaler 2 puff, Daily  0.9 % sodium chloride infusion, Continuous  melatonin tablet 3 mg, Nightly PRN        Data:     Code Status:  Full Code    History reviewed. No pertinent family history. Social History     Socioeconomic History    Marital status: Single     Spouse name: Not on file    Number of children: Not on file    Years of education: Not on file    Highest education level: Not on file   Occupational History    Not on file   Tobacco Use    Smoking status: Current Every Day Smoker     Types: Cigarettes    Smokeless tobacco: Never Used   Substance and Sexual Activity    Alcohol use: Yes     Alcohol/week: 2.0 standard drinks     Types: 2 Glasses of wine per week    Drug use: Never    Sexual activity: Not on file   Other Topics Concern    Not on file   Social History Narrative    Not on file     Social Determinants of Health     Financial Resource Strain:     Difficulty of Paying Living Expenses: Not on file   Food Insecurity:     Worried About Running Out of Food in the Last Year: Not on file    Trung of Food in the Last Year: Not on file   Transportation Needs:     Lack of Transportation (Medical): Not on file    Lack of Transportation (Non-Medical):  Not on file   Physical Activity:     Days of Exercise per Week: Not on file    Minutes of Exercise per Session: Not on file   Stress:     Feeling of Stress : Not on file   Social Connections:     Frequency of Communication with Friends and Family: Not on file    Frequency of Social Gatherings with Friends and Family: Not on file    Attends Sikhism Services: Not on file    Active Member of Clubs or Organizations: Not on file    Attends Club or Organization Meetings: Not on file    Marital Status: Not on file   Intimate Partner Violence:     Fear of Current or Ex-Partner: Not on file    Emotionally Abused: Not on file    Physically Abused: Not on file    Sexually Abused: Not on file   Housing Stability:     Unable to Pay for Housing in the Last Year: Not on file    Number of [place] in the Last Year: Not on file    Unstable Housing in the Last Year: Not on file       Vitals:  /84   Pulse 82   Temp 98.2 °F (36.8 °C) (Oral)   Resp 22   Ht 5' 2\" (1.575 m)   Wt 132 lb 4.4 oz (60 kg)   SpO2 100%   BMI 24.19 kg/m²   Temp (24hrs), Av.2 °F (36.8 °C), Min:97.9 °F (36.6 °C), Max:98.3 °F (36.8 °C)    No results for input(s): POCGLU in the last 72 hours. I/O (24Hr):     Intake/Output Summary (Last 24 hours) at 2022 1056  Last data filed at 6/15/2022 1834  Gross per 24 hour   Intake 505.42 ml   Output --   Net 505.42 ml       Labs:      CBC:   Lab Results   Component Value Date    WBC 9.9 2022    RBC 2.68 2022    HGB 8.3 2022    HCT 24.5 2022    MCV 91.3 2022    MCH 31.0 2022    MCHC 34.0 2022    RDW 16.9 2022     2022    MPV 7.0 2022     CBC with Differential:    Lab Results   Component Value Date    WBC 9.9 2022    RBC 2.68 2022    HGB 8.3 2022    HCT 24.5 2022     2022    MCV 91.3 2022    MCH 31.0 2022    MCHC 34.0 2022    RDW 16.9 2022    LYMPHOPCT 14 2022    MONOPCT 9 2022    BASOPCT 1 2022    MONOSABS 0.80 2022    LYMPHSABS 1.40 2022    EOSABS 0.30 2022    BASOSABS 0.10 2022     Hemoglobin/Hematocrit:    Lab Results   Component Value Date    HGB 8.3 2022    HCT 24.5 2022     CMP:    Lab Results Component Value Date     06/16/2022    K 3.5 06/16/2022     06/16/2022    CO2 22 06/16/2022    BUN 8 06/16/2022    CREATININE 0.56 06/16/2022    GFRAA >60 06/16/2022    LABGLOM >60 06/16/2022    GLUCOSE 108 06/16/2022    PROT 6.1 06/12/2022    LABALBU 3.7 06/12/2022    CALCIUM 7.8 06/16/2022    BILITOT <0.15 06/12/2022    ALKPHOS 76 06/12/2022    AST 15 06/12/2022    ALT 21 06/12/2022     BMP:    Lab Results   Component Value Date     06/16/2022    K 3.5 06/16/2022     06/16/2022    CO2 22 06/16/2022    BUN 8 06/16/2022    LABALBU 3.7 06/12/2022    CREATININE 0.56 06/16/2022    CALCIUM 7.8 06/16/2022    GFRAA >60 06/16/2022    LABGLOM >60 06/16/2022    GLUCOSE 108 06/16/2022     PT/INR:    Lab Results   Component Value Date    PROTIME 14.2 06/12/2022    INR 1.1 06/12/2022     PTT:    Lab Results   Component Value Date    APTT 36.0 06/12/2022   [APTT}    Physical Examination:        General appearance: alert, cooperative and no distress  Mental Status: oriented to person, place and time and normal affect  Abdomen: soft, nontender, nondistended, bowel sounds present  Extremities: no edema, redness or tenderness in the calves  Skin: no gross lesions, rashes, or induration    Assessment:        Primary Problem  Anemia     Active Hospital Problems    Diagnosis Date Noted    Acute midline low back pain without sciatica [M54.50] 06/15/2022     Priority: Medium    Upper GI bleeding [K92.2] 06/13/2022     Priority: Medium    Duodenal ulcer [K26.9] 06/13/2022     Priority: Medium    Anemia [D64.9] 06/12/2022     Priority: Medium    Leukocytosis (leucocytosis) [D72.829] 06/12/2022     Priority: Medium    Hypothyroidism [E03.9] 06/12/2022     Priority: Medium    COPD (chronic obstructive pulmonary disease) ([facility] [J44.9] 06/12/2022     Priority: Medium     Past Medical History:   Diagnosis Date    COPD (chronic obstructive pulmonary disease) ([facility]     Hyperlipidemia     Hypertension     Kidney stone     Thyroid disease         Plan:        1. Epigastric pain, excessive NSAID use, melena s/p EGD revealing acute duodenal ulcer occupying entire apex of duodenal bulb  1. No BM overnight  2. Hgb stable  3. Continue H&H Q6  4. PPI drip  5. Monitor for bleeding  6. Trend H&H  7. Transfuse hgb < 7  8. May have soft diet  9. Surgery on standby     Explained to the patient and d/W Nursing Staff  Will F/U with you  Please call or Page for any issues or change in status  Thanks    Electronically signed by KAM Bullard NP on 6/16/2022 at 10:56 AM     GI attending physician note. Patient seen with KAM   I independently performed an evaluation on Alicia Llanes. I have reviewed the above documentation completed by the Nurse Practitioner and agree with the history, examination, and management plan. Recommendations discussed. No signs of bleeding overnight. Hemoglobin has been stable. Denies abdominal pain. No nausea vomiting. Patient is going to have MRI. Following that patient can have soft diet. Discussed with the patient and family. Still will need to watch her closely for couple of days. Discussed with Dr. Yina Magdaleno as well.

## 2022-06-16 NOTE — PROGRESS NOTES
[address]    PROGRESS NOTE             6/16/2022    12:56 PM    Name:   Jovan Brush  MRN:     [id number]     Acct:      [de-identified]   Room:   [facility] Day:  4  Admit Date:  6/12/2022  1:35 PM    PCP:  Phan Perez MD  Code Status:  Full Code    Subjective:     C/C:   Chief Complaint   Patient presents with    Fatigue    Shortness of Breath     Interval History Status: improved. Patient seen and examined at bedside. No acute events overnight. Patient denies any complaints this morning but does mention she is tired from being woken up throughout the night. Does mention bowel movements do have some blood and a bit of black tarry color but overall look improved from prior. NPO after midnight per GI team    Review of Systems:     Review of Systems   Constitutional: Positive for fatigue. Negative for chills and fever. Respiratory: Positive for shortness of breath. Negative for cough. Cardiovascular: Negative for chest pain, palpitations and leg swelling. Gastrointestinal: Positive for blood in stool. Negative for abdominal distention, abdominal pain, diarrhea, nausea and vomiting. Genitourinary: Negative for difficulty urinating. Neurological: Negative for dizziness. Psychiatric/Behavioral: Negative for agitation. Medications:      Allergies:  No Known Allergies    Current Meds:   Scheduled Meds:    iron sucrose  200 mg IntraVENous Q24H    albuterol  2.5 mg Nebulization 4x daily    nicotine  1 patch TransDERmal Daily    sucralfate  1 g Oral 4 times per day    sodium chloride flush  5-40 mL IntraVENous 2 times per day    cefTRIAXone (ROCEPHIN) IV  1,000 mg IntraVENous Q24H    atorvastatin  20 mg Oral Daily    levothyroxine  88 mcg Oral Daily    tiotropium  2 puff Inhalation Daily     Continuous Infusions:    pantoprazole 8 mg/hr (06/15/22 1820)    sodium chloride      sodium chloride 50 mL/hr at 22 0537     PRN Meds: potassium chloride **OR** potassium alternative oral replacement **OR** potassium chloride, magnesium sulfate, sodium phosphate IVPB **OR** sodium phosphate IVPB **OR** sodium phosphate IVPB, LORazepam, sodium chloride flush, sodium chloride, ondansetron **OR** ondansetron, polyethylene glycol, acetaminophen **OR** acetaminophen, albuterol, melatonin    Data:     Past Medical History:   has a past medical history of COPD (chronic obstructive pulmonary disease) ([address] Hyperlipidemia, Hypertension, Kidney stone, and Thyroid disease. Social History:   reports that she has been smoking cigarettes. She has never used smokeless tobacco. She reports current alcohol use of about 2.0 standard drinks of alcohol per week. She reports that she does not use drugs. Family History: History reviewed. No pertinent family history. Vitals:  BP (!) 135/58   Pulse 86   Temp 98.3 °F (36.8 °C) (Oral)   Resp 18   Ht 5' 2\" (1.575 m)   Wt 132 lb 4.4 oz (60 kg)   SpO2 98%   BMI 24.19 kg/m²   Temp (24hrs), Av.2 °F (36.8 °C), Min:97.9 °F (36.6 °C), Max:98.3 °F (36.8 °C)    No results for input(s): POCGLU in the last 72 hours. I/O(24Hr): Intake/Output Summary (Last 24 hours) at 2022 1256  Last data filed at 2022 1141  Gross per 24 hour   Intake 120 ml   Output 400 ml   Net -280 ml       Labs:  [unfilled]    No results found for: SPECIAL  No results found for: CULTURE    [unfilled]    Radiology:    XR LUMBAR SPINE (MIN 4 VIEWS)    Result Date: 2022  EXAMINATION: 5 XRAY VIEWS OF THE LUMBAR SPINE 2022 6:50 pm COMPARISON: None. HISTORY: ORDERING SYSTEM PROVIDED HISTORY: backache TECHNOLOGIST PROVIDED HISTORY: backache Reason for Exam: Backache FINDINGS: The bones are mildly demineralized. No bony neural foraminal narrowing. There is prominent grade 1 anterolisthesis of L5 on S1. Moderate multilevel spondylotic changes. Mild left convex lumbar curvature.   Mild superior endplate concavity at K91, T12 and L1, favored to be chronic. The sacroiliac joints appear intact. Calcified atherosclerotic plaque in the abdominal aorta. 1. Prominent grade 1 anterolisthesis of L5 on S1. 2. Mild superior endplate concavity from T11-L1, favored to be chronic. If there is clinical concern for acute compression fracture, suggest correlation with CT or MRI of the lumbar spine. 3. Moderate lumbosacral spondylotic change. US RENAL COMPLETE    Result Date: 6/14/2022  EXAMINATION: RETROPERITONEAL ULTRASOUND OF THE KIDNEYS AND URINARY BLADDER 6/14/2022 COMPARISON: None HISTORY: ORDERING SYSTEM PROVIDED HISTORY: Hydronephrosis versus renal cysts on bedside ultrasound TECHNOLOGIST PROVIDED HISTORY: Hydronephrosis versus renal cysts on bedside ultrasound FINDINGS: Kidneys: The right kidney measures 1329 cm in length and the left kidney measures 11.9 cm in length. Severe right-sided hydronephrosis without source of obstruction demonstrated. No left-sided hydronephrosis. No nephrolithiasis or soft tissue mass. Severe right-sided hydronephrosis without source of obstruction demonstrated. Unremarkable ultrasound left kidney. XR CHEST PORTABLE    Result Date: 6/12/2022  EXAMINATION: ONE XRAY VIEW OF THE CHEST 6/12/2022 2:00 pm COMPARISON: None. HISTORY: ORDERING SYSTEM PROVIDED HISTORY: shortenss of breath, hx copd TECHNOLOGIST PROVIDED HISTORY: shortenss of breath, hx copd FINDINGS: Lungs are hyperinflated suggesting COPD. No focal consolidation. No pneumothorax or pleural effusion. Cardiac and mediastinal silhouettes unremarkable. No acute osseous abnormality. Generalized osteopenia. Telemetry leads overlie the chest.     COPD. No acute findings. Physical Examination:        Physical Exam  Constitutional:       Comments: Pale appearing    Cardiovascular:      Rate and Rhythm: Normal rate and regular rhythm. Pulses: Normal pulses. Heart sounds: Normal heart sounds.  No murmur heard. No gallop. Pulmonary:      Effort: Pulmonary effort is normal.      Breath sounds: Normal breath sounds. No wheezing, rhonchi or rales. Abdominal:      General: Bowel sounds are normal. There is no distension. Palpations: Abdomen is soft. Tenderness: There is no abdominal tenderness. Musculoskeletal:      Right lower leg: No edema. Left lower leg: No edema. Skin:     General: Skin is warm. Neurological:      Mental Status: She is alert. Psychiatric:         Mood and Affect: Mood normal.           Assessment:        Primary Problem  Anemia    Active Hospital Problems    Diagnosis Date Noted    Acute midline low back pain without sciatica [M54.50] 06/15/2022     Priority: Medium    Upper GI bleeding [K92.2] 06/13/2022     Priority: Medium    Duodenal ulcer [K26.9] 06/13/2022     Priority: Medium    Anemia [D64.9] 06/12/2022     Priority: Medium    Leukocytosis (leucocytosis) [D72.829] 06/12/2022     Priority: Medium    Hypothyroidism [E03.9] 06/12/2022     Priority: Medium    COPD (chronic obstructive pulmonary disease) ([facility] [address] [J44.9] 06/12/2022     Priority: Medium       Plan:        Anemia 2/2 acute blood loss from duodenal ulcer 2/2 NSAID use  -Hemoglobin is 4.4->8.3  -Patient received 4 unit of PRBC   -H&H Q8  -GI consulted   - EGD showed large duodenal ulcer  -NPO after midnight per GI  -Protonix gtt.   -Carafate   -General surgery consulted    - On standby if needed    Hypokalemia   - K 3.5   - Replacement protocol     Leukocytosis, resolved  -WBC 21>9.9  -Normal lactate  -Negative chest x-ray  -negative urinalysis, CRP wnl  -IV Rocephin      Hypothyroidism   -TSH is 11.5  -Continue home Synthroid 88 mcg Daily      Hypertension  -hold home medications due to low blood pressure     Diet: NPO  DVT prophylaxis: Pneumatic compression  GI prophylaxis: Protonix gtt    Jessica Jenkins MD  6/16/2022  12:56 PM     Attending Physician Statement    I have discussed the case of Sarah Alves, including pertinent history and exam findings with the resident. I have seen and examined the patient and the key elements of the encounter have been performed by me. I agree with the assessment, plan, and orders as documented by the resident. Her hemoglobin is 8.3, at this time there is no plan for repeat EGD. We will be observed till tomorrow and then discharged.   Electronically signed by Tiffanie England MD on 6/16/2022 at 12:56 PM

## 2022-06-16 NOTE — CARE COORDINATION
ONGOING DISCHARGE PLAN:    Patient is alert and oriented x4. Spoke with patient regarding discharge plan and patient confirms that plan is still to go home with dtr. Pt stated she isn't going to her home until she recovers. Pt denies needs for VNS. POD#3 EGD duodenal ulcers seen. IV rocephin , IV venofer, protonix gtt, NPO pt is hoping to advance diet today. HGB 8.3 today    Will continue to follow for additional discharge needs.     Electronically signed by Devika Fortune RN on 6/16/2022 at 10:16 AM

## 2022-06-16 NOTE — PROGRESS NOTES
Physical Therapy  Facility/Department: [address] [place] [address]  Physical Therapy Initial Assessment    Name: Donnetta Fleischer  : 1945  MRN: [id number]  Date of Service: 2022    Discharge Recommendations:  Patient would benefit from continued therapy after discharge          Patient Diagnosis(es): The encounter diagnosis was Anemia, unspecified type. Past Medical History:  has a past medical history of COPD (chronic obstructive pulmonary disease) ([address] Hyperlipidemia, Hypertension, Kidney stone, and Thyroid disease. Past Surgical History:  has a past surgical history that includes Upper gastrointestinal endoscopy (N/A, 2022). Assessment   Assessment: Pt presents with generalized weakness, decreased endurnace. Will benefit from conintued thrapy at discharge  Treatment Diagnosis: Weakenss  Therapy Prognosis: Good  Decision Making: Medium Complexity  Requires PT Follow-Up: Yes  Activity Tolerance  Activity Tolerance: Patient tolerated treatment well;Patient limited by fatigue;Patient limited by endurance     Plan   Plan  Plan: 5-7 times per week  Current Treatment Recommendations: Strengthening,Balance training,Functional mobility training,Transfer training,Endurance training,Gait training,Stair training,Safety education & training,Patient/Caregiver education & training  Safety Devices  Type of Devices: Gait belt,Call light within reach,Left in bed     Restrictions  Restrictions/Precautions  Restrictions/Precautions: General Precautions,Up as Tolerated  Required Braces or Orthoses?: No     Subjective   General  Patient assessed for rehabilitation services?: Yes  Additional Pertinent Hx: The patient is a 65 y.o. Non- / non  female who with past medical history of hypertension, COPD, hypothyroidism presents with abdominal pain and fatigue that started around a month ago. She also reported melena, headache, dizziness, shortness of breath, fatigue. At ER, patient is vitally stable.   Hemoglobin was found to be 4.4, WBC 21, normal platelet. Patient received 1 unit of PRBC with Protonix bolus in the ED.   bedside renal ultrasound showed hydronephrosis versus renal cysts. Admitted for Anemina and upper GI bleed. Family / Caregiver Present:  (2 son's were presetn , briefly left the room and came back)  Referral Date : 06/12/22  Diagnosis: Anemia  Follows Commands: Within Functional Limits         Social/Functional History  Social/Functional History  Lives With: Daughter  Type of Home: House  Home Layout: Two level,Performs ADL's on one level  Home Access: Level entry,Stairs to enter without rails  Entrance Stairs - Number of Steps: 1  Entrance Stairs - Rails: None  Bathroom Shower/Tub: Tub/Shower unit,Doors,Walk-in shower  Bathroom Toilet: Handicap height  Bathroom Equipment: Grab bars in shower,Hand-held shower  Bathroom Accessibility: Accessible  Home Equipment: Oxygen (2 to 3 lt o2 PRN)  Has the patient had two or more falls in the past year or any fall with injury in the past year?: No  Receives Help From: Family  ADL Assistance: [address] [place] [address] Avenue: Independent  Homemaking Responsibilities: No  Ambulation Assistance: Independent  Transfer Assistance: Independent  Active : No  Patient's  Info: Daughter  Mode of Transportation: Car  Occupation: Retired  IADL Comments: Sleeps on normal flat bed  Additional Comments: Pt normally lives alone, but will be leaving the hospital and going to stay with her daughter. Daughter reports pt will be moving into one story house nearby her next month. Daughter will be able to assist as needed. Daughter works part time, has a grand daughter lives closeby who does not work. Per pt, she will have michele one with her at  all times at home.    Vision/Hearing  Vision  Vision Exceptions: Wears glasses for reading  Hearing  Hearing: Within functional limits    Cognition         Objective   Heart Rate: 86  Heart Rate Source: Monitor  BP: (!) 135/58  BP Location: Left upper arm  BP Method: Automatic  MAP (Calculated): 83.67  Resp: 18  SpO2: 98 %  O2 Device: Nasal cannula (2 lt)              AROM RLE (degrees)  RLE AROM: WFL  AROM LLE (degrees)  LLE AROM : WFL  Strength RLE  Comment: 4-/5  Strength LLE  Comment: 4-/5     Sensation  Overall Sensation Status: Impaired (Occasional numbness L hand)     Bed mobility  Rolling to Left: Stand by assistance  Supine to Sit: Stand by assistance  Sit to Supine: Stand by assistance  Scooting: Stand by assistance  Bed Mobility Comments: HOB lowered, use of handrails  Transfers  Sit to Stand: Stand by assistance  Stand to sit: Stand by assistance  Comment: Toilet amanda SBA, pt perform pericare on her own. Ambulation  Surface: level tile  Device: No Device  Other Apparatus: O2 (2 lt o2)  Assistance: Stand by assistance  Quality of Gait: Slow donna, as she feels weak. Sao2 > 90% with activity with use of 2 lt o2. Pt does have fatiqu after activity  Distance: 65 ft  Comments: Therapist managing IV pole an o2 line for safety. Balance  Posture: Fair  Sitting - Static: Good  Sitting - Dynamic: Fair;+  Standing - Static: Good  Standing - Dynamic: Fair;+           OutComes Score                                                  AM-PAC Score  AM-PAC Inpatient Mobility Raw Score : 20 (06/16/22 1330)  AM-PAC Inpatient T-Scale Score : 47.67 (06/16/22 1330)  Mobility Inpatient CMS 0-100% Score: 35.83 (06/16/22 1330)  Mobility Inpatient CMS G-Code Modifier : CJ (06/16/22 1330)          Goals  Short Term Goals  Time Frame for Short term goals: 4 vists  Short term goal 1: Pt able to perform transfers at supervision level.   Short term goal 2:  Pt able to ambulate without device, with least amount of supplemental o2, distance fo atleast 100 ft, Sba  Short term goal 3: Pt able to perform 4\" step ups x 4 , 1 UE support SBA  Short term goal 4: Pt able to tolerate 30 minutes of therapautic activity to improve strength andendurance for safe DC home. Patient Goals   Patient goals : Feel better       Education  Patient Education  Education Given To: Patient  Education Provided: Role of Therapy;Plan of Care;Transfer Training  Education Provided Comments: Pt educated in increasing activity, start sitting up at EOB couple times/day, get up to chiar-all with assistance from staff to improve endurance/strength.   Education Method: Demonstration;Verbal  Education Outcome: Verbalized understanding;Demonstrated understanding;Continued education needed      Therapy Time   Individual Concurrent Group Co-treatment   Time In 1138         Time Out 1206         Minutes 28         Timed Code Treatment Minutes: 0923 [facility] ELIUD Grewal

## 2022-06-17 ENCOUNTER — APPOINTMENT (OUTPATIENT)
Dept: VASCULAR LAB | Age: 77
DRG: 378 | End: 2022-06-17
Payer: MEDICARE

## 2022-06-17 PROBLEM — M43.00 SPONDYLOLYSIS WITH SPONDYLOLISTHESIS: Status: ACTIVE | Noted: 2022-06-17

## 2022-06-17 PROBLEM — M48.061 SPINAL STENOSIS, LUMBAR REGION, WITHOUT NEUROGENIC CLAUDICATION: Status: ACTIVE | Noted: 2022-06-17

## 2022-06-17 PROBLEM — M43.10 SPONDYLOLYSIS WITH SPONDYLOLISTHESIS: Status: ACTIVE | Noted: 2022-06-17

## 2022-06-17 LAB
ABSOLUTE EOS #: 0.4 K/UL (ref 0–0.4)
ABSOLUTE LYMPH #: 1.5 K/UL (ref 1–4.8)
ABSOLUTE MONO #: 0.9 K/UL (ref 0.1–1.3)
ANION GAP SERPL CALCULATED.3IONS-SCNC: 9 MMOL/L (ref 9–17)
BASOPHILS # BLD: 1 % (ref 0–2)
BASOPHILS ABSOLUTE: 0.1 K/UL (ref 0–0.2)
BUN BLDV-MCNC: 6 MG/DL (ref 8–23)
CALCIUM SERPL-MCNC: 8.1 MG/DL (ref 8.6–10.4)
CHLORIDE BLD-SCNC: 108 MMOL/L (ref 98–107)
CO2: 21 MMOL/L (ref 20–31)
CREAT SERPL-MCNC: 0.61 MG/DL (ref 0.5–0.9)
EOSINOPHILS RELATIVE PERCENT: 4 % (ref 0–4)
GFR AFRICAN AMERICAN: >60 ML/MIN
GFR NON-AFRICAN AMERICAN: >60 ML/MIN
GFR SERPL CREATININE-BSD FRML MDRD: ABNORMAL ML/MIN/{1.73_M2}
GLUCOSE BLD-MCNC: 102 MG/DL (ref 70–99)
HCT VFR BLD CALC: 25.7 % (ref 36–46)
HCT VFR BLD CALC: 26.1 % (ref 36–46)
HEMOGLOBIN: 8.6 G/DL (ref 12–16)
HEMOGLOBIN: 9.1 G/DL (ref 12–16)
LYMPHOCYTES # BLD: 14 % (ref 24–44)
MCH RBC QN AUTO: 31.7 PG (ref 26–34)
MCHC RBC AUTO-ENTMCNC: 33.6 G/DL (ref 31–37)
MCV RBC AUTO: 94.4 FL (ref 80–100)
MONOCYTES # BLD: 8 % (ref 1–7)
PDW BLD-RTO: 17.7 % (ref 11.5–14.9)
PLATELET # BLD: 256 K/UL (ref 150–450)
PMV BLD AUTO: 7.2 FL (ref 6–12)
POTASSIUM SERPL-SCNC: 3.8 MMOL/L (ref 3.7–5.3)
RBC # BLD: 2.72 M/UL (ref 4–5.2)
SEG NEUTROPHILS: 73 % (ref 36–66)
SEGMENTED NEUTROPHILS ABSOLUTE COUNT: 7.7 K/UL (ref 1.3–9.1)
SODIUM BLD-SCNC: 138 MMOL/L (ref 135–144)
WBC # BLD: 10.6 K/UL (ref 3.5–11)

## 2022-06-17 PROCEDURE — 2700000000 HC OXYGEN THERAPY PER DAY

## 2022-06-17 PROCEDURE — 6370000000 HC RX 637 (ALT 250 FOR IP): Performed by: INTERNAL MEDICINE

## 2022-06-17 PROCEDURE — 99233 SBSQ HOSP IP/OBS HIGH 50: CPT | Performed by: INTERNAL MEDICINE

## 2022-06-17 PROCEDURE — APPSS30 APP SPLIT SHARED TIME 16-30 MINUTES: Performed by: NURSE PRACTITIONER

## 2022-06-17 PROCEDURE — 99232 SBSQ HOSP IP/OBS MODERATE 35: CPT | Performed by: INTERNAL MEDICINE

## 2022-06-17 PROCEDURE — 36415 COLL VENOUS BLD VENIPUNCTURE: CPT

## 2022-06-17 PROCEDURE — 2580000003 HC RX 258: Performed by: INTERNAL MEDICINE

## 2022-06-17 PROCEDURE — 85025 COMPLETE CBC W/AUTO DIFF WBC: CPT

## 2022-06-17 PROCEDURE — 94640 AIRWAY INHALATION TREATMENT: CPT

## 2022-06-17 PROCEDURE — 80048 BASIC METABOLIC PNL TOTAL CA: CPT

## 2022-06-17 PROCEDURE — 93971 EXTREMITY STUDY: CPT

## 2022-06-17 PROCEDURE — 6360000002 HC RX W HCPCS: Performed by: INTERNAL MEDICINE

## 2022-06-17 PROCEDURE — 6370000000 HC RX 637 (ALT 250 FOR IP): Performed by: NURSE PRACTITIONER

## 2022-06-17 PROCEDURE — 85018 HEMOGLOBIN: CPT

## 2022-06-17 PROCEDURE — 94761 N-INVAS EAR/PLS OXIMETRY MLT: CPT

## 2022-06-17 PROCEDURE — 6370000000 HC RX 637 (ALT 250 FOR IP): Performed by: STUDENT IN AN ORGANIZED HEALTH CARE EDUCATION/TRAINING PROGRAM

## 2022-06-17 PROCEDURE — 2060000000 HC ICU INTERMEDIATE R&B

## 2022-06-17 PROCEDURE — 6360000002 HC RX W HCPCS: Performed by: STUDENT IN AN ORGANIZED HEALTH CARE EDUCATION/TRAINING PROGRAM

## 2022-06-17 PROCEDURE — 2580000003 HC RX 258: Performed by: STUDENT IN AN ORGANIZED HEALTH CARE EDUCATION/TRAINING PROGRAM

## 2022-06-17 PROCEDURE — 85014 HEMATOCRIT: CPT

## 2022-06-17 RX ORDER — PANTOPRAZOLE SODIUM 40 MG/1
40 TABLET, DELAYED RELEASE ORAL
Status: DISCONTINUED | OUTPATIENT
Start: 2022-06-17 | End: 2022-06-18 | Stop reason: HOSPADM

## 2022-06-17 RX ORDER — LINEZOLID 600 MG/1
600 TABLET, FILM COATED ORAL EVERY 12 HOURS SCHEDULED
Status: DISCONTINUED | OUTPATIENT
Start: 2022-06-17 | End: 2022-06-18 | Stop reason: HOSPADM

## 2022-06-17 RX ADMIN — ALBUTEROL SULFATE 2.5 MG: 2.5 SOLUTION RESPIRATORY (INHALATION) at 11:01

## 2022-06-17 RX ADMIN — ATORVASTATIN CALCIUM 20 MG: 20 TABLET, FILM COATED ORAL at 10:47

## 2022-06-17 RX ADMIN — ALBUTEROL SULFATE 2.5 MG: 2.5 SOLUTION RESPIRATORY (INHALATION) at 19:59

## 2022-06-17 RX ADMIN — SUCRALFATE 1 G: 1 TABLET ORAL at 17:20

## 2022-06-17 RX ADMIN — ALBUTEROL SULFATE 2.5 MG: 2.5 SOLUTION RESPIRATORY (INHALATION) at 07:14

## 2022-06-17 RX ADMIN — SODIUM CHLORIDE, PRESERVATIVE FREE 10 ML: 5 INJECTION INTRAVENOUS at 10:48

## 2022-06-17 RX ADMIN — SODIUM CHLORIDE: 9 INJECTION, SOLUTION INTRAVENOUS at 10:57

## 2022-06-17 RX ADMIN — TIOTROPIUM BROMIDE INHALATION SPRAY 2 PUFF: 3.12 SPRAY, METERED RESPIRATORY (INHALATION) at 07:21

## 2022-06-17 RX ADMIN — LINEZOLID 600 MG: 600 TABLET, FILM COATED ORAL at 10:48

## 2022-06-17 RX ADMIN — ALBUTEROL SULFATE 2.5 MG: 2.5 SOLUTION RESPIRATORY (INHALATION) at 15:11

## 2022-06-17 RX ADMIN — SUCRALFATE 1 G: 1 TABLET ORAL at 22:51

## 2022-06-17 RX ADMIN — LEVOTHYROXINE SODIUM 88 MCG: 0.09 TABLET ORAL at 06:10

## 2022-06-17 RX ADMIN — SUCRALFATE 1 G: 1 TABLET ORAL at 11:57

## 2022-06-17 RX ADMIN — LINEZOLID 600 MG: 600 TABLET, FILM COATED ORAL at 21:49

## 2022-06-17 RX ADMIN — IRON SUCROSE 200 MG: 20 INJECTION, SOLUTION INTRAVENOUS at 11:55

## 2022-06-17 RX ADMIN — SUCRALFATE 1 G: 1 TABLET ORAL at 00:50

## 2022-06-17 RX ADMIN — Medication 3 MG: at 21:51

## 2022-06-17 RX ADMIN — PANTOPRAZOLE SODIUM 40 MG: 40 TABLET, DELAYED RELEASE ORAL at 15:22

## 2022-06-17 RX ADMIN — SUCRALFATE 1 G: 1 TABLET ORAL at 06:10

## 2022-06-17 ASSESSMENT — ENCOUNTER SYMPTOMS
VOMITING: 0
ABDOMINAL DISTENTION: 0
SHORTNESS OF BREATH: 0
ANAL BLEEDING: 0
DIARRHEA: 0
NAUSEA: 0
CONSTIPATION: 0
COUGH: 0
ABDOMINAL PAIN: 0

## 2022-06-17 ASSESSMENT — PAIN DESCRIPTION - DESCRIPTORS: DESCRIPTORS: DISCOMFORT

## 2022-06-17 ASSESSMENT — PAIN SCALES - GENERAL
PAINLEVEL_OUTOF10: 4
PAINLEVEL_OUTOF10: 2
PAINLEVEL_OUTOF10: 9

## 2022-06-17 ASSESSMENT — PAIN DESCRIPTION - PAIN TYPE: TYPE: ACUTE PAIN

## 2022-06-17 ASSESSMENT — PAIN DESCRIPTION - ORIENTATION: ORIENTATION: RIGHT

## 2022-06-17 ASSESSMENT — PAIN DESCRIPTION - LOCATION
LOCATION: ARM

## 2022-06-17 ASSESSMENT — PAIN DESCRIPTION - FREQUENCY: FREQUENCY: INTERMITTENT

## 2022-06-17 NOTE — PROGRESS NOTES
Physical Therapy        Physical Therapy Cancel Note      DATE: 2022    NAME: Rachel Bridges  MRN: [id number]   : 1945      Patient not seen this date for Physical Therapy due to: Other: 1st attempt- Pt eating lunch, asks to return later. 2nd attempt-Pt refused, also RN working on getting new IV line.       Electronically signed by Per Tena PT on 2022 at 3:28 PM

## 2022-06-17 NOTE — PROGRESS NOTES
[address] [facility] [employer]    PROGRESS NOTE             6/17/2022    1:14 PM    Name:   Murray Crawford  MRN:     [id number]     Acct:      [de-identified]   Room:   2112/2112-01   Day:  5  Admit Date:  6/12/2022  1:35 PM    PCP:  Pham Raygoza MD  Code Status:  Full Code    Subjective:     C/C:   Chief Complaint   Patient presents with    Fatigue    Shortness of Breath     Interval History Status: improved. Patient was seen and examined at bedside. She is in no acute distress. Denies any nausea, vomiting, abdominal pain, hematemesis or melena. He had a bowel movement looks less darker than before. She tolerated regular diet. Brief History:         Review of Systems:     Review of Systems   Constitutional: Negative for fatigue and fever. Respiratory: Negative for cough and shortness of breath. Cardiovascular: Negative for chest pain, palpitations and leg swelling. Gastrointestinal: Negative for abdominal distention, abdominal pain, anal bleeding, constipation, diarrhea, nausea and vomiting. Genitourinary: Negative. Neurological: Negative for dizziness and headaches. Psychiatric/Behavioral: Negative for agitation and confusion. Medications:      Allergies:  No Known Allergies    Current Meds:   Scheduled Meds:    linezolid  600 mg Oral 2 times per day    pantoprazole  40 mg Oral BID AC    iron sucrose  200 mg IntraVENous Q24H    albuterol  2.5 mg Nebulization 4x daily    nicotine  1 patch TransDERmal Daily    sucralfate  1 g Oral 4 times per day    sodium chloride flush  5-40 mL IntraVENous 2 times per day    atorvastatin  20 mg Oral Daily    levothyroxine  88 mcg Oral Daily    tiotropium  2 puff Inhalation Daily     Continuous Infusions:    sodium chloride      sodium chloride 50 mL/hr at 06/17/22 1057     PRN Meds: potassium chloride **OR** potassium alternative oral replacement **OR** potassium chloride, magnesium sulfate, sodium phosphate IVPB **OR** sodium phosphate IVPB **OR** sodium phosphate IVPB, LORazepam, sodium chloride flush, sodium chloride, ondansetron **OR** ondansetron, polyethylene glycol, acetaminophen **OR** acetaminophen, albuterol, melatonin    Data:     Past Medical History:   has a past medical history of COPD (chronic obstructive pulmonary disease) ([address] Hyperlipidemia, Hypertension, Kidney stone, and Thyroid disease. Social History:   reports that she has been smoking cigarettes. She has never used smokeless tobacco. She reports current alcohol use of about 2.0 standard drinks of alcohol per week. She reports that she does not use drugs. Family History: History reviewed. No pertinent family history. Vitals:  /89   Pulse 78   Temp 98.3 °F (36.8 °C) (Oral)   Resp 18   Ht 5' 2\" (1.575 m)   Wt 137 lb 5.6 oz (62.3 kg)   SpO2 100%   BMI 25.12 kg/m²   Temp (24hrs), Av.3 °F (36.8 °C), Min:98 °F (36.7 °C), Max:98.5 °F (36.9 °C)    No results for input(s): POCGLU in the last 72 hours. I/O(24Hr): Intake/Output Summary (Last 24 hours) at 2022 1314  Last data filed at 2022 1219  Gross per 24 hour   Intake 720 ml   Output 350 ml   Net 370 ml       Labs:  [unfilled]    No results found for: SPECIAL  No results found for: CULTURE    [unfilled]    Radiology:    XR LUMBAR SPINE (MIN 4 VIEWS)    Result Date: 2022  EXAMINATION: 5 XRAY VIEWS OF THE LUMBAR SPINE 2022 6:50 pm COMPARISON: None. HISTORY: ORDERING SYSTEM PROVIDED HISTORY: backache TECHNOLOGIST PROVIDED HISTORY: backache Reason for Exam: Backache FINDINGS: The bones are mildly demineralized. No bony neural foraminal narrowing. There is prominent grade 1 anterolisthesis of L5 on S1. Moderate multilevel spondylotic changes. Mild left convex lumbar curvature. Mild superior endplate concavity at B17, T12 and L1, favored to be chronic. The sacroiliac joints appear intact.   Calcified atherosclerotic plaque in the abdominal aorta. 1. Prominent grade 1 anterolisthesis of L5 on S1. 2. Mild superior endplate concavity from T11-L1, favored to be chronic. If there is clinical concern for acute compression fracture, suggest correlation with CT or MRI of the lumbar spine. 3. Moderate lumbosacral spondylotic change. MRI LUMBAR SPINE WO CONTRAST    Result Date: 6/16/2022  EXAMINATION: MRI OF THE LUMBAR SPINE WITHOUT CONTRAST, 6/16/2022 1:21 pm TECHNIQUE: Multiplanar multisequence MRI of the lumbar spine was performed without the administration of intravenous contrast. COMPARISON: None. HISTORY: ORDERING SYSTEM PROVIDED HISTORY: acute low back pain intractable over 6 weeks TECHNOLOGIST PROVIDED HISTORY: acute low back pain intractable over 6 weeks Reason for Exam: acute low back pain intractable over 6 weeks Additional signs and symptoms: lower back pain, no fall, pt states she was moving furniture around her house by herself FINDINGS: BONES/ALIGNMENT: The vertebral body heights are maintained. There is age-appropriate bone marrow signal.  There is multilevel degenerative disc disease with loss of disc signal.  There is disc space narrowing most pronounced at L4-5. There is bilateral L5 pars defect with grade 1 anterolisthesis of L5 on S1. SPINAL CORD: The conus is normal in caliber and signal and terminates at L2. The cauda equina is unremarkable. SOFT TISSUES: Posterior paraspinal soft tissues are unremarkable. There is right-sided hydronephrosis. L1-L2: There is a circumferential disc bulge. There is no canal stenosis or left foraminal narrowing. There is mild right foraminal narrowing. L2-L3: There is a circumferential disc bulge with facet and ligamentous hypertrophy. There is no canal stenosis or left foraminal narrowing. There is mild right foraminal narrowing. L3-L4: There is a circumferential disc bulge with facet and ligamentous hypertrophy. There is canal stenosis measuring 7 mm in AP dimension.   There is mild bilateral foraminal narrowing. L4-L5: There is a circumferential disc bulge with facet hypertrophy. There is no canal stenosis. There is moderate right and moderate to severe left foraminal narrowing. L5-S1: There is a circumferential disc bulge with uncovering of the disc space posteriorly. There is facet hypertrophy. There is no canal stenosis. There is moderate to severe bilateral foraminal narrowing. Multilevel degenerative disc disease with associated facet and ligamentous hypertrophy resulting in canal stenosis at L3-4. Bilateral L5 pars defect with grade 1 anterolisthesis of L5 on S1 resulting in moderate to severe bilateral foraminal narrowing. Foraminal narrowing at the remaining levels as described above. RECOMMENDATIONS: Unavailable     US RENAL COMPLETE    Result Date: 6/14/2022  EXAMINATION: RETROPERITONEAL ULTRASOUND OF THE KIDNEYS AND URINARY BLADDER 6/14/2022 COMPARISON: None HISTORY: ORDERING SYSTEM PROVIDED HISTORY: Hydronephrosis versus renal cysts on bedside ultrasound TECHNOLOGIST PROVIDED HISTORY: Hydronephrosis versus renal cysts on bedside ultrasound FINDINGS: Kidneys: The right kidney measures 1329 cm in length and the left kidney measures 11.9 cm in length. Severe right-sided hydronephrosis without source of obstruction demonstrated. No left-sided hydronephrosis. No nephrolithiasis or soft tissue mass. Severe right-sided hydronephrosis without source of obstruction demonstrated. Unremarkable ultrasound left kidney. XR CHEST PORTABLE    Result Date: 6/12/2022  EXAMINATION: ONE XRAY VIEW OF THE CHEST 6/12/2022 2:00 pm COMPARISON: None. HISTORY: ORDERING SYSTEM PROVIDED HISTORY: shortenss of breath, hx copd TECHNOLOGIST PROVIDED HISTORY: shortenss of breath, hx copd FINDINGS: Lungs are hyperinflated suggesting COPD. No focal consolidation. No pneumothorax or pleural effusion. Cardiac and mediastinal silhouettes unremarkable. No acute osseous abnormality. Generalized osteopenia. Telemetry leads overlie the chest.     COPD. No acute findings. Physical Examination:        Physical Exam  Constitutional:       General: She is not in acute distress. HENT:      Head: Normocephalic and atraumatic. Cardiovascular:      Rate and Rhythm: Normal rate and regular rhythm. Pulses: Normal pulses. Heart sounds: Normal heart sounds. No murmur heard. Pulmonary:      Effort: Pulmonary effort is normal.      Breath sounds: Normal breath sounds. No wheezing, rhonchi or rales. Abdominal:      General: Abdomen is flat. Bowel sounds are normal. There is no distension. Palpations: Abdomen is soft. Tenderness: There is no abdominal tenderness. Musculoskeletal:      Right lower leg: No edema. Left lower leg: No edema. Neurological:      General: No focal deficit present. Mental Status: She is alert and oriented to person, place, and time. Psychiatric:         Mood and Affect: Mood normal.           Assessment:        Primary Problem  Anemia    Active Hospital Problems    Diagnosis Date Noted    Acute midline low back pain without sciatica [M54.50] 06/15/2022     Priority: Medium    Upper GI bleeding [K92.2] 06/13/2022     Priority: Medium    Duodenal ulcer [K26.9] 06/13/2022     Priority: Medium    Anemia [D64.9] 06/12/2022     Priority: Medium    Leukocytosis (leucocytosis) [D72.829] 06/12/2022     Priority: Medium    Hypothyroidism [E03.9] 06/12/2022     Priority: Medium    COPD (chronic obstructive pulmonary disease) ([facility] [address] [J44.9] 06/12/2022     Priority: Medium       Plan:        Anemia 2/2 acute blood loss from duodenal ulcer 2/2 NSAID use  -Hemoglobin is 4.4->8.3> 8.6  -Patient received 4 unit of PRBC   -H&H Q12  -GI consulted              - EGD showed large duodenal ulcer  -Regular diet   -Protonix gtt.   -Carafate   -General surgery consulted               - On standby if needed     Hypokalemia- resolved   - K 3.8  - Replacement protocol     Leukocytosis, resolved  -WBC 21>9.9>10.6  -Normal lactate  -Negative chest x-ray  -negative urinalysis, CRP wnl       Hypothyroidism   -TSH is 11.5  -Continue home Synthroid 88 mcg Daily      Hypertension  -hold home medications due to low blood pressure     Diet: regular diet  DVT prophylaxis: Pneumatic compression  GI prophylaxis: Protonix gtt    Sherin Wiggins MD  6/17/2022  1:14 PM     Attending Physician Statement    I have discussed the case of Kim Almendarez, including pertinent history and exam findings with the resident. I have seen and examined the patient and the key elements of the encounter have been performed by me. I agree with the assessment, plan, and orders as documented by the resident.   She is asymptomatic her hemoglobin has come up to 8.6, there is no evidence of active bleeding and she can be discharged  Electronically signed by Sherin Wiggins MD on 6/17/2022 at 1:14 PM

## 2022-06-17 NOTE — PROGRESS NOTES
Patient seen and examined. Afebrile, VSS. No leukocytosis, hemoglobin has been stable. Hgb 8.6 this morning. Patient is doing well, only c/o RUE pain. She does have some erythema and warmth over antecubital area. RUE Dopplers pending. Patient denies abdominal pain. No BM overnight. Tolerating regular diet, no N/V. Abdomen soft, non-tender, non-distended. No new general surgery issues. Monitor H&H [id number]. PPI and Carafate. Diet as tolerated. Surgery on standby. Continue medical management. Patient was seen and examined. Doing well. No acute general surgical issues. Continue diet as tolerated. Proton pump inhibitors and Carafate. Conservative management. Patient and the family updated.     Amanda Nowak PA-C  [address] [facility], [place], operated by [facility]

## 2022-06-17 NOTE — PROGRESS NOTES
BRONCHOSPASM/BRONCHOCONSTRICTION     [x]         IMPROVE AERATION/BREATH SOUNDS  [x]   ADMINISTER BRONCHODILATOR THERAPY AS APPROPRIATE  [x]   ASSESS BREATH SOUNDS  []   IMPLEMENT AEROSOL/MDI PROTOCOL  [x]   PATIENT EDUCATION AS NEEDED    PROVIDE ADEQUATE OXYGENATION WITH ACCEPTABLE SP02/ABG'S    [x]  IDENTIFY APPROPRIATE OXYGEN THERAPY  [x]   MONITOR SP02/ABG'S AS NEEDED   [x]   PATIENT EDUCATION AS NEEDED    Stable on 3L, tolerates breathing txs.

## 2022-06-17 NOTE — PROGRESS NOTES
Surgical Progress Note    POD:      Patient doing fairly well  Vitals:    22 1215   BP: 122/89   Pulse: 78   Resp: 18   Temp: 98.3 °F (36.8 °C)   SpO2: 100%      Temp (24hrs), Av.3 °F (36.8 °C), Min:98 °F (36.7 °C), Max:98.5 °F (36.9 °C)       Pain Control fair    No unusual nausea    Exam: no change      MRI LUMBAR spondylolytic spondylolisthesis L5-S1 multi-level moderated stenosis & spondylosis        Lungs:  No respiratory distress    Labs reviewed:  Labs:  WBC/Hgb/Hct/Plts:  10.6/8.6/25.7/256 ( 6066)  BUN/Cr/glu/ALT/AST/amyl/lip:  6/0.61/--/--/--/--/-- ( 0110)     Na/K/Cl/CO2:  138/3.8/108/21 ( 5642)    I/O last 3 completed shifts: In: 240 [P.O.:240]  Out: 750 [Urine:750]    Assessment:    Patient Active Problem List   Diagnosis    Anemia    Leukocytosis (leucocytosis)    Hypothyroidism    COPD (chronic obstructive pulmonary disease) ([facility] [address]    Upper GI bleeding    Duodenal ulcer    Acute midline low back pain without sciatica    Spondylolysis with spondylolisthesis    Spinal stenosis, lumbar region, without neurogenic claudication       Plan:   Activity ad roro  Follow up outpatient 2 weeks    Fuentes Manzanares MD MD  2022 1:47 PM

## 2022-06-17 NOTE — PROGRESS NOTES
GI Progress notes    6/17/2022   1:08 PM    Name:  Radha Gomez  MRN:    [id number]     Acct:     [de-identified]   Room:  2112/2112-01   Day: 5     Admit Date: 6/12/2022  1:35 PM  PCP: Brigida Darden MD    Subjective:     C/C:   Chief Complaint   Patient presents with    Fatigue    Shortness of Breath       Interval History: Status: improved. Patient seen and examined. No acute events overnight. Reports brown BM this morning  Hgb stabl  No abdominal pain  Tolerating diet well    ROS:  Constitutional: negative for chills, fevers and sweats  Gastrointestinal: negative for abdominal pain, constipation, diarrhea, nausea and vomiting  Neurological: negative for dizziness and headaches    Medications:      Allergies: No Known Allergies    Current Meds: pantoprazole (PROTONIX) 80 mg in sodium chloride 0.9 % 100 mL infusion, Continuous  linezolid (ZYVOX) tablet 600 mg, 2 times per day  iron sucrose (VENOFER) 200 mg in sodium chloride 0.9 % 100 mL IVPB, Q24H  potassium chloride (KLOR-CON M) extended release tablet 40 mEq, PRN   Or  potassium bicarb-citric acid (EFFER-K) effervescent tablet 40 mEq, PRN   Or  potassium chloride 10 mEq/100 mL IVPB (Peripheral Line), PRN  magnesium sulfate 2000 mg in water 50 mL IVPB, PRN  sodium phosphate 10 mmol in sodium chloride 0.9 % 250 mL IVPB, PRN   Or  sodium phosphate 15 mmol in sodium chloride 0.9 % 250 mL IVPB, PRN   Or  sodium phosphate 20 mmol in sodium chloride 0.9 % 500 mL IVPB, PRN  albuterol (PROVENTIL) nebulizer solution 2.5 mg, 4x daily  LORazepam (ATIVAN) injection 0.5 mg, Q6H PRN  nicotine (NICODERM CQ) 21 MG/24HR 1 patch, Daily  sucralfate (CARAFATE) tablet 1 g, 4 times per day  sodium chloride flush 0.9 % injection 5-40 mL, 2 times per day  sodium chloride flush 0.9 % injection 5-40 mL, PRN  0.9 % sodium chloride infusion, PRN  ondansetron (ZOFRAN-ODT) disintegrating tablet 4 mg, Q8H PRN   Or  ondansetron (ZOFRAN) injection 4 mg, Q6H PRN  polyethylene glycol (GLYCOLAX) packet 17 g, Daily PRN  acetaminophen (TYLENOL) tablet 650 mg, Q6H PRN   Or  acetaminophen (TYLENOL) suppository 650 mg, Q6H PRN  albuterol (PROVENTIL) nebulizer solution 2.5 mg, Q6H PRN  atorvastatin (LIPITOR) tablet 20 mg, Daily  levothyroxine (SYNTHROID) tablet 88 mcg, Daily  tiotropium (SPIRIVA RESPIMAT) 2.5 MCG/ACT inhaler 2 puff, Daily  0.9 % sodium chloride infusion, Continuous  melatonin tablet 3 mg, Nightly PRN        Data:     Code Status:  Full Code    History reviewed. No pertinent family history. Social History     Socioeconomic History    Marital status: Single     Spouse name: Not on file    Number of children: Not on file    Years of education: Not on file    Highest education level: Not on file   Occupational History    Not on file   Tobacco Use    Smoking status: Current Every Day Smoker     Types: Cigarettes    Smokeless tobacco: Never Used   Substance and Sexual Activity    Alcohol use: Yes     Alcohol/week: 2.0 standard drinks     Types: 2 Glasses of wine per week    Drug use: Never    Sexual activity: Not on file   Other Topics Concern    Not on file   Social History Narrative    Not on file     Social Determinants of Health     Financial Resource Strain:     Difficulty of Paying Living Expenses: Not on file   Food Insecurity:     Worried About Running Out of Food in the Last Year: Not on file    Trung of Food in the Last Year: Not on file   Transportation Needs:     Lack of Transportation (Medical): Not on file    Lack of Transportation (Non-Medical):  Not on file   Physical Activity:     Days of Exercise per Week: Not on file    Minutes of Exercise per Session: Not on file   Stress:     Feeling of Stress : Not on file   Social Connections:     Frequency of Communication with Friends and Family: Not on file    Frequency of Social Gatherings with Friends and Family: Not on file    Attends Yarsanism Services: Not on file   [employer] of [employer]s or Organizations: Not on file    Attends Club or Organization Meetings: Not on file    Marital Status: Not on file   Intimate Partner Violence:     Fear of Current or Ex-Partner: Not on file    Emotionally Abused: Not on file    Physically Abused: Not on file    Sexually Abused: Not on file   Housing Stability:     Unable to Pay for Housing in the Last Year: Not on file    Number of Krzysztof in the Last Year: Not on file    Unstable Housing in the Last Year: Not on file       Vitals:  /89   Pulse 78   Temp 98.3 °F (36.8 °C) (Oral)   Resp 18   Ht 5' 2\" (1.575 m)   Wt 137 lb 5.6 oz (62.3 kg)   SpO2 100%   BMI 25.12 kg/m²   Temp (24hrs), Av.3 °F (36.8 °C), Min:98 °F (36.7 °C), Max:98.5 °F (36.9 °C)    No results for input(s): POCGLU in the last 72 hours. I/O (24Hr):     Intake/Output Summary (Last 24 hours) at 2022 1308  Last data filed at 2022 1219  Gross per 24 hour   Intake 720 ml   Output 350 ml   Net 370 ml       Labs:      CBC:   Lab Results   Component Value Date    WBC 10.6 2022    RBC 2.72 2022    HGB 8.6 2022    HCT 25.7 2022    MCV 94.4 2022    MCH 31.7 2022    MCHC 33.6 2022    RDW 17.7 2022     2022    MPV 7.2 2022     CBC with Differential:    Lab Results   Component Value Date    WBC 10.6 2022    RBC 2.72 2022    HGB 8.6 2022    HCT 25.7 2022     2022    MCV 94.4 2022    MCH 31.7 2022    MCHC 33.6 2022    RDW 17.7 2022    LYMPHOPCT 14 2022    MONOPCT 8 2022    BASOPCT 1 2022    MONOSABS 0.90 2022    LYMPHSABS 1.50 2022    EOSABS 0.40 2022    BASOSABS 0.10 2022     Hemoglobin/Hematocrit:    Lab Results   Component Value Date    HGB 8.6 2022    HCT 25.7 2022     CMP:    Lab Results   Component Value Date     2022    K 3.8 2022     2022    CO2 21 2022 BUN 6 06/17/2022    CREATININE 0.61 06/17/2022    GFRAA >60 06/17/2022    LABGLOM >60 06/17/2022    GLUCOSE 102 06/17/2022    PROT 6.1 06/12/2022    LABALBU 3.7 06/12/2022    CALCIUM 8.1 06/17/2022    BILITOT <0.15 06/12/2022    ALKPHOS 76 06/12/2022    AST 15 06/12/2022    ALT 21 06/12/2022     BMP:    Lab Results   Component Value Date     06/17/2022    K 3.8 06/17/2022     06/17/2022    CO2 21 06/17/2022    BUN 6 06/17/2022    LABALBU 3.7 06/12/2022    CREATININE 0.61 06/17/2022    CALCIUM 8.1 06/17/2022    GFRAA >60 06/17/2022    LABGLOM >60 06/17/2022    GLUCOSE 102 06/17/2022     PT/INR:    Lab Results   Component Value Date    PROTIME 14.2 06/12/2022    INR 1.1 06/12/2022     PTT:    Lab Results   Component Value Date    APTT 36.0 06/12/2022   [APTT}    Physical Examination:        General appearance: alert, cooperative and no distress  Mental Status: oriented to person, place and time and normal affect,  Abdomen: soft, nontender, nondistended, bowel sounds present   Extremities: no edema, redness or tenderness in the calves  Skin: no gross lesions, rashes, or induration    Assessment:        Primary Problem  Anemia     Active Hospital Problems    Diagnosis Date Noted    Acute midline low back pain without sciatica [M54.50] 06/15/2022     Priority: Medium    Upper GI bleeding [K92.2] 06/13/2022     Priority: Medium    Duodenal ulcer [K26.9] 06/13/2022     Priority: Medium    Anemia [D64.9] 06/12/2022     Priority: Medium    Leukocytosis (leucocytosis) [D72.829] 06/12/2022     Priority: Medium    Hypothyroidism [E03.9] 06/12/2022     Priority: Medium    COPD [facility]) [facility] [J44.9] 06/12/2022     Priority: Medium     Past Medical History:   Diagnosis Date    COPD (chronic obstructive pulmonary disease) ([facility] [address]     Hyperlipidemia     Hypertension     Kidney stone     Thyroid disease         Plan:        1.  Epigastric pain, excessive NSAID use, melena s/p EGD revealing acute duodenal ulcer occupying entire apex of duodenal bulb  1. Brown BM this am  2. Hgb stable  3. Change PPI to PO BID  4. Carafate x 1 week  5. May have soft diet  6. May d/c per GI  7. Repeat CBC next week         Explained to the patient and d/W Nursing Staff  Will F/U with you  Please call or Page for any issues or change in status  Thanks    Electronically signed by KAM Morales NP on 6/17/2022 at 1:08 PM     GI attending physician note. Patient seen with KAM     I independently performed an evaluation on [facility]. I have reviewed the above documentation completed by the Nurse Practitioner and agree with the history, examination, and management plan. Recommendations discussed. Patient is stable. No bowel movements in the last 24 hours. Tolerating diet well. Hemoglobin stable. To switch to oral PPI therapy. From GI patient may be followed as an outpatient. To repeat hemoglobin in the next couple of days. To see in the office in the next 10 days.

## 2022-06-17 NOTE — PLAN OF CARE
Problem: Pain  Goal: Verbalizes/displays adequate comfort level or baseline comfort level  6/17/2022 1801 by Lilian Lopez RN  Outcome: Progressing  Flowsheets (Taken 6/17/2022 1801)  Verbalizes/displays adequate comfort level or baseline comfort level:   Assess pain using appropriate pain scale   Implement non-pharmacological measures as appropriate and evaluate response   Encourage patient to monitor pain and request assistance  6/17/2022 1759 by Lilian Lopez RN  Outcome: Progressing      Problem: Safety - Adult  Goal: Free from fall injury  6/17/2022 1801 by Lilian Lopez RN  Outcome: Progressing  Flowsheets (Taken 6/17/2022 1801)  Free From Fall Injury: Instruct family/caregiver on patient safety  6/17/2022 1801 by Lilian Lopez RN

## 2022-06-17 NOTE — CARE COORDINATION
ONGOING DISCHARGE PLAN:    Patient is alert and oriented x4. Spoke with patient regarding discharge plan and patient confirms that plan is still to return home with no needs. POD#4 EGD for duodenal ulcer HGB 8.6 today. Protonix gtt, GI following. PO zyvox, IV venofer, soft diet. Pt has right arm swelling in antecub area, awaiting venous doppler result. Will continue to follow for additional discharge needs.     Electronically signed by Jason Ayoub RN on 6/17/2022 at 12:05 PM

## 2022-06-18 VITALS
HEART RATE: 86 BPM | BODY MASS INDEX: 26.98 KG/M2 | OXYGEN SATURATION: 96 % | TEMPERATURE: 97.4 F | HEIGHT: 62 IN | RESPIRATION RATE: 16 BRPM | DIASTOLIC BLOOD PRESSURE: 65 MMHG | SYSTOLIC BLOOD PRESSURE: 146 MMHG | WEIGHT: 146.61 LBS

## 2022-06-18 PROBLEM — E03.9 HYPOTHYROIDISM: Chronic | Status: ACTIVE | Noted: 2022-06-12

## 2022-06-18 PROBLEM — M43.00 SPONDYLOLYSIS WITH SPONDYLOLISTHESIS: Chronic | Status: ACTIVE | Noted: 2022-06-17

## 2022-06-18 PROBLEM — M54.50 ACUTE MIDLINE LOW BACK PAIN WITHOUT SCIATICA: Chronic | Status: ACTIVE | Noted: 2022-06-15

## 2022-06-18 PROBLEM — M48.061 SPINAL STENOSIS, LUMBAR REGION, WITHOUT NEUROGENIC CLAUDICATION: Chronic | Status: ACTIVE | Noted: 2022-06-17

## 2022-06-18 PROBLEM — E78.5 HYPERLIPIDEMIA: Status: ACTIVE | Noted: 2022-05-16

## 2022-06-18 PROBLEM — J44.9 COPD (CHRONIC OBSTRUCTIVE PULMONARY DISEASE) (HCC): Chronic | Status: ACTIVE | Noted: 2022-06-12

## 2022-06-18 PROBLEM — M43.10 SPONDYLOLYSIS WITH SPONDYLOLISTHESIS: Chronic | Status: ACTIVE | Noted: 2022-06-17

## 2022-06-18 PROBLEM — Z72.0 TOBACCO ABUSE: Chronic | Status: ACTIVE | Noted: 2022-06-18

## 2022-06-18 LAB
ABSOLUTE EOS #: 0.44 K/UL (ref 0–0.4)
ABSOLUTE LYMPH #: 1.23 K/UL (ref 1–4.8)
ABSOLUTE MONO #: 0.79 K/UL (ref 0.1–1.3)
ANION GAP SERPL CALCULATED.3IONS-SCNC: 8 MMOL/L (ref 9–17)
BASOPHILS # BLD: 1 % (ref 0–2)
BASOPHILS ABSOLUTE: 0.09 K/UL (ref 0–0.2)
BUN BLDV-MCNC: 6 MG/DL (ref 8–23)
CALCIUM SERPL-MCNC: 7.8 MG/DL (ref 8.6–10.4)
CHLORIDE BLD-SCNC: 105 MMOL/L (ref 98–107)
CO2: 22 MMOL/L (ref 20–31)
CREAT SERPL-MCNC: 0.49 MG/DL (ref 0.5–0.9)
EOSINOPHILS RELATIVE PERCENT: 5 % (ref 0–4)
GFR AFRICAN AMERICAN: >60 ML/MIN
GFR NON-AFRICAN AMERICAN: >60 ML/MIN
GFR SERPL CREATININE-BSD FRML MDRD: ABNORMAL ML/MIN/{1.73_M2}
GLUCOSE BLD-MCNC: 104 MG/DL (ref 70–99)
HCT VFR BLD CALC: 27.6 % (ref 36–46)
HEMOGLOBIN: 9.1 G/DL (ref 12–16)
LYMPHOCYTES # BLD: 14 % (ref 24–44)
MCH RBC QN AUTO: 31.9 PG (ref 26–34)
MCHC RBC AUTO-ENTMCNC: 33.1 G/DL (ref 31–37)
MCV RBC AUTO: 96.3 FL (ref 80–100)
MONOCYTES # BLD: 9 % (ref 1–7)
MORPHOLOGY: ABNORMAL
PDW BLD-RTO: 21.3 % (ref 11.5–14.9)
PLATELET # BLD: 265 K/UL (ref 150–450)
PMV BLD AUTO: 7.3 FL (ref 6–12)
POTASSIUM SERPL-SCNC: 3.6 MMOL/L (ref 3.7–5.3)
RBC # BLD: 2.86 M/UL (ref 4–5.2)
SEG NEUTROPHILS: 71 % (ref 36–66)
SEGMENTED NEUTROPHILS ABSOLUTE COUNT: 6.25 K/UL (ref 1.3–9.1)
SODIUM BLD-SCNC: 135 MMOL/L (ref 135–144)
WBC # BLD: 8.8 K/UL (ref 3.5–11)

## 2022-06-18 PROCEDURE — 6360000002 HC RX W HCPCS: Performed by: INTERNAL MEDICINE

## 2022-06-18 PROCEDURE — 6370000000 HC RX 637 (ALT 250 FOR IP): Performed by: INTERNAL MEDICINE

## 2022-06-18 PROCEDURE — 85025 COMPLETE CBC W/AUTO DIFF WBC: CPT

## 2022-06-18 PROCEDURE — 6370000000 HC RX 637 (ALT 250 FOR IP)

## 2022-06-18 PROCEDURE — 6370000000 HC RX 637 (ALT 250 FOR IP): Performed by: STUDENT IN AN ORGANIZED HEALTH CARE EDUCATION/TRAINING PROGRAM

## 2022-06-18 PROCEDURE — 99233 SBSQ HOSP IP/OBS HIGH 50: CPT | Performed by: INTERNAL MEDICINE

## 2022-06-18 PROCEDURE — 80048 BASIC METABOLIC PNL TOTAL CA: CPT

## 2022-06-18 PROCEDURE — 94640 AIRWAY INHALATION TREATMENT: CPT

## 2022-06-18 PROCEDURE — 6370000000 HC RX 637 (ALT 250 FOR IP): Performed by: NURSE PRACTITIONER

## 2022-06-18 PROCEDURE — 6360000002 HC RX W HCPCS

## 2022-06-18 PROCEDURE — 94761 N-INVAS EAR/PLS OXIMETRY MLT: CPT

## 2022-06-18 PROCEDURE — 2580000003 HC RX 258: Performed by: INTERNAL MEDICINE

## 2022-06-18 PROCEDURE — 36415 COLL VENOUS BLD VENIPUNCTURE: CPT

## 2022-06-18 RX ORDER — ALBUTEROL SULFATE 2.5 MG/3ML
2.5 SOLUTION RESPIRATORY (INHALATION) EVERY 6 HOURS PRN
Qty: 120 EACH | Refills: 3 | Status: SHIPPED | OUTPATIENT
Start: 2022-06-18 | End: 2022-06-18 | Stop reason: SDUPTHER

## 2022-06-18 RX ORDER — LISINOPRIL 10 MG/1
10 TABLET ORAL DAILY
Status: DISCONTINUED | OUTPATIENT
Start: 2022-06-18 | End: 2022-06-18 | Stop reason: HOSPADM

## 2022-06-18 RX ORDER — PANTOPRAZOLE SODIUM 40 MG/1
40 TABLET, DELAYED RELEASE ORAL
Qty: 30 TABLET | Refills: 1 | Status: SHIPPED | OUTPATIENT
Start: 2022-06-18 | End: 2022-06-18

## 2022-06-18 RX ORDER — ALBUTEROL SULFATE 2.5 MG/3ML
2.5 SOLUTION RESPIRATORY (INHALATION) EVERY 6 HOURS PRN
Qty: 120 EACH | Refills: 3 | Status: SHIPPED | OUTPATIENT
Start: 2022-06-18 | End: 2022-07-26 | Stop reason: SDUPTHER

## 2022-06-18 RX ORDER — POTASSIUM CHLORIDE 20 MEQ/1
40 TABLET, EXTENDED RELEASE ORAL ONCE
Status: COMPLETED | OUTPATIENT
Start: 2022-06-18 | End: 2022-06-18

## 2022-06-18 RX ORDER — AMLODIPINE BESYLATE 5 MG/1
5 TABLET ORAL DAILY
Status: DISCONTINUED | OUTPATIENT
Start: 2022-06-18 | End: 2022-06-18 | Stop reason: HOSPADM

## 2022-06-18 RX ORDER — SUCRALFATE 1 G/1
1 TABLET ORAL 3 TIMES DAILY
Qty: 15 TABLET | Refills: 0 | Status: SHIPPED | OUTPATIENT
Start: 2022-06-18 | End: 2022-06-22 | Stop reason: SDUPTHER

## 2022-06-18 RX ORDER — SUCRALFATE 1 G/1
1 TABLET ORAL 3 TIMES DAILY
Qty: 15 TABLET | Refills: 0 | Status: SHIPPED | OUTPATIENT
Start: 2022-06-18 | End: 2022-06-18

## 2022-06-18 RX ORDER — PANTOPRAZOLE SODIUM 40 MG/1
40 TABLET, DELAYED RELEASE ORAL
Qty: 30 TABLET | Refills: 1 | Status: SHIPPED | OUTPATIENT
Start: 2022-06-18 | End: 2022-06-22 | Stop reason: SDUPTHER

## 2022-06-18 RX ADMIN — LISINOPRIL 10 MG: 10 TABLET ORAL at 08:51

## 2022-06-18 RX ADMIN — LORAZEPAM 0.5 MG: 2 INJECTION INTRAMUSCULAR; INTRAVENOUS at 00:43

## 2022-06-18 RX ADMIN — AMLODIPINE BESYLATE 5 MG: 5 TABLET ORAL at 08:50

## 2022-06-18 RX ADMIN — ALBUTEROL SULFATE 2.5 MG: 2.5 SOLUTION RESPIRATORY (INHALATION) at 11:17

## 2022-06-18 RX ADMIN — TIOTROPIUM BROMIDE INHALATION SPRAY 2 PUFF: 3.12 SPRAY, METERED RESPIRATORY (INHALATION) at 07:11

## 2022-06-18 RX ADMIN — SUCRALFATE 1 G: 1 TABLET ORAL at 05:26

## 2022-06-18 RX ADMIN — POTASSIUM CHLORIDE 40 MEQ: 20 TABLET, EXTENDED RELEASE ORAL at 08:50

## 2022-06-18 RX ADMIN — SODIUM CHLORIDE, PRESERVATIVE FREE 10 ML: 5 INJECTION INTRAVENOUS at 08:42

## 2022-06-18 RX ADMIN — SUCRALFATE 1 G: 1 TABLET ORAL at 11:53

## 2022-06-18 RX ADMIN — LEVOTHYROXINE SODIUM 88 MCG: 0.09 TABLET ORAL at 05:26

## 2022-06-18 RX ADMIN — LINEZOLID 600 MG: 600 TABLET, FILM COATED ORAL at 08:42

## 2022-06-18 RX ADMIN — ATORVASTATIN CALCIUM 20 MG: 20 TABLET, FILM COATED ORAL at 08:42

## 2022-06-18 RX ADMIN — ALBUTEROL SULFATE 2.5 MG: 2.5 SOLUTION RESPIRATORY (INHALATION) at 07:08

## 2022-06-18 RX ADMIN — PANTOPRAZOLE SODIUM 40 MG: 40 TABLET, DELAYED RELEASE ORAL at 05:26

## 2022-06-18 ASSESSMENT — ENCOUNTER SYMPTOMS
SHORTNESS OF BREATH: 0
NAUSEA: 0
DIARRHEA: 0
ABDOMINAL PAIN: 0
CONSTIPATION: 0
COUGH: 0
BACK PAIN: 0
VOMITING: 0

## 2022-06-18 ASSESSMENT — PAIN SCALES - GENERAL: PAINLEVEL_OUTOF10: 0

## 2022-06-18 NOTE — PLAN OF CARE
Problem: Discharge Planning  Goal: Discharge to home or other facility with appropriate resources  Outcome: Progressing     Problem: Pain  Goal: Verbalizes/displays adequate comfort level or baseline comfort level  6/18/2022 0448 by Adis Terry RN  Outcome: Progressing     Problem: Safety - Adult  Goal: Free from fall injury  6/18/2022 0448 by Adis Terry RN  Outcome: Progressing     Problem: ABCDS Injury Assessment  Goal: Absence of physical injury  Outcome: Progressing

## 2022-06-18 NOTE — PROGRESS NOTES
Physical Therapy        Physical Therapy Cancel Note      DATE: 2022    NAME: Piedad Llamas  MRN: [id number]   : 1945      Patient not seen this date for Physical Therapy due to:    Pt refused , educated benefits of skilled PT services in preparation to return to home environment today, pt voices understanding and no concerns, nursing notified       Electronically signed by Nika Farfan PTA on 2022 at 10:54 AM

## 2022-06-18 NOTE — PROGRESS NOTES
[address] [facility].    PROGRESS NOTE             6/18/2022    8:15 AM    Name:   Radha Gomez  MRN:     [id number]     Acct:      [de-identified]   Room:   2112/2112-01  IP Day:  6  Admit Date:  6/12/2022  1:35 PM    PCP:  Brigida Darden MD  Code Status:  Full Code    Subjective:     C/C:   Chief Complaint   Patient presents with    Fatigue    Shortness of Breath     Interval History Status: improved. Patient was seen and examined at bedside. She is in no acute distress. She is feeling better today. Denies any nausea, vomiting, hematemesis or melena. She had bowel movement this morning, she states more brownish today. She is complaining of right arm pain, ultrasound Doppler showed superficial venous thrombosis. Review of Systems:     Review of Systems   Constitutional: Negative for fatigue and fever. Respiratory: Negative for cough and shortness of breath. Cardiovascular: Negative for chest pain, palpitations and leg swelling. Gastrointestinal: Negative for abdominal pain, constipation, diarrhea, nausea and vomiting. Genitourinary: Negative. Musculoskeletal: Negative for back pain and neck pain. Skin:        Redness over right arm    Neurological: Negative for dizziness, weakness, numbness and headaches. Psychiatric/Behavioral: Negative for agitation and confusion. Medications:      Allergies:  No Known Allergies    Current Meds:   Scheduled Meds:    linezolid  600 mg Oral 2 times per day    pantoprazole  40 mg Oral BID AC    iron sucrose  200 mg IntraVENous Q24H    albuterol  2.5 mg Nebulization 4x daily    nicotine  1 patch TransDERmal Daily    sucralfate  1 g Oral 4 times per day    sodium chloride flush  5-40 mL IntraVENous 2 times per day    atorvastatin  20 mg Oral Daily    levothyroxine  88 mcg Oral Daily    tiotropium  2 puff Inhalation Daily     Continuous Infusions:    sodium chloride      sodium chloride 50 mL/hr at 22 1057     PRN Meds: potassium chloride **OR** potassium alternative oral replacement **OR** potassium chloride, magnesium sulfate, sodium phosphate IVPB **OR** sodium phosphate IVPB **OR** sodium phosphate IVPB, LORazepam, sodium chloride flush, sodium chloride, ondansetron **OR** ondansetron, polyethylene glycol, acetaminophen **OR** acetaminophen, albuterol, melatonin    Data:     Past Medical History:   has a past medical history of COPD (chronic obstructive pulmonary disease) ([address] Hyperlipidemia, Hypertension, Kidney stone, and Thyroid disease. Social History:   reports that she has been smoking cigarettes. She has never used smokeless tobacco. She reports current alcohol use of about 2.0 standard drinks of alcohol per week. She reports that she does not use drugs. Family History: History reviewed. No pertinent family history. Vitals:  BP (!) 159/75   Pulse 78   Temp 97.6 °F (36.4 °C) (Oral)   Resp 16   Ht 5' 2\" (1.575 m)   Wt 146 lb 9.7 oz (66.5 kg)   SpO2 94%   BMI 26.81 kg/m²   Temp (24hrs), Av °F (36.7 °C), Min:97.6 °F (36.4 °C), Max:98.3 °F (36.8 °C)    No results for input(s): POCGLU in the last 72 hours. I/O(24Hr): Intake/Output Summary (Last 24 hours) at 2022 0815  Last data filed at 2022 1854  Gross per 24 hour   Intake 830 ml   Output --   Net 830 ml       Labs:  [unfilled]    No results found for: SPECIAL  No results found for: CULTURE    [unfilled]    Radiology:    XR LUMBAR SPINE (MIN 4 VIEWS)    Result Date: 2022  EXAMINATION: 5 XRAY VIEWS OF THE LUMBAR SPINE 2022 6:50 pm COMPARISON: None. HISTORY: ORDERING SYSTEM PROVIDED HISTORY: backache TECHNOLOGIST PROVIDED HISTORY: backache Reason for Exam: Backache FINDINGS: The bones are mildly demineralized. No bony neural foraminal narrowing. There is prominent grade 1 anterolisthesis of L5 on S1. Moderate multilevel spondylotic changes. Mild left convex lumbar curvature. Mild superior endplate concavity at D66, T12 and L1, favored to be chronic. The sacroiliac joints appear intact. Calcified atherosclerotic plaque in the abdominal aorta. 1. Prominent grade 1 anterolisthesis of L5 on S1. 2. Mild superior endplate concavity from T11-L1, favored to be chronic. If there is clinical concern for acute compression fracture, suggest correlation with CT or MRI of the lumbar spine. 3. Moderate lumbosacral spondylotic change. MRI LUMBAR SPINE WO CONTRAST    Result Date: 6/16/2022  EXAMINATION: MRI OF THE LUMBAR SPINE WITHOUT CONTRAST, 6/16/2022 1:21 pm TECHNIQUE: Multiplanar multisequence MRI of the lumbar spine was performed without the administration of intravenous contrast. COMPARISON: None. HISTORY: ORDERING SYSTEM PROVIDED HISTORY: acute low back pain intractable over 6 weeks TECHNOLOGIST PROVIDED HISTORY: acute low back pain intractable over 6 weeks Reason for Exam: acute low back pain intractable over 6 weeks Additional signs and symptoms: lower back pain, no fall, pt states she was moving furniture around her house by herself FINDINGS: BONES/ALIGNMENT: The vertebral body heights are maintained. There is age-appropriate bone marrow signal.  There is multilevel degenerative disc disease with loss of disc signal.  There is disc space narrowing most pronounced at L4-5. There is bilateral L5 pars defect with grade 1 anterolisthesis of L5 on S1. SPINAL CORD: The conus is normal in caliber and signal and terminates at L2. The cauda equina is unremarkable. SOFT TISSUES: Posterior paraspinal soft tissues are unremarkable. There is right-sided hydronephrosis. L1-L2: There is a circumferential disc bulge. There is no canal stenosis or left foraminal narrowing. There is mild right foraminal narrowing. L2-L3: There is a circumferential disc bulge with facet and ligamentous hypertrophy. There is no canal stenosis or left foraminal narrowing. There is mild right foraminal narrowing. L3-L4: There is a circumferential disc bulge with facet and ligamentous hypertrophy. There is canal stenosis measuring 7 mm in AP dimension. There is mild bilateral foraminal narrowing. L4-L5: There is a circumferential disc bulge with facet hypertrophy. There is no canal stenosis. There is moderate right and moderate to severe left foraminal narrowing. L5-S1: There is a circumferential disc bulge with uncovering of the disc space posteriorly. There is facet hypertrophy. There is no canal stenosis. There is moderate to severe bilateral foraminal narrowing. Multilevel degenerative disc disease with associated facet and ligamentous hypertrophy resulting in canal stenosis at L3-4. Bilateral L5 pars defect with grade 1 anterolisthesis of L5 on S1 resulting in moderate to severe bilateral foraminal narrowing. Foraminal narrowing at the remaining levels as described above. RECOMMENDATIONS: Unavailable     US RENAL COMPLETE    Result Date: 6/14/2022  EXAMINATION: RETROPERITONEAL ULTRASOUND OF THE KIDNEYS AND URINARY BLADDER 6/14/2022 COMPARISON: None HISTORY: ORDERING SYSTEM PROVIDED HISTORY: Hydronephrosis versus renal cysts on bedside ultrasound TECHNOLOGIST PROVIDED HISTORY: Hydronephrosis versus renal cysts on bedside ultrasound FINDINGS: Kidneys: The right kidney measures 1329 cm in length and the left kidney measures 11.9 cm in length. Severe right-sided hydronephrosis without source of obstruction demonstrated. No left-sided hydronephrosis. No nephrolithiasis or soft tissue mass. Severe right-sided hydronephrosis without source of obstruction demonstrated. Unremarkable ultrasound left kidney. XR CHEST PORTABLE    Result Date: 6/12/2022  EXAMINATION: ONE XRAY VIEW OF THE CHEST 6/12/2022 2:00 pm COMPARISON: None. HISTORY: ORDERING SYSTEM PROVIDED HISTORY: shortenss of breath, hx copd TECHNOLOGIST PROVIDED HISTORY: shortenss of breath, hx copd FINDINGS: Lungs are hyperinflated suggesting COPD.   No non-compressibility from mid upper arm to  antecubital fossa with iso echoic echoes present within the lumen. Vessel  appears to be dilated. The basilic vein demonstrates normal compressibility with phasic Doppler  signals. Velocities are measured in cm/s ; Diameters are measured in cm Right UE Vein Measurements 2D Measurements +----------------------------------+----------+---------------+------------+ ! Location                          ! Visualized! Compressibility! Thrombosis  ! +----------------------------------+----------+---------------+------------+ ! Prox IJV                          ! Yes       ! Yes            ! None        ! +----------------------------------+----------+---------------+------------+ ! Dist IJV                          ! Yes       ! Yes            ! None        ! +----------------------------------+----------+---------------+------------+ ! Prox SCV                          ! Yes       ! Yes            ! None        ! +----------------------------------+----------+---------------+------------+ ! Dist SCV                          ! Yes       ! Yes            ! None        ! +----------------------------------+----------+---------------+------------+ ! Innominate                        ! No        !               !            ! +----------------------------------+----------+---------------+------------+ ! Prox Axillary                     ! Yes       ! Yes            ! None        ! +----------------------------------+----------+---------------+------------+ ! Dist Axillary                     ! Yes       ! Yes            ! None        ! +----------------------------------+----------+---------------+------------+ ! Prox Brachial                     !Yes       ! Yes            ! None        ! +----------------------------------+----------+---------------+------------+ ! Dist Brachial                     !Yes       ! Yes            ! None        ! +----------------------------------+----------+---------------+------------+ !Prox Radial                       !Yes       ! Yes            ! None        ! +----------------------------------+----------+---------------+------------+ ! Dist Radial                       !Yes       ! Yes            ! None        ! +----------------------------------+----------+---------------+------------+ ! Prox Ulnar                        ! Yes       ! Yes            ! None        ! +----------------------------------+----------+---------------+------------+ ! Dist Ulnar                        ! Yes       ! Yes            ! None        ! +----------------------------------+----------+---------------+------------+ ! Basilic at UA                     ! Yes       ! Yes            ! None        ! +----------------------------------+----------+---------------+------------+ ! Basilic at AF                     ! Yes       ! Yes            ! None        ! +----------------------------------+----------+---------------+------------+ ! Basilic at [address]                     ! Yes       ! Yes            ! None        ! +----------------------------------+----------+---------------+------------+ ! Cephalic at UA                    ! Yes       ! No             !Heterogenous! +----------------------------------+----------+---------------+------------+ ! Cephalic at AF                    ! Yes       ! No             !Heterogenous! +----------------------------------+----------+---------------+------------+ ! Cephalic at [address]                    ! Yes       ! Yes            ! None        ! +----------------------------------+----------+---------------+------------+ Doppler Measurements +-------------------------+-----------------------+------------------------+ ! Location                 ! Signal                 !Reflux                  ! +-------------------------+-----------------------+------------------------+ ! IJV                      ! Phasic                 !                        ! +-------------------------+-----------------------+------------------------+ !SCV                      !Phasic                 !                        ! +-------------------------+-----------------------+------------------------+ ! Axillary                 ! Phasic                 !                        ! +-------------------------+-----------------------+------------------------+ ! Brachial                 !Phasic                 !                        ! +-------------------------+-----------------------+------------------------+        Physical Examination:        Physical Exam  Constitutional:       General: She is not in acute distress. Appearance: Normal appearance. HENT:      Head: Normocephalic and atraumatic. Cardiovascular:      Rate and Rhythm: Normal rate and regular rhythm. Pulses: Normal pulses. Heart sounds: Normal heart sounds. No murmur heard. Pulmonary:      Effort: Pulmonary effort is normal.      Breath sounds: Normal breath sounds. No wheezing, rhonchi or rales. Abdominal:      General: Abdomen is flat. Bowel sounds are normal. There is no distension. Palpations: Abdomen is soft. There is no mass. Tenderness: There is no abdominal tenderness. Musculoskeletal:      Right lower leg: No edema. Left lower leg: No edema. Skin:     Findings: Erythema present. Comments: Erythema, tenderness over flexor surface of right elbow    Neurological:      Mental Status: She is alert.            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, M43.10] 06/17/2022     Priority: Medium    Spinal stenosis, lumbar region, without neurogenic claudication [M48.061] 06/17/2022     Priority: Medium    Acute midline low back pain without sciatica [M54.50] 06/15/2022     Priority: Medium    Upper GI bleeding [K92.2] 06/13/2022     Priority: Medium    Duodenal ulcer [K26.9] 06/13/2022     Priority: Medium    Anemia [D64.9] 06/12/2022     Priority: Medium    Leukocytosis (leucocytosis) [D72.829] 06/12/2022     Priority: Medium    Hypothyroidism [E03.9] 06/12/2022     Priority: Medium    [facility] [J44.9] 06/12/2022     Priority: Medium       Plan:        Anemia 2/2 acute blood loss from duodenal ulcer 2/2 NSAID use-Improving   -Hemoglobin is 4.4->8.3> 8.6>9.1  -Patient received 4 unit of PRBC   -GI consulted              - EGD showed large duodenal ulcer  -Regular diet   -Protonix 40 mg IV BID .  -Carafate   -General surgery consulted                    Hypokalemia- resolved   - K 3.6  - 40 meq ordered       Superficial venous thrombosis of the right cephalic vein  -Apply warm compresses    Leukocytosis, resolved  -WBC is normal   -Normal lactate  -Negative chest x-ray  -negative urinalysis, CRP wnl        Hypothyroidism   -TSH is 11.5  -Continue home Synthroid 88 mcg Daily      Hypertension  -will resume home medications, Lisinopril and Norvasc      Diet: regular diet  DVT prophylaxis: Pneumatic compression  GI prophylaxis: IV Protonix     Linder Bloch, MD  6/18/2022  8:15 AM   Attending Physician Statement  I have discussed the care of Any Garibay and I have examined the patient myselft and taken ros and hpi , including pertinent history and exam findings,  with the resident. I have reviewed the key elements of all parts of the encounter with the resident. I agree with the assessment, plan and orders as documented by the resident.   Dc nsaids  ppi oral  Pud  Sever upper gi bleed from nsaids induced PUD      Electronically signed by Toni Ames MD

## 2022-06-18 NOTE — PROGRESS NOTES
Patient educated on discharge instructions which include: medication schedule, reasons for new medications and some side effects and need to follow-up. Patient informed [facility] [address]. Patient verbalizes understanding of discharge and states readiness for discharge. All belongings were gathered by patient and in patient's possession. No distress noted at this time.      Current vital signs:  BP (!) 146/65   Pulse 86   Temp 97.4 °F (36.3 °C) (Axillary)   Resp 16   Ht 5' 2\" (1.575 m)   Wt 146 lb 9.7 oz (66.5 kg)   SpO2 96%   BMI 26.81 kg/m²                MEWS Score: 1

## 2022-06-19 NOTE — DISCHARGE SUMMARY
[address]    Discharge Summary     Patient ID: Fernando Mirza  :  1945   MRN: [id number]     ACCOUNT:  [de-identified]   Patient's PCP: Jose Enrique Salinas MD  Admit Date: 2022   Discharge Date: 2022     Length of Stay: 6  Code Status:  Prior  Admitting Physician: Grabiel Guillen MD  Discharge Physician: Cindy Lerma MD     Active Discharge Diagnoses:       Primary Problem  Duodenal ulcer      [place] Problems    Diagnosis Date Noted    Tobacco abuse [Z72.0] 2022     Priority: Medium    Spondylolysis with spondylolisthesis [M43.00, M43.10] 2022     Priority: Medium    Spinal stenosis, lumbar region, without neurogenic claudication [M48.061] 2022     Priority: Medium    Acute midline low back pain without sciatica [M54.50] 06/15/2022     Priority: Medium    Upper GI bleeding [K92.2] 2022     Priority: Medium    Duodenal ulcer [K26.9] 2022     Priority: Medium    Anemia [D64.9] 2022     Priority: Medium    Leukocytosis (leucocytosis) [D72.829] 2022     Priority: Medium    Hypothyroidism [E03.9] 2022     Priority: Medium    COPD (chronic obstructive pulmonary disease) ([facility] [J44.9] 2022     Priority: Medium       Admission Condition:  poor     Discharged Condition: good    Hospital Stay:       Hospital Course:  Fernando Mirza is a 65 y.o. female who was admitted for the management of  Duodenal ulcer , presented to ER with Fatigue and Shortness of Breath. She was monitored for the last month. On arrival, and hemoglobin was 4.4 dropped from 15 in 1 month. Patient received total of 4 PRBC units. endoscopy was done and showed large duodenal ulcer. Patient was started on Protonix drip, Carafate. Patient was watched for couple of days. Hemoglobin improved during admission was stable on discharge. On discharge, patient was vitally stable.   Her symptoms resolved. During her stay, patient developed superficial venous thrombosis of the right cephalic vein. Patient was discharged on Protonix 40 mg twice daily for 1 month, Carafate 3 times daily for 5 days. Patient needs to follow-up with GI for further evaluation and assessment. Significant therapeutic interventions:     Significant Diagnostic Studies:   Labs / Micro:  CBC:   Lab Results   Component Value Date    WBC 8.8 06/18/2022    RBC 2.86 06/18/2022    HGB 9.1 06/18/2022    HCT 27.6 06/18/2022    MCV 96.3 06/18/2022    MCH 31.9 06/18/2022    MCHC 33.1 06/18/2022    RDW 21.3 06/18/2022     06/18/2022     BMP:    Lab Results   Component Value Date    GLUCOSE 104 06/18/2022     06/18/2022    K 3.6 06/18/2022     06/18/2022    CO2 22 06/18/2022    ANIONGAP 8 06/18/2022    BUN 6 06/18/2022    CREATININE 0.49 06/18/2022    CALCIUM 7.8 06/18/2022    LABGLOM >60 06/18/2022    GFRAA >60 06/18/2022    GFR      06/18/2022       Radiology:    XR LUMBAR SPINE (MIN 4 VIEWS)    Result Date: 6/13/2022  EXAMINATION: 5 XRAY VIEWS OF THE LUMBAR SPINE 6/13/2022 6:50 pm COMPARISON: None. HISTORY: ORDERING SYSTEM PROVIDED HISTORY: backache TECHNOLOGIST PROVIDED HISTORY: backache Reason for Exam: Backache FINDINGS: The bones are mildly demineralized. No bony neural foraminal narrowing. There is prominent grade 1 anterolisthesis of L5 on S1. Moderate multilevel spondylotic changes. Mild left convex lumbar curvature. Mild superior endplate concavity at S44, T12 and L1, favored to be chronic. The sacroiliac joints appear intact. Calcified atherosclerotic plaque in the abdominal aorta. 1. Prominent grade 1 anterolisthesis of L5 on S1. 2. Mild superior endplate concavity from T11-L1, favored to be chronic. If there is clinical concern for acute compression fracture, suggest correlation with CT or MRI of the lumbar spine. 3. Moderate lumbosacral spondylotic change.      MRI LUMBAR SPINE WO CONTRAST    Result Date: 6/16/2022  EXAMINATION: MRI OF THE LUMBAR SPINE WITHOUT CONTRAST, 6/16/2022 1:21 pm TECHNIQUE: Multiplanar multisequence MRI of the lumbar spine was performed without the administration of intravenous contrast. COMPARISON: None. HISTORY: ORDERING SYSTEM PROVIDED HISTORY: acute low back pain intractable over 6 weeks TECHNOLOGIST PROVIDED HISTORY: acute low back pain intractable over 6 weeks Reason for Exam: acute low back pain intractable over 6 weeks Additional signs and symptoms: lower back pain, no fall, pt states she was moving furniture around her house by herself FINDINGS: BONES/ALIGNMENT: The vertebral body heights are maintained. There is age-appropriate bone marrow signal.  There is multilevel degenerative disc disease with loss of disc signal.  There is disc space narrowing most pronounced at L4-5. There is bilateral L5 pars defect with grade 1 anterolisthesis of L5 on S1. SPINAL CORD: The conus is normal in caliber and signal and terminates at L2. The cauda equina is unremarkable. SOFT TISSUES: Posterior paraspinal soft tissues are unremarkable. There is right-sided hydronephrosis. L1-L2: There is a circumferential disc bulge. There is no canal stenosis or left foraminal narrowing. There is mild right foraminal narrowing. L2-L3: There is a circumferential disc bulge with facet and ligamentous hypertrophy. There is no canal stenosis or left foraminal narrowing. There is mild right foraminal narrowing. L3-L4: There is a circumferential disc bulge with facet and ligamentous hypertrophy. There is canal stenosis measuring 7 mm in AP dimension. There is mild bilateral foraminal narrowing. L4-L5: There is a circumferential disc bulge with facet hypertrophy. There is no canal stenosis. There is moderate right and moderate to severe left foraminal narrowing. L5-S1: There is a circumferential disc bulge with uncovering of the disc space posteriorly. There is facet hypertrophy. There is no canal stenosis. There is moderate to severe bilateral foraminal narrowing. Multilevel degenerative disc disease with associated facet and ligamentous hypertrophy resulting in canal stenosis at L3-4. Bilateral L5 pars defect with grade 1 anterolisthesis of L5 on S1 resulting in moderate to severe bilateral foraminal narrowing. Foraminal narrowing at the remaining levels as described above. RECOMMENDATIONS: Unavailable     US RENAL COMPLETE    Result Date: 6/14/2022  EXAMINATION: RETROPERITONEAL ULTRASOUND OF THE KIDNEYS AND URINARY BLADDER 6/14/2022 COMPARISON: None HISTORY: ORDERING SYSTEM PROVIDED HISTORY: Hydronephrosis versus renal cysts on bedside ultrasound TECHNOLOGIST PROVIDED HISTORY: Hydronephrosis versus renal cysts on bedside ultrasound FINDINGS: Kidneys: The right kidney measures 1329 cm in length and the left kidney measures 11.9 cm in length. Severe right-sided hydronephrosis without source of obstruction demonstrated. No left-sided hydronephrosis. No nephrolithiasis or soft tissue mass. Severe right-sided hydronephrosis without source of obstruction demonstrated. Unremarkable ultrasound left kidney. XR CHEST PORTABLE    Result Date: 6/12/2022  EXAMINATION: ONE XRAY VIEW OF THE CHEST 6/12/2022 2:00 pm COMPARISON: None. HISTORY: ORDERING SYSTEM PROVIDED HISTORY: shortenss of breath, hx copd TECHNOLOGIST PROVIDED HISTORY: shortenss of breath, hx copd FINDINGS: Lungs are hyperinflated suggesting COPD. No focal consolidation. No pneumothorax or pleural effusion. Cardiac and mediastinal silhouettes unremarkable. No acute osseous abnormality. Generalized osteopenia. Telemetry leads overlie the chest.     COPD. No acute findings.      VL venous duplex upper extremity right    Result Date: 6/18/2022    [facility]  Vascular Upper Extremities Veins Procedure   Patient Name   Saadia Goodman      Date of Study           06/17/2022                 DANYELLE   Date of Birth 1945  Gender                  Female   Age            65 year(s)  Race                       Room Number    2112   Corporate ID # [id number]   Patient Acct # [de-identified]   MR #           [id number]      Sonographer             Jose Luis Rizo   Accession #    [id number]  Interpreting Physician  [address] [place] [address]   Referring      Twila Garces, Referring Physician  Nurse          CNP  Practitioner  Procedure Type of Study:   Veins: Upper Extremities Veins, Venous Scan Upper Right. Indications for Study:Arm swelling. Patient Status: In Patient. Technical Quality:Adequate visualization.   - Critical Result:ROMELIA Romero at 8:40am on 06/17/2022. Conclusions   Summary   Sub acute superficial vein thrombosis of the right upper extremity  involving the cephalic vein. No evidence of DVT in the right upper  extremity. Signature   ----------------------------------------------------------------  Electronically signed by Jose Luis Rizo(Sonographer) on  06/17/2022 09:42 AM  ----------------------------------------------------------------   ----------------------------------------------------------------  Electronically signed by Michaele Gimenez Reyes,Arthur(Interpreting  physician) on 06/18/2022 06:51 AM  ----------------------------------------------------------------  Findings:   Right Impression:   Internal jugular, subclavian, axillary, brachial, radial, and ulnar veins  demonstrate normal compressibility with phasic Doppler signals. Cephalic vein demonstrates non-compressibility from mid upper arm to  antecubital fossa with iso echoic echoes present within the lumen. Vessel  appears to be dilated. The basilic vein demonstrates normal compressibility with phasic Doppler  signals. Velocities are measured in cm/s ; Diameters are measured in cm Right UE Vein Measurements 2D Measurements +----------------------------------+----------+---------------+------------+ ! Location !Visualized! Compressibility! Thrombosis  ! +----------------------------------+----------+---------------+------------+ ! Prox IJV                          ! Yes       ! Yes            ! None        ! +----------------------------------+----------+---------------+------------+ ! Dist IJV                          ! Yes       ! Yes            ! None        ! +----------------------------------+----------+---------------+------------+ ! Prox SCV                          ! Yes       ! Yes            ! None        ! +----------------------------------+----------+---------------+------------+ ! Dist SCV                          ! Yes       ! Yes            ! None        ! +----------------------------------+----------+---------------+------------+ ! Innominate                        ! No        !               !            ! +----------------------------------+----------+---------------+------------+ ! Prox Axillary                     ! Yes       ! Yes            ! None        ! +----------------------------------+----------+---------------+------------+ ! Dist Axillary                     ! Yes       ! Yes            ! None        ! +----------------------------------+----------+---------------+------------+ ! Prox Brachial                     !Yes       ! Yes            ! None        ! +----------------------------------+----------+---------------+------------+ ! Dist Brachial                     !Yes       ! Yes            ! None        ! +----------------------------------+----------+---------------+------------+ ! Prox Radial                       !Yes       ! Yes            ! None        ! +----------------------------------+----------+---------------+------------+ ! Dist Radial                       !Yes       ! Yes            ! None        ! +----------------------------------+----------+---------------+------------+ ! Prox Ulnar                        ! Yes       ! Yes            ! None        ! +----------------------------------+----------+---------------+------------+ ! Dist Ulnar                        ! Yes       ! Yes            ! None        ! +----------------------------------+----------+---------------+------------+ ! Basilic at UA                     ! Yes       ! Yes            ! None        ! +----------------------------------+----------+---------------+------------+ ! Basilic at AF                     ! Yes       ! Yes            ! None        ! +----------------------------------+----------+---------------+------------+ ! Basilic at [address]                     ! Yes       ! Yes            ! None        ! +----------------------------------+----------+---------------+------------+ ! Cephalic at UA                    ! Yes       ! No             !Heterogenous! +----------------------------------+----------+---------------+------------+ ! Cephalic at AF                    ! Yes       ! No             !Heterogenous! +----------------------------------+----------+---------------+------------+ ! Cephalic at [address]                    ! Yes       ! Yes            ! None        ! +----------------------------------+----------+---------------+------------+ Doppler Measurements +-------------------------+-----------------------+------------------------+ ! Location                 ! Signal                 !Reflux                  ! +-------------------------+-----------------------+------------------------+ ! IJV                      ! Phasic                 !                        ! +-------------------------+-----------------------+------------------------+ ! SCV                      ! Phasic                 !                        ! +-------------------------+-----------------------+------------------------+ ! Axillary                 ! Phasic                 !                        ! +-------------------------+-----------------------+------------------------+ ! Brachial                 !Phasic                 !                        ! +-------------------------+-----------------------+------------------------+        Consultations:    Consults:     Final Specialist Recommendations/Findings:   IP CONSULT TO INTERNAL MEDICINE  IP CONSULT TO GI  IP CONSULT TO SOCIAL WORK  IP CONSULT TO ORTHOPEDIC SURGERY  IP CONSULT TO GENERAL SURGERY      The patient was seen and examined on day of discharge and this discharge summary is in conjunction with any daily progress note from day of discharge.     Discharge plan:       Disposition: Home    Physician Follow Up:     Sandra Leos MD  [place] [address], [address] [address]  [phone]    Schedule an appointment as soon as possible for a visit in 2 weeks  [facility] follow [facility]    [facility]  [phone]    Schedule an appointment as soon as possible for a visit in 1 week  post hospital follow-up; new patient appointment    Juliane Whitfield, [address] [place] [address]  [phone]    Schedule an appointment as soon as possible for a visit in 10 days  post hospital follow-up       Requiring Further Evaluation/Follow Up POST HOSPITALIZATION/Incidental Findings:     Diet: regular diet    Activity: As tolerated    Instructions to Patient:     Discharge Medications:      Medication List        START taking these medications      pantoprazole 40 MG tablet  Commonly known as: PROTONIX  Take 1 tablet by mouth 2 times daily (before meals)     sucralfate 1 GM tablet  Commonly known as: CARAFATE  Take 1 tablet by mouth in the morning, at noon, and at bedtime for 5 days            CONTINUE taking these medications      albuterol (2.5 MG/3ML) 0.083% nebulizer solution  Commonly known as: PROVENTIL  Take 3 mLs by nebulization every 6 hours as needed for Wheezing     amLODIPine 5 MG tablet  Commonly known as: NORVASC     atorvastatin 20 MG tablet  Commonly known as: LIPITOR     levothyroxine 88 MCG tablet  Commonly known as: SYNTHROID     lisinopril 10 MG tablet  Commonly known as: PRINIVIL;ZESTRIL     tiotropium 18 MCG inhalation capsule  Commonly known as: SPIRIVA  Inhale 1 capsule into the lungs daily            STOP taking these medications      ibuprofen 600 MG tablet  Commonly known as: ADVIL;MOTRIN               Where to Get Your Medications        These medications were sent to [facility], [address] [address]      Phone: [phone]   albuterol (2.5 MG/3ML) 0.083% nebulizer solution  pantoprazole 40 MG tablet  sucralfate 1 GM tablet  tiotropium 18 MCG inhalation capsule         Time Spent on discharge is  35 mins in patient examination, evaluation, counseling as well as medication reconciliation, prescriptions for required medications, discharge plan and follow up. Electronically signed by   Lilian Ignacio MD  6/19/2022  3:54 PM      Thank you Dr. Yecenia Turner MD for the opportunity to be involved in this patient's care. Attending Physician Statement  I have discussed the care of Luisa Brarow and I have examined the patient myselft and taken ros and hpi , including pertinent history and exam findings,  with the resident. I have reviewed the key elements of all parts of the encounter with the resident. I agree with the assessment, plan and orders as documented by the resident. I spent over 35 mins in direct patient care as above and reviewing medications and counseling for discharge .         Electronically signed by Savannah Albright MD

## 2022-06-20 RX ORDER — SUCRALFATE 1 G/1
TABLET ORAL
Qty: 15 TABLET | Refills: 0 | OUTPATIENT
Start: 2022-06-20

## 2022-06-22 ENCOUNTER — OFFICE VISIT (OUTPATIENT)
Dept: INTERNAL MEDICINE CLINIC | Age: 77
End: 2022-06-22
Payer: MEDICARE

## 2022-06-22 VITALS — BODY MASS INDEX: 22.5 KG/M2 | WEIGHT: 123 LBS | DIASTOLIC BLOOD PRESSURE: 70 MMHG | SYSTOLIC BLOOD PRESSURE: 128 MMHG

## 2022-06-22 DIAGNOSIS — M48.061 SPINAL STENOSIS, LUMBAR REGION, WITHOUT NEUROGENIC CLAUDICATION: Chronic | ICD-10-CM

## 2022-06-22 DIAGNOSIS — K92.2 UPPER GI BLEEDING: ICD-10-CM

## 2022-06-22 DIAGNOSIS — E03.4 HYPOTHYROIDISM DUE TO ACQUIRED ATROPHY OF THYROID: Chronic | ICD-10-CM

## 2022-06-22 DIAGNOSIS — J44.9 CHRONIC OBSTRUCTIVE PULMONARY DISEASE, UNSPECIFIED COPD TYPE (HCC): Chronic | ICD-10-CM

## 2022-06-22 DIAGNOSIS — D50.0 IRON DEFICIENCY ANEMIA DUE TO CHRONIC BLOOD LOSS: ICD-10-CM

## 2022-06-22 DIAGNOSIS — Z09 HOSPITAL DISCHARGE FOLLOW-UP: Primary | ICD-10-CM

## 2022-06-22 DIAGNOSIS — K26.9 DUODENAL ULCER: ICD-10-CM

## 2022-06-22 PROBLEM — D72.829 LEUKOCYTOSIS (LEUCOCYTOSIS): Status: RESOLVED | Noted: 2022-06-12 | Resolved: 2022-06-22

## 2022-06-22 PROCEDURE — 99214 OFFICE O/P EST MOD 30 MIN: CPT | Performed by: INTERNAL MEDICINE

## 2022-06-22 PROCEDURE — 1111F DSCHRG MED/CURRENT MED MERGE: CPT | Performed by: INTERNAL MEDICINE

## 2022-06-22 RX ORDER — SUCRALFATE 1 G/1
1 TABLET ORAL 3 TIMES DAILY
Qty: 30 TABLET | Refills: 0 | Status: SHIPPED | OUTPATIENT
Start: 2022-06-22 | End: 2022-07-20

## 2022-06-22 RX ORDER — LISINOPRIL 10 MG/1
10 TABLET ORAL DAILY
Qty: 30 TABLET | Refills: 5 | Status: SHIPPED | OUTPATIENT
Start: 2022-06-22 | End: 2022-07-26 | Stop reason: SDUPTHER

## 2022-06-22 RX ORDER — LEVOTHYROXINE SODIUM 88 UG/1
88 TABLET ORAL DAILY
Qty: 30 TABLET | Refills: 5 | Status: SHIPPED | OUTPATIENT
Start: 2022-06-22 | End: 2022-07-26 | Stop reason: SDUPTHER

## 2022-06-22 RX ORDER — PHENOL 1.4 %
AEROSOL, SPRAY (ML) MUCOUS MEMBRANE
COMMUNITY
End: 2022-06-22 | Stop reason: SDUPTHER

## 2022-06-22 RX ORDER — ACETAMINOPHEN 160 MG
TABLET,DISINTEGRATING ORAL
COMMUNITY

## 2022-06-22 RX ORDER — CHLORAL HYDRATE 500 MG
3000 CAPSULE ORAL 3 TIMES DAILY
COMMUNITY
End: 2022-06-22

## 2022-06-22 RX ORDER — PANTOPRAZOLE SODIUM 40 MG/1
40 TABLET, DELAYED RELEASE ORAL
Qty: 60 TABLET | Refills: 2 | Status: SHIPPED | OUTPATIENT
Start: 2022-06-22 | End: 2022-07-20

## 2022-06-22 RX ORDER — ACETAMINOPHEN/DIPHENHYDRAMINE 500MG-25MG
1 TABLET ORAL NIGHTLY PRN
Qty: 30 TABLET | Refills: 0 | Status: SHIPPED | COMMUNITY
Start: 2022-06-22 | End: 2022-07-20

## 2022-06-22 RX ORDER — AMLODIPINE BESYLATE 5 MG/1
5 TABLET ORAL DAILY
Qty: 30 TABLET | Refills: 5 | Status: SHIPPED | OUTPATIENT
Start: 2022-06-22 | End: 2022-07-26 | Stop reason: SDUPTHER

## 2022-06-22 RX ORDER — VITAMIN B COMPLEX
1 CAPSULE ORAL DAILY
COMMUNITY

## 2022-06-22 RX ORDER — PHENOL 1.4 %
1 AEROSOL, SPRAY (ML) MUCOUS MEMBRANE DAILY
Qty: 100 TABLET | Refills: 3 | Status: SHIPPED | OUTPATIENT
Start: 2022-06-22

## 2022-06-22 RX ORDER — ATORVASTATIN CALCIUM 20 MG/1
20 TABLET, FILM COATED ORAL DAILY
Qty: 30 TABLET | Refills: 5 | Status: SHIPPED | OUTPATIENT
Start: 2022-06-22 | End: 2022-07-26 | Stop reason: SDUPTHER

## 2022-06-22 RX ORDER — ASCORBIC ACID 100 MG
TABLET,CHEWABLE ORAL
COMMUNITY
End: 2022-06-22

## 2022-06-22 ASSESSMENT — PATIENT HEALTH QUESTIONNAIRE - PHQ9
2. FEELING DOWN, DEPRESSED OR HOPELESS: 0
SUM OF ALL RESPONSES TO PHQ QUESTIONS 1-9: 0
SUM OF ALL RESPONSES TO PHQ QUESTIONS 1-9: 0
1. LITTLE INTEREST OR PLEASURE IN DOING THINGS: 0
SUM OF ALL RESPONSES TO PHQ QUESTIONS 1-9: 0
SUM OF ALL RESPONSES TO PHQ QUESTIONS 1-9: 0
SUM OF ALL RESPONSES TO PHQ9 QUESTIONS 1 & 2: 0

## 2022-06-22 ASSESSMENT — LIFESTYLE VARIABLES: HOW OFTEN DO YOU HAVE A DRINK CONTAINING ALCOHOL: NEVER

## 2022-06-22 NOTE — PROGRESS NOTES
Visit Information    Have you changed or started any medications since your last visit including any over-the-counter medicines, vitamins, or herbal medicines? no   Are you having any side effects from any of your medications? -  no  Have you stopped taking any of your medications? Is so, why? -  no    Have you seen any other physician or provider since your last visit? No  Have you had any other diagnostic tests since your last visit? Yes - Records Obtained  Have you been seen in the emergency room and/or had an admission to a hospital since we last saw you? Yes - Records Obtained  Have you had your routine dental cleaning in the past 6 months? no    Have you activated your [employer] account? If not, what are your barriers? Yes     Patient Care Team:  Vinod Maynard MD as PCP - General (Internal Medicine)    Medical History Review  Past Medical, Family, and Social History reviewed and does not contribute to the patient presenting condition    Health Maintenance   Topic Date Due    Annual Wellness Visit (AWV)  Never done    Lipids  Never done    Depression Screen  Never done    Hepatitis C screen  Never done    DTaP/Tdap/Td vaccine (1 - Tdap) Never done    Shingles vaccine (1 of 2) Never done    DEXA (modify frequency per FRAX score)  Never done    Pneumococcal 65+ years Vaccine (2 - PCV) 08/12/2016    COVID-19 Vaccine (2 - Booster for Joanne series) 06/26/2021    Flu vaccine  Completed    Hepatitis A vaccine  Aged Out    Hepatitis B vaccine  Aged Out    Hib vaccine  Aged Out    Meningococcal (ACWY) vaccine  Aged Out        Post-Discharge Transitional Care  Follow Up      Yolanda Mckenzie   YOB: 1945    Date of Office Visit:  6/22/2022  Date of Hospital Admission: 6/12/22  Date of Hospital Discharge: 6/18/22  Risk of hospital readmission (high >=14%.  Medium >=10%) :Readmission Risk Score: 9.7 ( )      Care management risk score Rising risk (score 2-5) and Complex Care (Scores >=6): 1 Non face to face  following discharge, date last encounter closed (first attempt may have been earlier): *No documented post hospital discharge outreach found in the last 14 days    Call initiated 2 business days of discharge: *No response recorded in the last 14 days    ASSESSMENT/PLAN:   Hospital discharge follow-up  -     [place] DISCHARGE MEDS RECONCILED W/ CURRENT OUTPATIENT MED LIST      Medical Decision Making: moderate complexity S [place] 1 g  Return in 3 months (on 9/22/2022). Subjective:   HPI:  Follow up of Hospital problems/diagnosis(es):  Mayola Goldberg is a 65 y.o. female who was admitted for the management of  Duodenal ulcer , presented to ER with Fatigue and Shortness of Breath. She was monitored for the last month. On arrival, and hemoglobin was 4.4 dropped from 15 in 1 month. Patient received total of 4 PRBC units. endoscopy was done and showed large duodenal ulcer. Patient was started on Protonix drip, Carafate. Patient was watched for couple of days. Hemoglobin improved during admission was stable on discharge. On discharge, patient was vitally stable. Her symptoms resolved. During her stay, patient developed superficial venous thrombosis of the right cephalic vein. Patient was discharged on Protonix 40 mg twice daily for 1 month, Carafate 3 times daily for 5 days. Patient needs to follow-up with GI for further evaluation and assessment. You have to  Inpatient course: Discharge summary reviewed- see chart.     Interval history/Current status:     Patient Active Problem List   Diagnosis    Anemia    Leukocytosis (leucocytosis)    Hypothyroidism    COPD (chronic obstructive pulmonary disease) (HCC)    Upper GI bleeding    Duodenal ulcer    Acute midline low back pain without sciatica    Spondylolysis with spondylolisthesis    Spinal stenosis, lumbar region, without neurogenic claudication    Hyperlipidemia    Tobacco abuse       Medications listed as ordered at the time of discharge from hospital     Medication List          Accurate as of June 22, 2022 10:13 AM. If you have any questions, ask your nurse or doctor.             CONTINUE taking these medications    albuterol (2.5 MG/3ML) 0.083% nebulizer solution  Commonly known as: PROVENTIL  Take 3 mLs by nebulization every 6 hours as needed for Wheezing     amLODIPine 5 MG tablet  Commonly known as: NORVASC     atorvastatin 20 MG tablet  Commonly known as: LIPITOR     b complex vitamins capsule     CO Q 10 PO     fish oil 1000 MG Caps     levothyroxine 88 MCG tablet  Commonly known as: SYNTHROID     lisinopril 10 MG tablet  Commonly known as: PRINIVIL;ZESTRIL     Melatonin 5 MG Caps     Multivitamin Adults 50+ Tabs     NEURIVA PO     pantoprazole 40 MG tablet  Commonly known as: PROTONIX  Take 1 tablet by mouth 2 times daily (before meals)     PROBIOTIC DAILY PO     sucralfate 1 GM tablet  Commonly known as: CARAFATE  Take 1 tablet by mouth in the morning, at noon, and at bedtime for 5 days     tiotropium 18 MCG inhalation capsule  Commonly known as: SPIRIVA  Inhale 1 capsule into the lungs daily     Tylenol 325 MG Caps  Generic drug: Acetaminophen     TYLENOL PM EXTRA STRENGTH PO     Vitamin C 100 MG Chew     Vitamin D3 50 MCG (2000 UT) Caps              Medications marked \"taking\" at this time  Outpatient Medications Marked as Taking for the 6/22/22 encounter (Office Visit) with Obed Mendoza MD   Medication Sig Dispense Refill    Melatonin 5 MG CAPS Take 3 mg by mouth daily      b complex vitamins capsule Take 1 capsule by mouth daily      Cholecalciferol (VITAMIN D3) 50 MCG (2000 UT) CAPS Take by mouth      Multiple Vitamins-Minerals (MULTIVITAMIN ADULTS 50+) TABS Take by mouth      Omega-3 Fatty Acids (FISH OIL) 1000 MG CAPS Take 3,000 mg by mouth 3 times daily      Probiotic Product (PROBIOTIC DAILY PO) Take by mouth      Ascorbic Acid (VITAMIN C) 100 MG CHEW Take by mouth      Coenzyme Q10 (CO Q 10 PO) Take by mouth      Misc Natural Products (NEURIVA PO) Take by mouth      Acetaminophen (TYLENOL) 325 MG CAPS Take by mouth      diphenhydrAMINE-APAP, sleep, (TYLENOL PM EXTRA STRENGTH PO) Take by mouth      albuterol (PROVENTIL) (2.5 MG/3ML) 0.083% nebulizer solution Take 3 mLs by nebulization every 6 hours as needed for Wheezing 120 each 3    tiotropium (SPIRIVA) 18 MCG inhalation capsule Inhale 1 capsule into the lungs daily 30 capsule 3    pantoprazole (PROTONIX) 40 MG tablet Take 1 tablet by mouth 2 times daily (before meals) 30 tablet 1    sucralfate (CARAFATE) 1 GM tablet Take 1 tablet by mouth in the morning, at noon, and at bedtime for 5 days 15 tablet 0    levothyroxine (SYNTHROID) 88 MCG tablet Take 88 mcg by mouth Daily      amLODIPine (NORVASC) 5 MG tablet Take 5 mg by mouth daily      atorvastatin (LIPITOR) 20 MG tablet Take 20 mg by mouth daily      lisinopril (PRINIVIL;ZESTRIL) 10 MG tablet Take 10 mg by mouth daily          Medications patient taking as of now reconciled against medications ordered at time of hospital discharge: Yes    A comprehensive review of systems was negative except for what was noted in the HPI. Objective:    /70 (Site: Left Upper Arm)   Wt 123 lb (55.8 kg)   BMI 22.50 kg/m²         An electronic signature was used to authenticate this note.   --Minoo Paul MD

## 2022-06-24 ENCOUNTER — TELEPHONE (OUTPATIENT)
Dept: OTHER | Facility: CLINIC | Age: 77
End: 2022-06-24

## 2022-06-24 ENCOUNTER — HOSPITAL ENCOUNTER (EMERGENCY)
Age: 77
Discharge: HOME | End: 2022-06-24
Attending: EMERGENCY MEDICINE
Payer: MEDICARE

## 2022-06-24 VITALS
BODY MASS INDEX: 22.63 KG/M2 | TEMPERATURE: 98 F | WEIGHT: 123 LBS | HEART RATE: 87 BPM | HEIGHT: 62 IN | DIASTOLIC BLOOD PRESSURE: 57 MMHG | SYSTOLIC BLOOD PRESSURE: 148 MMHG | OXYGEN SATURATION: 95 % | RESPIRATION RATE: 16 BRPM

## 2022-06-24 DIAGNOSIS — I80.8 SUPERFICIAL THROMBOPHLEBITIS OF LEFT UPPER EXTREMITY: Primary | ICD-10-CM

## 2022-06-24 PROCEDURE — 99283 EMERGENCY DEPT VISIT LOW MDM: CPT

## 2022-06-24 RX ORDER — CEPHALEXIN 500 MG/1
500 CAPSULE ORAL 2 TIMES DAILY
Qty: 14 CAPSULE | Refills: 0 | Status: SHIPPED | OUTPATIENT
Start: 2022-06-24 | End: 2022-10-19 | Stop reason: SDUPTHER

## 2022-06-25 ASSESSMENT — ENCOUNTER SYMPTOMS
EYE DISCHARGE: 0
STRIDOR: 0
VOMITING: 0
CONSTIPATION: 0
WHEEZING: 0
COLOR CHANGE: 1
COUGH: 0
SORE THROAT: 0
EYE REDNESS: 0
NAUSEA: 0
DIARRHEA: 0
SHORTNESS OF BREATH: 0
ABDOMINAL PAIN: 0
EYE PAIN: 0

## 2022-06-25 NOTE — ED NOTES
Mode of arrival (squad #, walk in, police, etc) : walk in        Chief complaint(s): arm swelling        Arrival Note (brief scenario, treatment PTA, etc). : Pt has a small area of swelling and redness  to right forearm from IV site. C= \"Have you ever felt that you should Cut down on your drinking? \"  No  A= \"Have people Annoyed you by criticizing your drinking? \"  No  G= \"Have you ever felt bad or Guilty about your drinking? \"  No  E= \"Have you ever had a drink as an Eye-opener first thing in the morning to steady your nerves or to help a hangover? \"  No      Deferred []      Reason for deferring: N/A    *If yes to two or more: probable alcohol abuse. Erika Carvajal RN  06/24/22 [id number]

## 2022-06-25 NOTE — ED PROVIDER NOTES
[address] [facility]  EMERGENCY DEPARTMENT ENCOUNTER      Pt Name: Ottoniel Longoria  MRN: [id number]  [place] 1945  Date of evaluation: 6/24/2022  Provider: Francesca Caban MD    CHIEF COMPLAINT       Chief Complaint   Patient presents with    Arm Swelling       HISTORY OF PRESENT ILLNESS  (Location/Symptom, Timing/Onset, Context/Setting, Quality, Duration, Modifying Factors, Severity.)   Ottoniel Longoria is a 65 y.o. female who presents to the emergency department complaining of left-sided arm swelling at the IV site. She was discharged from the hospital last Saturday. She relates she had 1 in the right side and it went away with some warm compresses. She is wondering if this is similar or not. She feels like it does have pain to it and there is some swelling with mild redness. She has had no fever that she is aware of and otherwise she feels well. Daughters are at bedside. Nursing Notes were reviewed. REVIEW OF SYSTEMS    (2-9 systems for level 4, 10 or more for level 5)     Review of Systems   Constitutional: Negative for activity change, appetite change, chills, fatigue and fever. HENT: Negative for congestion, ear pain and sore throat. Eyes: Negative for pain, discharge and redness. Respiratory: Negative for cough, shortness of breath, wheezing and stridor. Cardiovascular: Negative for chest pain. Gastrointestinal: Negative for abdominal pain, constipation, diarrhea, nausea and vomiting. Genitourinary: Negative for decreased urine volume and difficulty urinating. Musculoskeletal: Negative for arthralgias and myalgias. Skin: Positive for color change and wound. Negative for rash. Neurological: Negative for dizziness, weakness and headaches. Psychiatric/Behavioral: Negative for behavioral problems and confusion. Except as noted above the remainder of the review of systems was reviewed and negative.        PAST MEDICAL HISTORY     Past Medical History:   Diagnosis Date    COPD (chronic obstructive pulmonary disease) ([facility] [address]     Hyperlipidemia     Hypertension     Kidney stone     Thyroid disease        SURGICAL HISTORY       Past Surgical History:   Procedure Laterality Date    UPPER GASTROINTESTINAL ENDOSCOPY N/A 6/13/2022    EGD performed by Kaden Monique MD at [address] [place] [address]       Discharge Medication List as of 6/24/2022 10:26 PM      CONTINUE these medications which have NOT CHANGED    Details   Melatonin 5 MG CAPS Take 3 mg by mouth dailyHistorical Med      b complex vitamins capsule Take 1 capsule by mouth dailyHistorical Med      Cholecalciferol (VITAMIN D3) 50 MCG (2000 UT) CAPS Take by mouthHistorical Med      Misc Natural Products (NEURIVA PO) Take by mouthHistorical Med      Acetaminophen (TYLENOL) 325 MG CAPS Take by mouthHistorical Med      pantoprazole (PROTONIX) 40 MG tablet Take 1 tablet by mouth 2 times daily (before meals), Disp-60 tablet, R-2Normal      sucralfate (CARAFATE) 1 GM tablet Take 1 tablet by mouth in the morning, at noon, and at bedtime for 5 days, Disp-30 tablet, R-0Normal      Multiple Vitamins-Minerals (MULTIVITAMIN ADULTS 50+) TABS Take 1 tablet by mouth daily, Disp-100 tablet, R-3Normal      levothyroxine (SYNTHROID) 88 MCG tablet Take 1 tablet by mouth Daily, Disp-30 tablet, R-5Normal      amLODIPine (NORVASC) 5 MG tablet Take 1 tablet by mouth daily, Disp-30 tablet, R-5Normal      atorvastatin (LIPITOR) 20 MG tablet Take 1 tablet by mouth daily, Disp-30 tablet, R-5Normal      lisinopril (PRINIVIL;ZESTRIL) 10 MG tablet Take 1 tablet by mouth daily, Disp-30 tablet, R-5Normal      diphenhydrAMINE-APAP, sleep, (TYLENOL PM EXTRA STRENGTH)  MG tablet Take 1 tablet by mouth nightly as needed for Sleep, Disp-30 tablet, R-0OTC      albuterol (PROVENTIL) (2.5 MG/3ML) 0.083% nebulizer solution Take 3 mLs by nebulization every 6 hours as needed for Wheezing, Disp-120 each, R-3Normal      tiotropium (SPIRIVA) 18 MCG inhalation capsule Inhale 1 capsule into the lungs daily, Disp-30 capsule, R-3Normal             ALLERGIES     Patient has no known allergies. FAMILY HISTORY     History reviewed. No pertinent family history. No family status information on file. SOCIAL HISTORY      reports that she has quit smoking. Her smoking use included cigarettes. She smoked 0.50 packs per day. She has never used smokeless tobacco. She reports current alcohol use of about 2.0 standard drinks of alcohol per week. She reports that she does not use drugs. PHYSICAL EXAM    (up to 7 for level 4, 8 or more for level 5)     ED Triage Vitals [06/24/22 2133]   BP Temp Temp Source Heart Rate Resp SpO2 Height Weight   (!) 148/57 98 °F (36.7 °C) Temporal 87 16 95 % 5' 2\" (1.575 m) 123 lb (55.8 kg)     Physical Exam  Vitals and nursing note reviewed. Constitutional:       General: She is not in acute distress. Appearance: She is well-developed. She is not ill-appearing, toxic-appearing or diaphoretic. HENT:      Head: Normocephalic and atraumatic. Right Ear: External ear normal.      Left Ear: External ear normal.      Nose: Nose normal.      Mouth/Throat:      Mouth: Mucous membranes are moist.   Eyes:      General:         Right eye: No discharge. Left eye: No discharge. Conjunctiva/sclera: Conjunctivae normal.      Pupils: Pupils are equal, round, and reactive to light. Cardiovascular:      Rate and Rhythm: Normal rate and regular rhythm. Heart sounds: Normal heart sounds. No murmur heard. Pulmonary:      Effort: Pulmonary effort is normal. No respiratory distress. Breath sounds: Normal breath sounds. No wheezing, rhonchi or rales. Chest:      Chest wall: No tenderness. Abdominal:      General: Bowel sounds are normal. There is no distension. Palpations: Abdomen is soft. There is no mass. Tenderness: There is no abdominal tenderness. There is no guarding or rebound. Musculoskeletal:         General: Normal range of motion. Cervical back: Normal range of motion and neck supple. Right lower leg: No edema. Left lower leg: No edema. Skin:     General: Skin is warm. Findings: Bruising and erythema present. No rash. Comments: Thrombophlebitis palpated   Neurological:      General: No focal deficit present. Mental Status: She is alert and oriented to person, place, and time. Motor: No abnormal muscle tone. Psychiatric:         Mood and Affect: Mood normal.         Behavior: Behavior normal.         DIAGNOSTIC RESULTS     EKG: All EKG's are interpreted by the Emergency Department Physician who either signs or Co-signs this chart in the absence of a cardiologist.    none    RADIOLOGY:   Non-plain film images such as CT, Ultrasound and MRI are read by the radiologist. Plain radiographic images are visualized and preliminarily interpreted by the emergency physician with the below findings:    Interpretation per the Radiologist below, if available at the time of this note:    No orders to display         ED BEDSIDE ULTRASOUND:   Performed by ED Physician - none    LABS:  Labs Reviewed - No data to display    All other labs were within normal range or not returned as of this dictation. EMERGENCY DEPARTMENT COURSE and DIFFERENTIAL DIAGNOSIS/MDM:   Vitals:    Vitals:    06/24/22 2133   BP: (!) 148/57   Pulse: 87   Resp: 16   Temp: 98 °F (36.7 °C)   TempSrc: Temporal   SpO2: 95%   Weight: 55.8 kg (123 lb)   Height: 5' 2\" (1.575 m)     We did discuss that I do certainly think this is superficial thrombophlebitis and she could certainly treat it like she has treated her previous one with hot compresses. I think it will go away on its own. However we did discuss some antibiotic therapy to start in the next 48 hours if symptoms or not improving with hot compresses.   She and family think this is a great idea and they will hold off using it unless necessary. CONSULTS:  None    PROCEDURES:  None    FINAL IMPRESSION      1.  Superficial thrombophlebitis of left upper extremity          DISPOSITION/PLAN   DISPOSITION Decision To Discharge 06/24/2022 10:04:09 PM      PATIENT REFERRED TO:  Harvinder Green MD  [address]  [phone]    Call in 3 days  For wound re-check      DISCHARGE MEDICATIONS:  Discharge Medication List as of 6/24/2022 10:26 PM      START taking these medications    Details   cephALEXin (KEFLEX) 500 MG capsule Take 1 capsule by mouth 2 times daily for 7 days, Disp-14 capsule, R-0Normal             (Please note that portions of this note were completed with a voice recognition program.  Efforts were made to edit the dictations but occasionally words are mis-transcribed.)    Claudio Rueda MD  Attending Emergency Physician        Claudio Rueda MD  06/25/22 4424

## 2022-06-25 NOTE — TELEPHONE ENCOUNTER
Discharge orders were placed before writer contacted ED provider to inform of 30 day readmission risk.

## 2022-07-12 ENCOUNTER — OFFICE VISIT (OUTPATIENT)
Dept: ORTHOPEDIC SURGERY | Age: 77
End: 2022-07-12
Payer: MEDICARE

## 2022-07-12 VITALS — HEIGHT: 62 IN | WEIGHT: 123 LBS | BODY MASS INDEX: 22.63 KG/M2 | RESPIRATION RATE: 14 BRPM

## 2022-07-12 DIAGNOSIS — M54.9 BACK PAIN, UNSPECIFIED BACK LOCATION, UNSPECIFIED BACK PAIN LATERALITY, UNSPECIFIED CHRONICITY: ICD-10-CM

## 2022-07-12 DIAGNOSIS — M43.00 SPONDYLOLYSIS WITH SPONDYLOLISTHESIS: Primary | ICD-10-CM

## 2022-07-12 DIAGNOSIS — M43.10 SPONDYLOLYSIS WITH SPONDYLOLISTHESIS: Primary | ICD-10-CM

## 2022-07-12 PROCEDURE — 1123F ACP DISCUSS/DSCN MKR DOCD: CPT | Performed by: ORTHOPAEDIC SURGERY

## 2022-07-12 PROCEDURE — 1036F TOBACCO NON-USER: CPT | Performed by: ORTHOPAEDIC SURGERY

## 2022-07-12 PROCEDURE — 99213 OFFICE O/P EST LOW 20 MIN: CPT | Performed by: ORTHOPAEDIC SURGERY

## 2022-07-12 PROCEDURE — 1090F PRES/ABSN URINE INCON ASSESS: CPT | Performed by: ORTHOPAEDIC SURGERY

## 2022-07-12 PROCEDURE — G8400 PT W/DXA NO RESULTS DOC: HCPCS | Performed by: ORTHOPAEDIC SURGERY

## 2022-07-12 PROCEDURE — G8427 DOCREV CUR MEDS BY ELIG CLIN: HCPCS | Performed by: ORTHOPAEDIC SURGERY

## 2022-07-12 PROCEDURE — G8420 CALC BMI NORM PARAMETERS: HCPCS | Performed by: ORTHOPAEDIC SURGERY

## 2022-07-12 PROCEDURE — 1111F DSCHRG MED/CURRENT MED MERGE: CPT | Performed by: ORTHOPAEDIC SURGERY

## 2022-07-12 NOTE — PROGRESS NOTES
Patient ID: Randall Jay is a 65 y.o. female    Chief Compliant:  Chief Complaint   Patient presents with    Lower Back Pain        Diagnostic imaging:  MRI is again reviewed patient with a spondylitic spondylolisthesis L5-S1 some moderate stenosis L3 through the sacrum      Assessment and Plan:  1. Spondylolysis with spondylolisthesis    2. Back pain, unspecified back location, unspecified back pain laterality, unspecified chronicity      Acute flare of back pain significantly improved. Starting 6 weeks prior to hospitalization patient was hospitalized with a GI bleed related to NSAIDs but at that time her back pain and resolved    Follow up prn    HPI:  This is a 65 y.o. female who presents to the clinic today as a new patient for lower back evaluation. She reports her acute flare of lower back pain has nearly completely resolved. She notes mild pain after over exerting herself    Review of Systems   All other systems reviewed and are negative.       Past History:    Current Outpatient Medications:     Melatonin 5 MG CAPS, Take 3 mg by mouth daily, Disp: , Rfl:     b complex vitamins capsule, Take 1 capsule by mouth daily, Disp: , Rfl:     Cholecalciferol (VITAMIN D3) 50 MCG (2000 UT) CAPS, Take by mouth, Disp: , Rfl:     Misc Natural Products (NEURIVA PO), Take by mouth, Disp: , Rfl:     Acetaminophen (TYLENOL) 325 MG CAPS, Take by mouth, Disp: , Rfl:     pantoprazole (PROTONIX) 40 MG tablet, Take 1 tablet by mouth 2 times daily (before meals), Disp: 60 tablet, Rfl: 2    sucralfate (CARAFATE) 1 GM tablet, Take 1 tablet by mouth in the morning, at noon, and at bedtime for 5 days, Disp: 30 tablet, Rfl: 0    Multiple Vitamins-Minerals (MULTIVITAMIN ADULTS 50+) TABS, Take 1 tablet by mouth daily, Disp: 100 tablet, Rfl: 3    levothyroxine (SYNTHROID) 88 MCG tablet, Take 1 tablet by mouth Daily, Disp: 30 tablet, Rfl: 5    amLODIPine (NORVASC) 5 MG tablet, Take 1 tablet by mouth daily, Disp: 30 tablet, Rfl: 5    atorvastatin (LIPITOR) 20 MG tablet, Take 1 tablet by mouth daily, Disp: 30 tablet, Rfl: 5    lisinopril (PRINIVIL;ZESTRIL) 10 MG tablet, Take 1 tablet by mouth daily, Disp: 30 tablet, Rfl: 5    diphenhydrAMINE-APAP, sleep, (TYLENOL PM EXTRA STRENGTH)  MG tablet, Take 1 tablet by mouth nightly as needed for Sleep, Disp: 30 tablet, Rfl: 0    albuterol (PROVENTIL) (2.5 MG/3ML) 0.083% nebulizer solution, Take 3 mLs by nebulization every 6 hours as needed for Wheezing, Disp: 120 each, Rfl: 3    tiotropium (SPIRIVA) 18 MCG inhalation capsule, Inhale 1 capsule into the lungs daily, Disp: 30 capsule, Rfl: 3  No Known Allergies  Social History     Socioeconomic History    Marital status: Single     Spouse name: Not on file    Number of children: Not on file    Years of education: Not on file    Highest education level: Not on file   Occupational History    Not on file   Tobacco Use    Smoking status: Former Smoker     Packs/day: 0.50     Types: Cigarettes    Smokeless tobacco: Never Used    Tobacco comment: using patches currently   Substance and Sexual Activity    Alcohol use: Yes     Alcohol/week: 2.0 standard drinks     Types: 2 Glasses of wine per week    Drug use: Never    Sexual activity: Not on file   Other Topics Concern    Not on file   Social History Narrative    Not on file     Social Determinants of Health     Financial Resource Strain:     Difficulty of Paying Living Expenses: Not on file   Food Insecurity:     Worried About Running Out of Food in the Last Year: Not on file    Trung of Food in the Last Year: Not on file   Transportation Needs:     Lack of Transportation (Medical): Not on file    Lack of Transportation (Non-Medical):  Not on file   Physical Activity:     Days of Exercise per Week: Not on file    Minutes of Exercise per Session: Not on file   Stress:     Feeling of Stress : Not on file   Social Connections:     Frequency of Communication with Friends and Family: Not on file    Frequency of Social Gatherings with Friends and Family: Not on file    Attends Orthodoxy Services: Not on file    Active Member of Clubs or Organizations: Not on file    Attends Club or Organization Meetings: Not on file    Marital Status: Not on file   Intimate Partner Violence:     Fear of Current or Ex-Partner: Not on file    Emotionally Abused: Not on file    Physically Abused: Not on file    Sexually Abused: Not on file   Housing Stability:     Unable to Pay for Housing in the Last Year: Not on file    Number of [place] in the Last Year: Not on file    Unstable Housing in the Last Year: Not on file     Past Medical History:   Diagnosis Date    COPD (chronic obstructive pulmonary disease) ([facility] [address]     Hyperlipidemia     Hypertension     Kidney stone     Thyroid disease      Past Surgical History:   Procedure Laterality Date    UPPER GASTROINTESTINAL ENDOSCOPY N/A 6/13/2022    EGD performed by Nia Ewing MD at [facility]     No family history on file. Physical Exam:  Vitals signs and nursing note reviewed. Constitutional:       Appearance: well-developed. HENT:      Head: Normocephalic and atraumatic. Nose: Nose normal.   Eyes:      Conjunctiva/sclera: Conjunctivae normal.   Neck:      Musculoskeletal: Normal range of motion and neck supple. Pulmonary:      Effort: Pulmonary effort is normal. No respiratory distress. Musculoskeletal:      Comments: Normal gait     Skin:     General: Skin is warm and dry. Neurological:      Mental Status: Alert and oriented to person, place, and time. Sensory: No sensory deficit. Psychiatric:         Behavior: Behavior normal.         Thought Content: Thought content normal.        Provider Attestation:  Ken Pandya personally performed the services described in this documentation. All medical record entries made by the scribe were at my direction and in my presence.  I have reviewed the chart and discharge instructions and agree that the records reflect my personal performance and is accurate and complete. Navarro Sutton MD 7/12/22       Scribe Attestation:  By signing my name below, Ab Ismael, attest that this documentation has been prepared under the direction and in the presence of Dr. Cherie Hill. Electronically signed: Corinne Shannon, [place], 7/12/22     Please note that this chart was generated using voice recognition Dragon dictation software. Although every effort was made to ensure the accuracy of this automated transcription, some errors in transcription may have occurred.

## 2022-07-18 ENCOUNTER — HOSPITAL ENCOUNTER (OUTPATIENT)
Age: 77
Setting detail: SPECIMEN
Discharge: HOME | End: 2022-07-18

## 2022-07-18 DIAGNOSIS — D50.0 IRON DEFICIENCY ANEMIA DUE TO CHRONIC BLOOD LOSS: ICD-10-CM

## 2022-07-18 LAB
ALBUMIN SERPL-MCNC: 4.4 G/DL (ref 3.5–5.2)
ALBUMIN/GLOBULIN RATIO: 1.5 (ref 1–2.5)
ALP BLD-CCNC: 91 U/L (ref 35–104)
ALT SERPL-CCNC: 18 U/L (ref 5–33)
ANION GAP SERPL CALCULATED.3IONS-SCNC: 16 MMOL/L (ref 9–17)
AST SERPL-CCNC: 17 U/L
BILIRUB SERPL-MCNC: 0.32 MG/DL (ref 0.3–1.2)
BUN BLDV-MCNC: 20 MG/DL (ref 8–23)
CALCIUM SERPL-MCNC: 9.9 MG/DL (ref 8.6–10.4)
CHLORIDE BLD-SCNC: 98 MMOL/L (ref 98–107)
CO2: 23 MMOL/L (ref 20–31)
CREAT SERPL-MCNC: 0.85 MG/DL (ref 0.5–0.9)
GFR AFRICAN AMERICAN: >60 ML/MIN
GFR NON-AFRICAN AMERICAN: >60 ML/MIN
GFR SERPL CREATININE-BSD FRML MDRD: ABNORMAL ML/MIN/{1.73_M2}
GLUCOSE BLD-MCNC: 100 MG/DL (ref 70–99)
HCT VFR BLD CALC: 44.8 % (ref 36.3–47.1)
HEMOGLOBIN: 13.5 G/DL (ref 11.9–15.1)
MCH RBC QN AUTO: 30.8 PG (ref 25.2–33.5)
MCHC RBC AUTO-ENTMCNC: 30.1 G/DL (ref 28.4–34.8)
MCV RBC AUTO: 102.1 FL (ref 82.6–102.9)
NRBC AUTOMATED: 0 PER 100 WBC
PDW BLD-RTO: 15.3 % (ref 11.8–14.4)
PLATELET # BLD: 289 K/UL (ref 138–453)
PMV BLD AUTO: 11 FL (ref 8.1–13.5)
POTASSIUM SERPL-SCNC: 4.7 MMOL/L (ref 3.7–5.3)
RBC # BLD: 4.39 M/UL (ref 3.95–5.11)
SODIUM BLD-SCNC: 137 MMOL/L (ref 135–144)
TOTAL PROTEIN: 7.4 G/DL (ref 6.4–8.3)
WBC # BLD: 8.4 K/UL (ref 3.5–11.3)

## 2022-07-20 ENCOUNTER — OFFICE VISIT (OUTPATIENT)
Dept: GASTROENTEROLOGY | Age: 77
End: 2022-07-20
Payer: MEDICARE

## 2022-07-20 ENCOUNTER — OFFICE VISIT (OUTPATIENT)
Dept: INTERNAL MEDICINE CLINIC | Age: 77
End: 2022-07-20
Payer: MEDICARE

## 2022-07-20 VITALS
DIASTOLIC BLOOD PRESSURE: 74 MMHG | SYSTOLIC BLOOD PRESSURE: 156 MMHG | WEIGHT: 120 LBS | HEART RATE: 94 BPM | BODY MASS INDEX: 21.95 KG/M2

## 2022-07-20 VITALS
OXYGEN SATURATION: 96 % | SYSTOLIC BLOOD PRESSURE: 118 MMHG | WEIGHT: 120 LBS | DIASTOLIC BLOOD PRESSURE: 68 MMHG | HEIGHT: 62 IN | BODY MASS INDEX: 22.08 KG/M2 | HEART RATE: 100 BPM

## 2022-07-20 DIAGNOSIS — J44.9 CHRONIC OBSTRUCTIVE PULMONARY DISEASE, UNSPECIFIED COPD TYPE (HCC): Chronic | ICD-10-CM

## 2022-07-20 DIAGNOSIS — K26.9 DUODENAL ULCER: Primary | ICD-10-CM

## 2022-07-20 DIAGNOSIS — K92.2 UPPER GI BLEEDING: ICD-10-CM

## 2022-07-20 DIAGNOSIS — D50.0 IRON DEFICIENCY ANEMIA DUE TO CHRONIC BLOOD LOSS: ICD-10-CM

## 2022-07-20 DIAGNOSIS — I80.9 SUPERFICIAL PHLEBITIS: ICD-10-CM

## 2022-07-20 DIAGNOSIS — K26.9 DUODENAL ULCER: ICD-10-CM

## 2022-07-20 DIAGNOSIS — Z78.0 POST-MENOPAUSAL: Primary | ICD-10-CM

## 2022-07-20 PROCEDURE — 1123F ACP DISCUSS/DSCN MKR DOCD: CPT | Performed by: INTERNAL MEDICINE

## 2022-07-20 PROCEDURE — 99214 OFFICE O/P EST MOD 30 MIN: CPT | Performed by: NURSE PRACTITIONER

## 2022-07-20 PROCEDURE — 1123F ACP DISCUSS/DSCN MKR DOCD: CPT | Performed by: NURSE PRACTITIONER

## 2022-07-20 PROCEDURE — 99213 OFFICE O/P EST LOW 20 MIN: CPT | Performed by: INTERNAL MEDICINE

## 2022-07-20 RX ORDER — SIMVASTATIN 40 MG
40 TABLET ORAL DAILY
COMMUNITY

## 2022-07-20 RX ORDER — PANTOPRAZOLE SODIUM 40 MG/1
40 TABLET, DELAYED RELEASE ORAL DAILY
Qty: 90 TABLET | Refills: 3 | Status: SHIPPED | OUTPATIENT
Start: 2022-07-20

## 2022-07-20 ASSESSMENT — ENCOUNTER SYMPTOMS
RESPIRATORY NEGATIVE: 1
CONSTIPATION: 1
ALLERGIC/IMMUNOLOGIC NEGATIVE: 1
EYES NEGATIVE: 1

## 2022-07-20 NOTE — PROGRESS NOTES
OFFICE VISIT  PROGRESS NOTE        Date of patient's visit: 7/20/22  Patient's Name:  Natalia Ramsay  YOB: 1945       Patient Care Team:  Elba Boykin MD as PCP - General (Internal Medicine)  Elba Boykin MD as PCP - [facility] Empaneled Provider       SUBJECTIVE    HISTORY       History of present illness     Pertinent details  added to ,      Chief Complaint  Patient presents with    Follow-Up from [facility]  6/24/22 [facility]     Cough  Can she take cough medications? Or allergie medication? Encounter Diagnoses  Name  Primary? Post-menopausal  Yes    Chronic obstructive pulmonary disease, unspecified COPD type ([facility] [address]    Upper GI bleeding    Superficial phlebitis left arm    Duodenal ulcer    Iron deficiency anemia due to chronic blood loss    Symptom / problem          --history obtained from patient. -  Was in er 6/26/22 for sup phlebitis   Resolved   1. Superficial thrombophlebitis of left upper extremity   Had severe anemia from duodenal ulcer bleed . Hb back to [facility] Course:  Natalia Ramsay is a 65 y.o. female who was admitted for the management of  Duodenal ulcer , presented to ER with Fatigue and Shortness of Breath. She was monitored for the last month. On arrival, and hemoglobin was 4.4 dropped from 15 in 1 month. Patient received total of 4 PRBC units. endoscopy was done and showed large duodenal ulcer. Patient was started on Protonix drip, Carafate. Patient was watched for couple of days. Hemoglobin improved during admission was stable on discharge. On discharge, patient was vitally stable. Her symptoms resolved. During her stay, patient developed superficial venous thrombosis of the right cephalic vein.    Patient was discharged on Protonix 40 mg twice daily for 1 month, Carafate 3 times daily for 5 days. Patient needs to follow-up with GI for further evaluation and assessment. MEDICATIONS:   Current Outpatient Medications  Medication  Sig  Dispense  Refill    simvastatin (ZOCOR) 40 MG tablet  Take 40 mg by mouth in the morning. Melatonin 5 MG CAPS  Take 3 mg by mouth daily    b complex vitamins capsule  Take 1 capsule by mouth daily    Cholecalciferol (VITAMIN D3) 50 MCG (2000 UT) CAPS  Take by mouth    Misc Natural Products (NEURIVA PO)  Take by mouth    Acetaminophen (TYLENOL) 325 MG CAPS  Take by mouth    pantoprazole (PROTONIX) 40 MG tablet  Take 1 tablet by mouth 2 times daily (before meals)  60 tablet  2    Multiple Vitamins-Minerals (MULTIVITAMIN ADULTS 50+) TABS  Take 1 tablet by mouth daily  100 tablet  3    levothyroxine (SYNTHROID) 88 MCG tablet  Take 1 tablet by mouth Daily  30 tablet  5    amLODIPine (NORVASC) 5 MG tablet  Take 1 tablet by mouth daily  30 tablet  5    atorvastatin (LIPITOR) 20 MG tablet  Take 1 tablet by mouth daily  30 tablet  5    lisinopril (PRINIVIL;ZESTRIL) 10 MG tablet  Take 1 tablet by mouth daily  30 tablet  5    albuterol (PROVENTIL) (2.5 MG/3ML) 0.083% nebulizer solution  Take 3 mLs by nebulization every 6 hours as needed for Wheezing  120 each  3    tiotropium (SPIRIVA) 18 MCG inhalation capsule  Inhale 1 capsule into the lungs daily  30 capsule  3  No current facility-administered medications for this visit. ALLERGIES:   No Known Allergies  SOCIAL HISTORY  Reviewed and no change from previous record. Alfred Greene  reports that she has quit smoking. Her smoking use included cigarettes. She smoked an average of .5 packs per day.  She has never used smokeless tobacco.  FAMILY HISTORY:   Reviewed and No change from previous visit  REVIEW OF SYSTEMS:   Review of Systems       OBJECTIVE     Physical exam       Vitals:  07/20/22 0927  BP:  118/68  Pulse:  100  SpO2:  96%  Weight:  120 lb (54.4 kg)  Height:  5' 2\" (1.575 m)        Physical Exam   Vitals:  /68   Pulse 100   Ht 5' 2\" (1.575 m)   Wt 120 lb (54.4 kg)   SpO2 96%   BMI 21.95 kg/m²                Body mass index is 21.95 kg/m².   Vitals:  07/20/22 0927  BP:  118/68  Pulse:  100  SpO2:  96%  Weight:  120 lb (54.4 kg)  Height:  5' 2\" (1.575 m)  General -alert, well appearing, and in no distress  Skin - normal coloration and turgor, no rashes,no suspicious skin lesions noted  Eyes - pupils equal and reactive, extraocular eye movementsintact  Ears - bilateral TM's and external ear canals normal  Nose - normal and patent, no erythema, discharge or polyps  Mouth - mucous membranes are moist, pharynx normal without lesions  Neck - supple, no significant adenopathy  Lymphatics - no palpable lymphadenopathy, no hepatosplenomegaly    Chest - clear to auscultation, no wheezes, rales or rhonchi, symmetric air entry    Heart - normal rate, regular rhythm, no murmurs, rubs, clicks or gallops    Extremities - peripheral pulses normal, no pedal edema       Abdomen - soft, nontender, nondistended, no masses or organomegaly    Back - full range of motion, notenderness, palpable spasm or pain on motion    Neurological - alert, oriented, normal speech, no focal sensory or motor deficit  Musculoskeletal - no joint tenderness, deformity or swelling          DIAGNOSTICS / INSERTS / IMAGES   [x] Reviewed     Labs   URINE ANALYSIS: No results found for: LABURIN  CBC:  Lab Results  Component  Value  Date/Time  WBC  8.4  07/18/2022 08:07 AM  HGB  13.5  07/18/2022 08:07 AM  PLT  289  07/18/2022 08:07 AM  BMP:    Lab Results  Component  Value  Date/Time  NA  137  07/18/2022 08:07 AM  K  4.7  07/18/2022 08:07 AM  CL  98  07/18/2022 08:07 AM  CO2  23  07/18/2022 08:07 AM  BUN  20  07/18/2022 08:07 AM  CREATININE  0.85  07/18/2022 08:07 AM  GLUCOSE  100  07/18/2022 08:07 AM    No results found for: LDLC  No results found for: LABA1C  No results found for: POCGLU   .  LIVER PROFILE:  Lab Results  Component  Value  Date/Time  ALT  18  07/18/2022 08:07 AM  AST  17  07/18/2022 08:07 AM  PROT  7.4  07/18/2022 08:07 AM  BILITOT  0.32  07/18/2022 08:07 AM  LABALBU  4.4  07/18/2022 08:07 AM  Results for orders placed or performed during the hospital encounter of 07/18/22  Comprehensive Metabolic Panel  Result  Value  Ref Range  Glucose  100 (H)  70 - 99 mg/dL  BUN  20  8 - 23 mg/dL  CREATININE  0.85  0.50 - 0.90 mg/dL  Calcium  9.9  8.6 - 10.4 mg/dL  Sodium  137  135 - 144 mmol/L  Potassium  4.7  3.7 - 5.3 mmol/L  Chloride  98  98 - 107 mmol/L  CO2  23  20 - 31 mmol/L  Anion Gap  16  9 - 17 mmol/L  Alkaline Phosphatase  91  35 - 104 U/L  ALT  18  5 - 33 U/L  AST  17  <32 U/L  Total Bilirubin  0.32  0.3 - 1.2 mg/dL  Total Protein  7.4  6.4 - 8.3 g/dL  Albumin  4.4  3.5 - 5.2 g/dL  Albumin/Globulin Ratio  1.5  1.0 - 2.5  GFR Non-African American  >60  >60 mL/min  GFR African American  >60  >60 mL/min  GFR Comment        CBC  Result  Value  Ref Range  WBC  8.4  3.5 - 11.3 k/uL  RBC  4.39  3.95 - 5.11 m/uL  Hemoglobin  13.5  11.9 - 15.1 g/dL  Hematocrit  44.8  36.3 - 47.1 %  MCV  102.1  82.6 - 102.9 fL  MCH  30.8  25.2 - 33.5 pg  MCHC  30.1  28.4 - 34.8 g/dL  RDW  15.3 (H)  11.8 - 14.4 %  Platelets  660  366 - 453 k/uL  MPV  11.0  8.1 - 13.5 fL  NRBC Automated  0.0  0.0 per 100 WBC        VITALS INCLUDING BMI REVIEWED WITH PATIENT  Labs reviewed as noted above   Discussed with patient       ASSESSMENT / Jerome Karimi was seen today for follow-up from hospital and cough. Diagnoses and all orders for this visit:    Post-menopausal    Chronic obstructive pulmonary disease, unspecified COPD type ([address]  stable  Upper GI bleeding  Reolved   Hb 13  Superficial phlebitis left arm  resolved  Duodenal ulcer  On protonix .  Advised to takre protonix 400 mg daily  Iron deficiency anemia due to chronic blood loss    resolved      SALIENT  ACTIONS TODAYS VISIT    Today's office visit SALIENT ACTIONS    ----    Discussed use, benefit, and side effects of prescribed medications. [x] yes    All patient questions answered. Patient voiced understanding. Patient given educational materials - see patient instructions  [] yes      Medications Discontinued During This Encounter  Medication  Reason    diphenhydrAMINE-APAP, sleep, (TYLENOL PM EXTRA STRENGTH)  MG tablet  LIST CLEANUP    sucralfate (CARAFATE) 1 GM tablet  LIST CLEANUP    No orders of the defined types were placed in this encounter. There are no Patient Instructions on file for this visit. Medication List   Accurate as of July 20, 2022 10:03 AM. If you have any questions, ask your nurse or doctor. CONTINUE taking these medications   albuterol (2.5 MG/3ML) 0.083% nebulizer solution  Commonly known as: PROVENTIL  Take 3 mLs by nebulization every 6 hours as needed for Wheezing  amLODIPine 5 MG tablet  Commonly known as: NORVASC  Take 1 tablet by mouth daily  atorvastatin 20 MG tablet  Commonly known as: LIPITOR  Take 1 tablet by mouth daily  b complex vitamins capsule  levothyroxine 88 MCG tablet  Commonly known as: SYNTHROID  Take 1 tablet by mouth Daily  lisinopril 10 MG tablet  Commonly known as: PRINIVIL;ZESTRIL  Take 1 tablet by mouth daily  Melatonin 5 MG Caps  Multivitamin Adults 50+ Tabs  Take 1 tablet by mouth daily  NEURIVA PO  pantoprazole 40 MG tablet  Commonly known as: PROTONIX  Take 1 tablet by mouth 2 times daily (before meals)  simvastatin 40 MG tablet  Commonly known as: ZOCOR  tiotropium 18 MCG inhalation capsule  Commonly known as: SPIRIVA  Inhale 1 capsule into the lungs daily  Tylenol 325 MG Caps  Generic drug: Acetaminophen  Vitamin D3 50 MCG (2000 UT) Caps    FOLLOW UP  PLANS    No follow-ups on file. MD EDWINA Lamb [address].    Phone [phone]   Fax: [phone]  Answering Service: [phone]

## 2022-07-20 NOTE — PROGRESS NOTES
Cholecalciferol (VITAMIN D3) 50 MCG (2000 UT) CAPS, Take by mouth, Disp: , Rfl:     Misc Natural Products (NEURIVA PO), Take by mouth, Disp: , Rfl:     Acetaminophen (TYLENOL) 325 MG CAPS, Take by mouth, Disp: , Rfl:     Multiple Vitamins-Minerals (MULTIVITAMIN ADULTS 50+) TABS, Take 1 tablet by mouth daily, Disp: 100 tablet, Rfl: 3    levothyroxine (SYNTHROID) 88 MCG tablet, Take 1 tablet by mouth Daily, Disp: 30 tablet, Rfl: 5    amLODIPine (NORVASC) 5 MG tablet, Take 1 tablet by mouth daily, Disp: 30 tablet, Rfl: 5    atorvastatin (LIPITOR) 20 MG tablet, Take 1 tablet by mouth daily, Disp: 30 tablet, Rfl: 5    lisinopril (PRINIVIL;ZESTRIL) 10 MG tablet, Take 1 tablet by mouth daily, Disp: 30 tablet, Rfl: 5    albuterol (PROVENTIL) (2.5 MG/3ML) 0.083% nebulizer solution, Take 3 mLs by nebulization every 6 hours as needed for Wheezing, Disp: 120 each, Rfl: 3    tiotropium (SPIRIVA) 18 MCG inhalation capsule, Inhale 1 capsule into the lungs daily, Disp: 30 capsule, Rfl: 3    ALLERGIES:   No Known Allergies    FAMILY HISTORY: No family history on file. SOCIAL HISTORY:   Social History     Socioeconomic History    Marital status: Single     Spouse name: Not on file    Number of children: Not on file    Years of education: Not on file    Highest education level: Not on file   Occupational History    Not on file   Tobacco Use    Smoking status: Former     Packs/day: 0.50     Types: Cigarettes    Smokeless tobacco: Never    Tobacco comments:     using patches currently   Substance and Sexual Activity    Alcohol use:  Yes     Alcohol/week: 2.0 standard drinks     Types: 2 Glasses of wine per week    Drug use: Never    Sexual activity: Not on file   Other Topics Concern    Not on file   Social History Narrative    Not on file     Social Determinants of Health     Financial Resource Strain: Not on file   Food Insecurity: Not on file   Transportation Needs: Not on file   Physical Activity: Not on file   Stress: Not on file   Social Connections: Not on file   Intimate Partner Violence: Not on file   Housing Stability: Not on file       REVIEW OF SYSTEMS: A 12-point review of systemswas obtained and pertinent positives and negatives were enumerated above in the history of present illness. All other reviewed systems / symptoms were negative. Review of Systems   Constitutional: Negative. HENT: Negative. Eyes: Negative. Respiratory: Negative. Cardiovascular: Negative. Gastrointestinal:  Positive for constipation. Endocrine: Negative. Genitourinary: Negative. Musculoskeletal: Negative. Skin: Negative. Allergic/Immunologic: Negative. Neurological: Negative. Hematological:  Bruises/bleeds easily. Psychiatric/Behavioral: Negative. PHYSICAL EXAMINATION: Vital signs reviewed per the nursing documentation. There were no vitals taken for this visit. There is no height or weight on file to calculate BMI. Physical Exam  Constitutional:       Appearance: Normal appearance. Eyes:      General: No scleral icterus. Pupils: Pupils are equal, round, and reactive to light. Cardiovascular:      Rate and Rhythm: Normal rate and regular rhythm. Heart sounds: Normal heart sounds. Pulmonary:      Effort: Pulmonary effort is normal.      Breath sounds: Normal breath sounds. Abdominal:      General: Bowel sounds are normal. There is no distension. Palpations: Abdomen is soft. There is no mass. Tenderness: There is no abdominal tenderness. There is no guarding. Skin:     General: Skin is warm and dry. Coloration: Skin is not jaundiced. Neurological:      Mental Status: She is alert and oriented to person, place, and time. Mental status is at baseline.          LABORATORY DATA: Reviewed  Lab Results   Component Value Date    WBC 8.4 07/18/2022    HGB 13.5 07/18/2022    HCT 44.8 07/18/2022    .1 07/18/2022     07/18/2022     07/18/2022    K 4.7 07/18/2022    CL 98 07/18/2022    CO2 23 07/18/2022    BUN 20 07/18/2022    CREATININE 0.85 07/18/2022    LABALBU 4.4 07/18/2022    BILITOT 0.32 07/18/2022    ALKPHOS 91 07/18/2022    AST 17 07/18/2022    ALT 18 07/18/2022    INR 1.1 06/12/2022         Lab Results   Component Value Date    RBC 4.39 07/18/2022    HGB 13.5 07/18/2022    .1 07/18/2022    MCH 30.8 07/18/2022    MCHC 30.1 07/18/2022    RDW 15.3 (H) 07/18/2022    MPV 11.0 07/18/2022    BASOPCT 1 06/18/2022    LYMPHSABS 1.23 06/18/2022    MONOSABS 0.79 06/18/2022    NEUTROABS 6.25 06/18/2022    EOSABS 0.44 (H) 06/18/2022    BASOSABS 0.09 06/18/2022         DIAGNOSTIC TESTING:     No results found. IMPRESSION: Ms. Mercedes Cordero is a 65 y.o. female with    Diagnosis Orders   1. Duodenal ulcer  EGD        At present patient is stable. Hemoglobin improved to 13.5. No melena, hematochezia. Energy level satisfactory. To continue PPI therapy. Will need EGD to evaluate duodenal ulcer in the next 4 weeks. The Endoscopic procedure was explained to the patient in detail  The prep and NPO were explained  All the Risks, Benefits, and Alternatives were explained  Risk of Bleeding, Perforation and Cardio Respiratory risks were explained  her questions were answered  The procedure has been scheduled with the  in the office  Patient was asked to give us a call for any questions  The patient has verbalized understanding and agreement to this plan. The Endoscopic procedure was explained to the patient in detail  The prep and NPO were explained  All the Risks, Benefits, and Alternatives were explained  Risk of Bleeding, Perforation and Cardio Respiratory risks were explained  her questions were answered  The procedure has been scheduled with the  in the office  Patient was asked to give us a call for any questions  The patient has verbalized understanding and agreement to this plan.    The patient was counseled at length about the risks of elizabeth Covid-19 during their perioperative period and any recovery window from their procedure. The patient was made aware that elizabeth Covid-19  may worsen their prognosis for recovering from their procedure  and lend to a higher morbidity and/or mortality risk. All material risks, benefits, and reasonable alternatives including postponing the procedure were discussed. The patient does wish to proceed with the procedure at this time. Thank you for allowing me to participate in the care of Ms. Godwin Morrissey. For any further questions please do not hesitate to contact me. I have reviewed and agree with the ROS entered by the MA/VICK.          FRANTZ Turner    [facility]  Office #: [phone]

## 2022-07-20 NOTE — PROGRESS NOTES
Visit Information    Have you changed or started any medications since your last visit including any over-the-counter medicines, vitamins, or herbal medicines? no   Are you having any side effects from any of your medications? -  no  Have you stopped taking any of your medications? Is so, why? -  no    Have you seen any other physician or provider since your last visit? No  Have you had any other diagnostic tests since your last visit? Yes - Records Obtained  Have you been seen in the emergency room and/or had an admission to a hospital since we last saw you? yes  Have you had your routine dental cleaning in the past 6 months? yes -     Have you activated your [employer] account? If not, what are your barriers?  Yes     Patient Care Team:  Nelia Arias MD as PCP - General (Internal Medicine)  Nelia Arias MD as PCP - [facility]    Medical History Review  Past Medical, Family, and Social History reviewed and does contribute to the patient presenting condition    Health Maintenance   Topic Date Due    Annual Wellness Visit (AWV)  Never done    Lipids  Never done    Hepatitis C screen  Never done    DTaP/Tdap/Td vaccine (1 - Tdap) Never done    Shingles vaccine (1 of 2) Never done    DEXA (modify frequency per FRAX score)  Never done    Pneumococcal 65+ years Vaccine (2 - PCV) 08/12/2016    COVID-19 Vaccine (2 - Booster for Joanne series) 06/26/2021    Flu vaccine (1) 09/01/2022    Depression Screen  06/22/2023    Hepatitis A vaccine  Aged Out    Hepatitis B vaccine  Aged Out    Hib vaccine  Aged Out    Meningococcal (ACWY) vaccine  Aged Out

## 2022-07-26 RX ORDER — LISINOPRIL 10 MG/1
10 TABLET ORAL DAILY
Qty: 90 TABLET | Refills: 3 | Status: SHIPPED | OUTPATIENT
Start: 2022-07-26

## 2022-07-26 RX ORDER — LEVOTHYROXINE SODIUM 88 UG/1
88 TABLET ORAL DAILY
Qty: 90 TABLET | Refills: 3 | Status: SHIPPED | OUTPATIENT
Start: 2022-07-26

## 2022-07-26 RX ORDER — ATORVASTATIN CALCIUM 20 MG/1
20 TABLET, FILM COATED ORAL DAILY
Qty: 90 TABLET | Refills: 3 | Status: SHIPPED | OUTPATIENT
Start: 2022-07-26

## 2022-07-26 RX ORDER — AMLODIPINE BESYLATE 5 MG/1
5 TABLET ORAL DAILY
Qty: 90 TABLET | Refills: 3 | Status: SHIPPED | OUTPATIENT
Start: 2022-07-26

## 2022-07-26 RX ORDER — ALBUTEROL SULFATE 2.5 MG/3ML
2.5 SOLUTION RESPIRATORY (INHALATION) EVERY 6 HOURS PRN
Qty: 120 EACH | Refills: 3 | Status: SHIPPED | OUTPATIENT
Start: 2022-07-26

## 2022-08-23 ENCOUNTER — TELEPHONE (OUTPATIENT)
Dept: GASTROENTEROLOGY | Age: 77
End: 2022-08-23

## 2022-08-30 ENCOUNTER — HOSPITAL ENCOUNTER (OUTPATIENT)
Dept: PREADMISSION TESTING | Age: 77
Discharge: HOME | End: 2022-09-03

## 2022-08-30 NOTE — PROGRESS NOTES
Pre-op Instructions For Out-Patient Endoscopy Surgery    Surgery  EGD Tea 9-28 0730  Arrival 0600  Confirmed with patient  yes  Pt is vaccinated      Medication Instructions:  Please stop herbs and any supplements now (includes vitamins and minerals). MVi Vit D B complex melatonin    Please contact your surgeon and prescribing physician for pre-op instructions for any blood thinners. N/A    If you have inhalers/aerosol treatments at home, please use them the morning of your surgery and bring the inhalers with you to the hospital. Spiriva     Please take the following medications the morning of your surgery with a sip of water: Lisinopril, Synthroid, Norvasc    Surgery Instructions:  After midnight before surgery:  Do not eat or drink anything, including water, mints, gum, and hard candy. You may brush your teeth without swallowing. No smoking, chewing tobacco, or street drugs. Please shower or bathe before surgery. Please do not wear any cologne, lotion, powder, jewelry, piercings, perfume, makeup, nail polish, hair accessories, or hair spray on the day of surgery. Wear loose comfortable clothing. Leave your valuables at home. Bring a storage case for any glasses/contacts. An adult who is responsible for you MUST drive you home and should be with you for the first 24 hours after surgery. Daughter Michael Hampton     The Day of Surgery:  Arrive at [facility] Surgery Entrance at the time directed by your surgeon and check in at the desk. If you have a living will or healthcare power of , please bring a copy. N/A    You will be taken to the pre-op holding area where you will be prepared for surgery. A physical assessment will be performed by a nurse practitioner or house officer.   Your IV will be started and you will meet your anesthesiologist.    When you go to surgery, your family will be directed to the surgical waiting room, where the doctor should speak with them after your surgery. After surgery, you will be taken to the recovery room then when you are awake and stable you will go to the short stay unit for preparation to be discharged. Only your one designated person is allowed to come to short stay for your discharge. Pt verbalizes understanding of surgery/day of surgery instructions.

## 2022-09-13 RX ORDER — FLUTICASONE PROPIONATE 50 MCG
1 SPRAY, SUSPENSION (ML) NASAL DAILY
Qty: 32 G | Refills: 0 | OUTPATIENT
Start: 2022-09-13

## 2022-09-13 RX ORDER — AMOXICILLIN AND CLAVULANATE POTASSIUM 875; 125 MG/1; MG/1
1 TABLET, FILM COATED ORAL 2 TIMES DAILY
Qty: 14 TABLET | Refills: 0 | OUTPATIENT
Start: 2022-09-13 | End: 2022-09-20

## 2022-09-13 NOTE — TELEPHONE ENCOUNTER
Patient called with concerns of sinus pressure congestion and tinged mucus- spoke with DR Gabriella Jama and her requested we send Augmentin and flonase to pharmacy    Phoned into pharmacy- please sign to add to med list

## 2022-09-27 ENCOUNTER — ANESTHESIA EVENT (OUTPATIENT)
Dept: ENDOSCOPY | Age: 77
End: 2022-09-27
Payer: MEDICARE

## 2022-09-28 ENCOUNTER — HOSPITAL ENCOUNTER (OUTPATIENT)
Age: 77
Setting detail: OUTPATIENT SURGERY
Discharge: HOME | End: 2022-09-28
Attending: INTERNAL MEDICINE | Admitting: INTERNAL MEDICINE
Payer: MEDICARE

## 2022-09-28 ENCOUNTER — ANESTHESIA (OUTPATIENT)
Dept: ENDOSCOPY | Age: 77
End: 2022-09-28
Payer: MEDICARE

## 2022-09-28 VITALS
TEMPERATURE: 98.6 F | HEART RATE: 67 BPM | RESPIRATION RATE: 24 BRPM | SYSTOLIC BLOOD PRESSURE: 105 MMHG | OXYGEN SATURATION: 96 % | DIASTOLIC BLOOD PRESSURE: 54 MMHG | HEIGHT: 62 IN | WEIGHT: 120 LBS | BODY MASS INDEX: 22.08 KG/M2

## 2022-09-28 DIAGNOSIS — K26.9 DUODENAL ULCER: ICD-10-CM

## 2022-09-28 PROCEDURE — 6360000002 HC RX W HCPCS: Performed by: NURSE ANESTHETIST, CERTIFIED REGISTERED

## 2022-09-28 PROCEDURE — 88342 IMHCHEM/IMCYTCHM 1ST ANTB: CPT

## 2022-09-28 PROCEDURE — 43239 EGD BIOPSY SINGLE/MULTIPLE: CPT | Performed by: INTERNAL MEDICINE

## 2022-09-28 PROCEDURE — 3700000000 HC ANESTHESIA ATTENDED CARE: Performed by: INTERNAL MEDICINE

## 2022-09-28 PROCEDURE — 7100000010 HC PHASE II RECOVERY - FIRST 15 MIN: Performed by: INTERNAL MEDICINE

## 2022-09-28 PROCEDURE — 88305 TISSUE EXAM BY PATHOLOGIST: CPT

## 2022-09-28 PROCEDURE — 3609012400 HC EGD TRANSORAL BIOPSY SINGLE/MULTIPLE: Performed by: INTERNAL MEDICINE

## 2022-09-28 PROCEDURE — 7100000011 HC PHASE II RECOVERY - ADDTL 15 MIN: Performed by: INTERNAL MEDICINE

## 2022-09-28 PROCEDURE — 2580000003 HC RX 258: Performed by: ANESTHESIOLOGY

## 2022-09-28 PROCEDURE — 2709999900 HC NON-CHARGEABLE SUPPLY: Performed by: INTERNAL MEDICINE

## 2022-09-28 RX ORDER — LIDOCAINE HYDROCHLORIDE 10 MG/ML
1 INJECTION, SOLUTION EPIDURAL; INFILTRATION; INTRACAUDAL; PERINEURAL
Status: DISCONTINUED | OUTPATIENT
Start: 2022-09-28 | End: 2022-09-28 | Stop reason: HOSPADM

## 2022-09-28 RX ORDER — SODIUM CHLORIDE 9 MG/ML
INJECTION, SOLUTION INTRAVENOUS PRN
Status: CANCELLED | OUTPATIENT
Start: 2022-09-28

## 2022-09-28 RX ORDER — ONDANSETRON 2 MG/ML
4 INJECTION INTRAMUSCULAR; INTRAVENOUS
Status: CANCELLED | OUTPATIENT
Start: 2022-09-28 | End: 2022-09-29

## 2022-09-28 RX ORDER — SODIUM CHLORIDE, SODIUM LACTATE, POTASSIUM CHLORIDE, CALCIUM CHLORIDE 600; 310; 30; 20 MG/100ML; MG/100ML; MG/100ML; MG/100ML
INJECTION, SOLUTION INTRAVENOUS CONTINUOUS
Status: DISCONTINUED | OUTPATIENT
Start: 2022-09-28 | End: 2022-09-28 | Stop reason: HOSPADM

## 2022-09-28 RX ORDER — DIPHENHYDRAMINE HYDROCHLORIDE 50 MG/ML
12.5 INJECTION INTRAMUSCULAR; INTRAVENOUS
Status: CANCELLED | OUTPATIENT
Start: 2022-09-28 | End: 2022-09-29

## 2022-09-28 RX ORDER — SODIUM CHLORIDE 0.9 % (FLUSH) 0.9 %
5-40 SYRINGE (ML) INJECTION EVERY 12 HOURS SCHEDULED
Status: DISCONTINUED | OUTPATIENT
Start: 2022-09-28 | End: 2022-09-28 | Stop reason: HOSPADM

## 2022-09-28 RX ORDER — PROPOFOL 10 MG/ML
INJECTION, EMULSION INTRAVENOUS PRN
Status: DISCONTINUED | OUTPATIENT
Start: 2022-09-28 | End: 2022-09-28 | Stop reason: SDUPTHER

## 2022-09-28 RX ORDER — FENTANYL CITRATE 50 UG/ML
25 INJECTION, SOLUTION INTRAMUSCULAR; INTRAVENOUS EVERY 5 MIN PRN
Status: CANCELLED | OUTPATIENT
Start: 2022-09-28

## 2022-09-28 RX ORDER — SODIUM CHLORIDE 0.9 % (FLUSH) 0.9 %
5-40 SYRINGE (ML) INJECTION PRN
Status: DISCONTINUED | OUTPATIENT
Start: 2022-09-28 | End: 2022-09-28 | Stop reason: HOSPADM

## 2022-09-28 RX ORDER — SODIUM CHLORIDE 0.9 % (FLUSH) 0.9 %
5-40 SYRINGE (ML) INJECTION PRN
Status: CANCELLED | OUTPATIENT
Start: 2022-09-28

## 2022-09-28 RX ORDER — SODIUM CHLORIDE 9 MG/ML
INJECTION, SOLUTION INTRAVENOUS PRN
Status: DISCONTINUED | OUTPATIENT
Start: 2022-09-28 | End: 2022-09-28 | Stop reason: HOSPADM

## 2022-09-28 RX ORDER — FENTANYL CITRATE 50 UG/ML
50 INJECTION, SOLUTION INTRAMUSCULAR; INTRAVENOUS EVERY 5 MIN PRN
Status: CANCELLED | OUTPATIENT
Start: 2022-09-28

## 2022-09-28 RX ORDER — SODIUM CHLORIDE 0.9 % (FLUSH) 0.9 %
5-40 SYRINGE (ML) INJECTION EVERY 12 HOURS SCHEDULED
Status: CANCELLED | OUTPATIENT
Start: 2022-09-28

## 2022-09-28 RX ADMIN — SODIUM CHLORIDE, POTASSIUM CHLORIDE, SODIUM LACTATE AND CALCIUM CHLORIDE: 600; 310; 30; 20 INJECTION, SOLUTION INTRAVENOUS at 06:56

## 2022-09-28 RX ADMIN — PROPOFOL 20 MG: 10 INJECTION, EMULSION INTRAVENOUS at 07:48

## 2022-09-28 RX ADMIN — PROPOFOL 50 MG: 10 INJECTION, EMULSION INTRAVENOUS at 07:46

## 2022-09-28 RX ADMIN — PROPOFOL 50 MG: 10 INJECTION, EMULSION INTRAVENOUS at 07:43

## 2022-09-28 ASSESSMENT — ENCOUNTER SYMPTOMS
SHORTNESS OF BREATH: 1
CONSTIPATION: 0
DIARRHEA: 0
SINUS PRESSURE: 1
WHEEZING: 0
VOMITING: 0
COUGH: 0
ABDOMINAL PAIN: 1
TROUBLE SWALLOWING: 0
BACK PAIN: 0
SORE THROAT: 0
NAUSEA: 0

## 2022-09-28 ASSESSMENT — PAIN - FUNCTIONAL ASSESSMENT: PAIN_FUNCTIONAL_ASSESSMENT: 0-10

## 2022-09-28 NOTE — H&P
HISTORY and Chet[facility] [id number]       NAME:  Stefani Conway  MRN: [id number]   YOB: 1945   Date: 9/28/2022   Age: 65 y.o. Gender: female     COMPLAINT AND PRESENT HISTORY:   Stefani Conway is 65 y.o.,  female, undergoing EGD ESOPHAGOGASTRODUODENOSCOPY for DUODENAL ULCER. Stefani Conway is 65 y.o.,  female, undergoing here for EGD. Patient seen at the ED June 2022 due to fatigue, SOB. Pt severely anemic and had duodenal ulcer. Pt treated with Protonix, carafate. She followed up with GI in July 2022, see note below. Patient denies epigastric pains, no nausea and no vomiting. No diarrhea / constipation, no changes in the color, caliber or consistency of the stools. No fever or chills. She recently had URI and states she was coughing and has some left lower quadrant tenderness, intermittent and infrequent. She states it is dull 1/10 and does not radiate. No other associated symptoms. She reports recently she had URI, symptoms have improved. She states she took a COVID test and it was negative yesterday. No fever/chills. Per office visit from KAM Miller on 7/20/22:  Patient being seen for hospital, EGD follow-up. Patient was admitted 6/13/2022 for fatigue, shortness of breath. Found to have hemoglobin of 4. Patient had EGD that revealed acute ulceration involving the entire apex of the bulb towards the second part of the duodenum. No active bleeding, no identifiable visible vessel. Did have some oozing of blood. Patient was treated with aggressive PPI therapy, Carafate. Present patient is doing well. She denies any melanotic stools. Bright red blood per rectum. She has been on PPI therapy twice daily up until recently she was put on once daily by her PCP. Her hemoglobin has improved to 13.5. Denies any abdominal pain, epigastric pain, nausea, vomiting, dyspeptic symptoms. Has good appetite.      Bowel movements are satisfactory     ABD PAIN: slight pain in her right side which she attributes to a recent cold and coughing. CONSTIPATION: denies   DIARRHEA:no  BLOOD IN STOOL/BLACK, TARRY STOOLS: no  NAUSEA/VOMITING/HEMATEMESIS: no  FEVER/CHILLS: no  APPETITE CHANGE: per patient has been good  RECENT UNINTENDED WEIGHT LOSS: denies   DIFFICULTY SWALLOWING/PHARYNGITIS/VOICE CHANGE: no    Review of additional significant medical hx (see chart for additional detail, including current medications): She has hx of thyroid disease, HTN, HLD, COPD. Pt reports she is compliant with her medications. She is on Spiriva, lisinopril, atorvastatin, protonix, levothyroxine. Patient states they have been NPO since before midnight. Medications taken TODAY (with sip of water): Patient took levothyroxine, amlodipine, lisinopril. She also took Spiriva inhaler. She has not taken any vitamins x 1 week. Patient denies taking any anti-coagulants, including aspirin currently. Pertinent family hx: Denies family hx of esophageal and colon CA. Denies personal hx of blood clots. Denies personal hx of MRSA infection. Denies any personal or family hx of previous complications w/anesthesia.   PAST MEDICAL HISTORY     Past Medical History:   Diagnosis Date    COPD (chronic obstructive pulmonary disease) ([facility] [address]     Hyperlipidemia     Hypertension     Kidney stone     Thyroid disease        SURGICAL HISTORY       Past Surgical History:   Procedure Laterality Date    UPPER GASTROINTESTINAL ENDOSCOPY N/A 6/13/2022    EGD performed by Jacki Paulino MD at [facility] History     Socioeconomic History    Marital status: Single     Spouse name: None    Number of children: None    Years of education: None    Highest education level: None   Tobacco Use    Smoking status: Former     Packs/day: 0.50     Types: Cigarettes    Smokeless tobacco: Never    Tobacco comments:     using patches currently   Vaping Use    Vaping Use: Never used   Substance and Sexual Activity    Alcohol use: Yes     Alcohol/week: 2.0 standard drinks     Types: 2 Glasses of wine per week    Drug use: Never       REVIEW OF SYSTEMS    No Known Allergies    No current facility-administered medications on file prior to encounter. Current Outpatient Medications on File Prior to Encounter   Medication Sig Dispense Refill    simvastatin (ZOCOR) 40 MG tablet Take 40 mg by mouth in the morning. pantoprazole (PROTONIX) 40 MG tablet Take 1 tablet by mouth in the morning. 90 tablet 3    Melatonin 5 MG CAPS Take 3 mg by mouth daily      b complex vitamins capsule Take 1 capsule by mouth daily      Cholecalciferol (VITAMIN D3) 50 MCG (2000 UT) CAPS Take by mouth      Misc Natural Products (NEURIVA PO) Take by mouth      Acetaminophen (TYLENOL) 325 MG CAPS Take by mouth      Multiple Vitamins-Minerals (MULTIVITAMIN ADULTS 50+) TABS Take 1 tablet by mouth daily 100 tablet 3     (Notation: Medications listed above are not currently reconciled at the signing of this H&P note, to be reconciled in pre-op per RN)    Review of Systems   Constitutional:  Negative for appetite change, chills, fatigue and fever. HENT:  Positive for congestion, postnasal drip and sinus pressure. Negative for dental problem, hearing loss, sneezing, sore throat and trouble swallowing. Eyes:  Negative for visual disturbance. Respiratory:  Positive for shortness of breath (exertional shortness of breath). Negative for cough and wheezing. Cardiovascular:  Negative for chest pain and leg swelling. Gastrointestinal:  Positive for abdominal pain. Negative for constipation, diarrhea, nausea and vomiting. Genitourinary: Negative. Musculoskeletal:  Negative for arthralgias, back pain and neck pain. Skin:  Negative for rash and wound. Neurological:  Negative for dizziness, tremors, numbness and headaches. Hematological:  Bruises/bleeds easily. Psychiatric/Behavioral: Negative. GENERAL PHYSICAL EXAM     Vitals: Review current vital signs per RN flow sheet. GENERAL APPEARANCE:   Mara Velez is 65 y.o.,  female, thin, nourished, conscious, alert. Does not appear to be in any distress or pain at this time. Physical Exam  Constitutional:       Appearance: Normal appearance. HENT:      Head: Normocephalic and atraumatic. Right Ear: Hearing and external ear normal. No decreased hearing noted. Left Ear: Hearing and external ear normal. No decreased hearing noted. Nose: Nose normal.      Mouth/Throat:      Lips: Pink. Mouth: Mucous membranes are moist.      Dentition: Normal dentition. Pharynx: Oropharynx is clear. Uvula midline. Eyes:      General: Lids are normal. No scleral icterus. Conjunctiva/sclera: Conjunctivae normal.   Neck:      Trachea: Trachea and phonation normal.   Cardiovascular:      Rate and Rhythm: Normal rate and regular rhythm. Occasional Extrasystoles are present. Heart sounds: Normal heart sounds. No murmur heard. Pulmonary:      Effort: Pulmonary effort is normal.      Breath sounds: Examination of the right-lower field reveals decreased breath sounds. Examination of the left-lower field reveals decreased breath sounds. Decreased breath sounds present. No wheezing, rhonchi or rales. Comments: Upper lobes clear, slightly diminished air entry to bilateral lower lobes. Abdominal:      General: Abdomen is flat. Bowel sounds are normal. There is no distension. Palpations: Abdomen is soft. Tenderness: There is abdominal tenderness in the left lower quadrant. Musculoskeletal:      Cervical back: Neck supple. Skin:     General: Skin is warm and dry. Neurological:      Mental Status: She is alert. Psychiatric:         Behavior: Behavior is cooperative.        RECENT LAB WORK     Lab Results   Component Value Date     07/18/2022    K 4.7 07/18/2022    CL 98 07/18/2022    CO2 23

## 2022-09-28 NOTE — ANESTHESIA POSTPROCEDURE EVALUATION
Department of Anesthesiology  Postprocedure Note    Patient: Nelly Pan  MRN: [id number]  YOB: 1945  Date of evaluation: 9/28/2022      Procedure Summary     Date: 09/28/22 Room / Location: [facility] ENDO 04 / [facility] ENDO    Anesthesia Start: 8212 Anesthesia Stop: 7898    Procedure: EGD BIOPSY (Esophagus) Diagnosis:       Duodenal ulcer      (DUODENAL ULCER)    Surgeons: Nathalia Cleveland MD Responsible Provider: Stephen Lozano MD    Anesthesia Type: general ASA Status: 3          Anesthesia Type: No value filed.     Gaby Phase I:      Gaby Phase II: Gaby Score: 10      Anesthesia Post Evaluation    Comments: POST- ANESTHESIA EVALUATION       Pt Name: Nelly Pan  MRN: [id number]  YOB: 1945  Date of evaluation: 9/28/2022  Time:  11:10 AM      BP (!) 105/54   Pulse 67   Temp 98.6 °F (37 °C)   Resp 24   Ht 5' 2\" (1.575 m)   Wt 120 lb (54.4 kg)   SpO2 96%   BMI 21.95 kg/m²      Consciousness Level  Awake  Cardiopulmonary Status  Stable  Pain Adequately Treated YES  Nausea / Vomiting  NO  Adequate Hydration  YES  Anesthesia Related Complications NONE      Electronically signed by Stephen Lozano MD on 9/28/2022 at 11:10 AM

## 2022-09-28 NOTE — ANESTHESIA PRE PROCEDURE
Department of Anesthesiology  Preprocedure Note       Name:  Josue Hernandez   Age:  65 y.o.  :  1945                                          MRN:  [id number]         Date:  2022      Surgeon: Ashok Fletcher):  Alec Castañeda MD    Procedure: Procedure(s):  EGD ESOPHAGOGASTRODUODENOSCOPY    Medications prior to admission:   Prior to Admission medications    Medication Sig Start Date End Date Taking? Authorizing Provider   fluticasone (FLONASE) 50 MCG/ACT nasal spray 1 spray by Each Nostril route daily 22   Jason Peace MD   tiotropium (SPIRIVA) 18 MCG inhalation capsule Inhale 1 capsule into the lungs daily 22   Jason Peace MD   lisinopril (PRINIVIL;ZESTRIL) 10 MG tablet Take 1 tablet by mouth in the morning. 22   Jason Peace MD   levothyroxine (SYNTHROID) 88 MCG tablet Take 1 tablet by mouth in the morning. 22   Jason Peace MD   atorvastatin (LIPITOR) 20 MG tablet Take 1 tablet by mouth in the morning. 22   Jason Peace MD   amLODIPine (NORVASC) 5 MG tablet Take 1 tablet by mouth in the morning. 22   Jason Peace MD   albuterol (PROVENTIL) (2.5 MG/3ML) 0.083% nebulizer solution Take 3 mLs by nebulization every 6 hours as needed for Wheezing 22   Jason Peace MD   simvastatin (ZOCOR) 40 MG tablet Take 40 mg by mouth in the morning.   Patient not taking: Reported on 2022    Historical Provider, MD   pantoprazole (PROTONIX) 40 MG tablet Take 1 tablet by mouth in the morning. 22   Jason Peace MD   Melatonin 5 MG CAPS Take 3 mg by mouth daily    Historical Provider, MD   b complex vitamins capsule Take 1 capsule by mouth daily    Historical Provider, MD   Cholecalciferol (VITAMIN D3) 50 MCG ( UT) CAPS Take by mouth    Historical Provider, MD   Misc Natural Products (NEURIVA PO) Take by mouth    Historical Provider, MD   Acetaminophen (TYLENOL) 325 MG CAPS Take by mouth    Historical Provider, MD   Multiple Vitamins-Minerals (MULTIVITAMIN ADULTS 50+) TABS Take 1 tablet by mouth daily 6/22/22   Velia Lopez MD       Current medications:    Current Facility-Administered Medications   Medication Dose Route Frequency Provider Last Rate Last Admin    lidocaine PF 1 % injection 1 mL  1 mL IntraDERmal Once PRN Chapincito Byrne MD        lactated ringers infusion   IntraVENous Continuous Chapincito Byrne  mL/hr at 09/28/22 0656 New Bag at 09/28/22 0656    sodium chloride flush 0.9 % injection 5-40 mL  5-40 mL IntraVENous 2 times per day Chapincito Byrne MD        sodium chloride flush 0.9 % injection 5-40 mL  5-40 mL IntraVENous PRN Chapincito Byrne MD        0.9 % sodium chloride infusion   IntraVENous PRN Chapincito Byrne MD           Allergies:  No Known Allergies    Problem List:    Patient Active Problem List   Diagnosis Code    Anemia D64.9    Hypothyroidism E03.9    COPD (chronic obstructive pulmonary disease) ([facility] [address] J44.9    Upper GI bleeding K92.2    Duodenal ulcer K26.9    Acute midline low back pain without sciatica M54.50    Spondylolysis with spondylolisthesis M43.00, M43.10    Spinal stenosis, lumbar region, without neurogenic claudication M48.061    Hyperlipidemia E78.5    Tobacco abuse Z72.0    Superficial phlebitis left arm I80.9       Past Medical History:        Diagnosis Date    COPD (chronic obstructive pulmonary disease) ([facility] [address]     Hyperlipidemia     Hypertension     Kidney stone     Thyroid disease        Past Surgical History:        Procedure Laterality Date    UPPER GASTROINTESTINAL ENDOSCOPY N/A 6/13/2022    EGD performed by Adrianne Diamond MD at [address] [place] [address]       Social History:    Social History     Tobacco Use    Smoking status: Former     Packs/day: 0.50     Types: Cigarettes    Smokeless tobacco: Never    Tobacco comments:     using patches currently   Substance Use Topics    Alcohol use:  Yes     Alcohol/week: 2.0 standard drinks     Types: 2 Glasses of wine per week Counseling given: Not Answered  Tobacco comments: using patches currently      Vital Signs (Current):   Vitals:    08/30/22 0812 09/28/22 0633   BP:  (!) 121/54   Pulse:  74   Resp:  18   Temp:  97.5 °F (36.4 °C)   TempSrc:  Infrared   SpO2:  92%   Weight: 120 lb (54.4 kg) 120 lb (54.4 kg)   Height: 5' 2\" (1.575 m) 5' 2\" (1.575 m)                                              BP Readings from Last 3 Encounters:   09/28/22 (!) 121/54   07/20/22 (!) 156/74   07/20/22 118/68       NPO Status: Time of last liquid consumption: 2300 (sip with meds this am)                        Time of last solid consumption: 1900                        Date of last liquid consumption: 09/27/22                        Date of last solid food consumption: 09/27/22    BMI:   Wt Readings from Last 3 Encounters:   09/28/22 120 lb (54.4 kg)   07/20/22 120 lb (54.4 kg)   07/20/22 120 lb (54.4 kg)     Body mass index is 21.95 kg/m². CBC:   Lab Results   Component Value Date/Time    WBC 8.4 07/18/2022 08:07 AM    RBC 4.39 07/18/2022 08:07 AM    HGB 13.5 07/18/2022 08:07 AM    HCT 44.8 07/18/2022 08:07 AM    .1 07/18/2022 08:07 AM    RDW 15.3 07/18/2022 08:07 AM     07/18/2022 08:07 AM       CMP:   Lab Results   Component Value Date/Time     07/18/2022 08:07 AM    K 4.7 07/18/2022 08:07 AM    CL 98 07/18/2022 08:07 AM    CO2 23 07/18/2022 08:07 AM    BUN 20 07/18/2022 08:07 AM    CREATININE 0.85 07/18/2022 08:07 AM    GFRAA >60 07/18/2022 08:07 AM    LABGLOM >60 07/18/2022 08:07 AM    GLUCOSE 100 07/18/2022 08:07 AM    PROT 7.4 07/18/2022 08:07 AM    CALCIUM 9.9 07/18/2022 08:07 AM    BILITOT 0.32 07/18/2022 08:07 AM    ALKPHOS 91 07/18/2022 08:07 AM    AST 17 07/18/2022 08:07 AM    ALT 18 07/18/2022 08:07 AM       POC Tests: No results for input(s): POCGLU, POCNA, POCK, POCCL, POCBUN, POCHEMO, POCHCT in the last 72 hours.     Coags:   Lab Results   Component Value Date/Time    PROTIME 14.2 06/12/2022 01:35 PM    INR 1.1 06/12/2022 01:35 PM    APTT 36.0 06/12/2022 01:35 PM       HCG (If Applicable): No results found for: PREGTESTUR, PREGSERUM, HCG, HCGQUANT     ABGs: No results found for: PHART, PO2ART, NJH5SAJ, MMI2QNF, BEART, I0DTDGTU     Type & Screen (If Applicable):  No results found for: LABABO, LABRH    Drug/Infectious Status (If Applicable):  No results found for: HIV, HEPCAB    COVID-19 Screening (If Applicable):   Lab Results   Component Value Date/Time    COVID19 Not Detected 06/12/2022 02:14 PM           Anesthesia Evaluation  Patient summary reviewed and Nursing notes reviewed no history of anesthetic complications:   Airway: Mallampati: II  TM distance: >3 FB   Neck ROM: full  Mouth opening: > = 3 FB   Dental: normal exam         Pulmonary:normal exam  breath sounds clear to auscultation  (+) COPD:                             Cardiovascular:    (+) hypertension:, hyperlipidemia        Rhythm: regular  Rate: normal                    Neuro/Psych:   (+) neuromuscular disease:,              ROS comment: Spinal stenosis GI/Hepatic/Renal:   (+) PUD, renal disease: kidney stones,           Endo/Other:    (+) hypothyroidism::., .                 Abdominal:             Vascular: negative vascular ROS. Other Findings:           Anesthesia Plan      general     ASA 3       Induction: intravenous. MIPS: Prophylactic antiemetics administered. Anesthetic plan and risks discussed with patient. Plan discussed with CRNA.                     Mark Lewis MD   9/28/2022

## 2022-09-28 NOTE — OP NOTE
ESOPHAGOGASTRODUODENOSCOPY   ( EGD )  DATE OF PROCEDURE: 9/28/2022     SURGEON: Ora Dakin, MD    ASSISTANT: None    PREOPERATIVE DIAGNOSIS patient had acute duodenal ulcer with bleeding. Could not evaluate the ulcer satisfactorily in the past.  Procedure performed to evaluate the ulcer, healing of the ulcer, rule out malignancy. POSTOPERATIVE DIAGNOSIS: Performed duodenal bulb especially in the apex and postbulbar area. No stricture seen. No signs of malignancy. Ulcer appears to be almost healed. OPERATION: Upper GI endoscopy with Biopsy    ANESTHESIA: MAC    ESTIMATED BLOOD LOSS: None    COMPLICATIONS: None. SPECIMENS:  Was Obtained: Random biopsies from the body and antrum of the stomach to check for H. pylori    HISTORY: The patient is a 65y.o. year old female with history of above preop diagnosis. I recommended esophagogastroduodenoscopy with possible biopsy and I explained the risk, benefits, expected outcome, and alternatives to the procedure. Risks included but are not limited to bleeding, infection, respiratory distress, hypotension, and perforation of the esophagus, stomach, or duodenum. Patient understands and is in agreement. PROCEDURE: The patient was given IV conscious sedation. The patient's SPO2 remained above 90% throughout the procedure. Cetacaine spray given. Patient placed in left lateral position. Olympus  videogastroscope was inserted orally under vision into the esophagus without difficulty and advanced into the stomach then through the pylorus up to the second part of duodenum. Findings:    Retropharyngeal area was grossly normal appearing    Esophagus: normal.  No signs of peptic esophagitis. Squamocolumnar junction is at about 35 cm. No hiatal hernia.     Stomach:    Fundus and Cardia Examined in Retroflexed View: normal    Body: normal    Antrum: normal  Multiple random biopsies taken from body and antrum of the stomach to evaluate for H. pylori  Duodenum:     Descending: normal    Bulb: abnormal: Deformed duodenal bulb. No stricture seen towards the postbulbar area. Ulcer appears to be almost healed. While withdrawing the scope the above findings were verified and the scope was removed. The patient has tolerated the procedure without unusual events.          Recommendations/Plan:   F/U Biopsies  F/U In Office as instructed  Discussed with the family                   Electronically signed by Abida Morales MD  on 9/28/2022 at 7:50 AM

## 2022-09-28 NOTE — DISCHARGE INSTRUCTIONS
To continue PPI therapy    To see in the office in the next 3 to 4 weeks    [address] EGD    In order to continue your care at home, please follow the instructions below. For General Anesthesia:  Do not drink any alcoholic beverages or make any legal or important decisions for 24 hours. Do not drive or operate machinery for 24 hours. You may return to work after 24 hours. Diet    Drink plenty of fluids after surgery, unless you are on a fluid restriction. After general anesthesia, start out eating lightly (broth, soup, bread, etc.) advancing as tolerated to your usual diet. Try to avoid spicy or greasy/fatty foods for 48 hours. Avoid milk/milk product for several hours. If your gag reflex has not returned but you are able to swallow, cut food into small bites, chew food thoroughly and drink fluids carefully for the next 2 hours. Medications  Take medications as ordered by your surgeon. Activities  Limit your activities for 24 hours. You may shower. Call your surgeon for the following: For an oral temperature (by mouth) is 101 degrees or higher, chills or excessive sweating. You have increasing and progressive bleeding or drainage from surgery area. Persistent nausea or vomiting  Vomiting blood (may be red or black)  Rectal bleeding (may be red or black)  Severe sore throat or neck pain  Redness or swelling at the IV site. If you are unable to urinate within 8 hours of surgery. For any questions or concerns you may have.

## 2022-09-30 LAB — SURGICAL PATHOLOGY REPORT: NORMAL

## 2022-10-09 ENCOUNTER — OFFICE VISIT (OUTPATIENT)
Dept: FAMILY MEDICINE CLINIC | Age: 77
End: 2022-10-09
Payer: MEDICARE

## 2022-10-09 VITALS
HEIGHT: 62 IN | HEART RATE: 103 BPM | TEMPERATURE: 101.1 F | SYSTOLIC BLOOD PRESSURE: 117 MMHG | BODY MASS INDEX: 22.08 KG/M2 | WEIGHT: 120 LBS | DIASTOLIC BLOOD PRESSURE: 71 MMHG | OXYGEN SATURATION: 91 %

## 2022-10-09 DIAGNOSIS — J44.1 COPD EXACERBATION (HCC): Primary | ICD-10-CM

## 2022-10-09 PROCEDURE — 1036F TOBACCO NON-USER: CPT | Performed by: FAMILY MEDICINE

## 2022-10-09 PROCEDURE — 1090F PRES/ABSN URINE INCON ASSESS: CPT | Performed by: FAMILY MEDICINE

## 2022-10-09 PROCEDURE — 3023F SPIROM DOC REV: CPT | Performed by: FAMILY MEDICINE

## 2022-10-09 PROCEDURE — G8484 FLU IMMUNIZE NO ADMIN: HCPCS | Performed by: FAMILY MEDICINE

## 2022-10-09 PROCEDURE — G8400 PT W/DXA NO RESULTS DOC: HCPCS | Performed by: FAMILY MEDICINE

## 2022-10-09 PROCEDURE — 1123F ACP DISCUSS/DSCN MKR DOCD: CPT | Performed by: FAMILY MEDICINE

## 2022-10-09 PROCEDURE — 99214 OFFICE O/P EST MOD 30 MIN: CPT | Performed by: FAMILY MEDICINE

## 2022-10-09 PROCEDURE — G8420 CALC BMI NORM PARAMETERS: HCPCS | Performed by: FAMILY MEDICINE

## 2022-10-09 PROCEDURE — G8427 DOCREV CUR MEDS BY ELIG CLIN: HCPCS | Performed by: FAMILY MEDICINE

## 2022-10-09 RX ORDER — PREDNISONE 20 MG/1
40 TABLET ORAL DAILY
Qty: 10 TABLET | Refills: 0 | Status: SHIPPED | OUTPATIENT
Start: 2022-10-09 | End: 2022-10-19 | Stop reason: SDUPTHER

## 2022-10-09 RX ORDER — AZITHROMYCIN 250 MG/1
TABLET, FILM COATED ORAL
Qty: 1 PACKET | Refills: 0 | Status: SHIPPED | OUTPATIENT
Start: 2022-10-09 | End: 2022-10-19 | Stop reason: SDUPTHER

## 2022-10-09 RX ORDER — BENZONATATE 100 MG/1
100 CAPSULE ORAL 3 TIMES DAILY PRN
Qty: 30 CAPSULE | Refills: 0 | Status: SHIPPED | OUTPATIENT
Start: 2022-10-09 | End: 2022-11-08

## 2022-10-09 RX ORDER — ALBUTEROL SULFATE 90 UG/1
AEROSOL, METERED RESPIRATORY (INHALATION)
COMMUNITY
Start: 2022-07-24 | End: 2022-11-03 | Stop reason: SDUPTHER

## 2022-10-09 SDOH — ECONOMIC STABILITY: FOOD INSECURITY: WITHIN THE PAST 12 MONTHS, YOU WORRIED THAT YOUR FOOD WOULD RUN OUT BEFORE YOU GOT MONEY TO BUY MORE.: NEVER TRUE

## 2022-10-09 SDOH — ECONOMIC STABILITY: FOOD INSECURITY: WITHIN THE PAST 12 MONTHS, THE FOOD YOU BOUGHT JUST DIDN'T LAST AND YOU DIDN'T HAVE MONEY TO GET MORE.: NEVER TRUE

## 2022-10-09 ASSESSMENT — ENCOUNTER SYMPTOMS
SORE THROAT: 0
SHORTNESS OF BREATH: 1
WHEEZING: 0
COUGH: 1

## 2022-10-09 ASSESSMENT — SOCIAL DETERMINANTS OF HEALTH (SDOH): HOW HARD IS IT FOR YOU TO PAY FOR THE VERY BASICS LIKE FOOD, HOUSING, MEDICAL CARE, AND HEATING?: NOT HARD AT ALL

## 2022-10-09 NOTE — PROGRESS NOTES
Mg Horne MD  [address] [facility] WALK-IN FAMILY MEDICINE  [address] [place] [address] [place] [address] Rd [address]  Dept: [phone]    Derrick Boss is a 65 y.o. female who presents today for hermedical conditions/complaints as noted below. Derrick Boss is here today c/o Cough (Patient is having chest congestion and cough, patient is coughing up yellow/brown in color. Patient has been having these symptoms for 2 days now, she has been taking mucinex and it is not helping.  Patient had an at-home covid test done today and it was negative)       HPI:     HPI    Patient presents to the walk-in clinic with her son-in-law for evaluation of cough  Patient is an ex-smoker, history of COPD, on Spiriva, albuterol, Flonase, mucinex  Symptoms began 2 days ago, endorses coughing fits associated with some shortness of breath and productive sputum, no wheezing  Endorses congestion and chills, not checking temperature at home  Denies sore throat, ear pain, nausea, vomiting, diarrhea  Multiple sick contacts at home  Home COVID test negative, declines repeat COVID testing today    Patient Active Problem List   Diagnosis    Anemia    Hypothyroidism    COPD (chronic obstructive pulmonary disease) ([address]    Upper GI bleeding    Duodenal ulcer    Acute midline low back pain without sciatica    Spondylolysis with spondylolisthesis    Spinal stenosis, lumbar region, without neurogenic claudication    Hyperlipidemia    Tobacco abuse    Superficial phlebitis left arm       Past Medical History:   Diagnosis Date    COPD (chronic obstructive pulmonary disease) ([address]     Hyperlipidemia     Hypertension     Kidney stone     Thyroid disease      Past Surgical History:   Procedure Laterality Date    UPPER GASTROINTESTINAL ENDOSCOPY N/A 6/13/2022    EGD performed by Sam Stephenson MD at [address] [place] [address] 9/28/2022    EGD BIOPSY performed by Jaja Mcgregor Kavya Padilla MD at [facility] ENDO     Family History   Problem Relation Age of Onset    No Known Problems Mother     No Known Problems Father      Social History     Tobacco Use    Smoking status: Former     Packs/day: 0.50     Types: Cigarettes     Start date:      Quit date: 2022     Years since quittin.4    Smokeless tobacco: Never    Tobacco comments:     using patches currently   Vaping Use    Vaping Use: Never used   Substance Use Topics    Alcohol use:  Yes     Alcohol/week: 2.0 standard drinks     Types: 2 Glasses of wine per week    Drug use: Never       Current Outpatient Medications:     albuterol sulfate HFA (PROVENTIL;VENTOLIN;PROAIR) 108 (90 Base) MCG/ACT inhaler, , Disp: , Rfl:     predniSONE (DELTASONE) 20 MG tablet, Take 2 tablets by mouth daily for 5 days, Disp: 10 tablet, Rfl: 0    azithromycin (ZITHROMAX) 250 MG tablet, Take 2 tabs (500 mg) on Day 1, and take 1 tab (250 mg) on days 2 through 5., Disp: 1 packet, Rfl: 0    benzonatate (TESSALON PERLES) 100 MG capsule, Take 1 capsule by mouth 3 times daily as needed for Cough, Disp: 30 capsule, Rfl: 0    fluticasone (FLONASE) 50 MCG/ACT nasal spray, 1 spray by Each Nostril route daily, Disp: 32 g, Rfl: 0    tiotropium (SPIRIVA) 18 MCG inhalation capsule, Inhale 1 capsule into the lungs daily, Disp: 30 capsule, Rfl: 3    lisinopril (PRINIVIL;ZESTRIL) 10 MG tablet, Take 1 tablet by mouth in the morning., Disp: 90 tablet, Rfl: 3    levothyroxine (SYNTHROID) 88 MCG tablet, Take 1 tablet by mouth in the morning., Disp: 90 tablet, Rfl: 3    atorvastatin (LIPITOR) 20 MG tablet, Take 1 tablet by mouth in the morning., Disp: 90 tablet, Rfl: 3    amLODIPine (NORVASC) 5 MG tablet, Take 1 tablet by mouth in the morning., Disp: 90 tablet, Rfl: 3    albuterol (PROVENTIL) (2.5 MG/3ML) 0.083% nebulizer solution, Take 3 mLs by nebulization every 6 hours as needed for Wheezing, Disp: 120 each, Rfl: 3    simvastatin (ZOCOR) 40 MG tablet, Take 40 mg by mouth daily, Disp: , Rfl:     pantoprazole (PROTONIX) 40 MG tablet, Take 1 tablet by mouth in the morning., Disp: 90 tablet, Rfl: 3    Melatonin 5 MG CAPS, Take 3 mg by mouth daily, Disp: , Rfl:     b complex vitamins capsule, Take 1 capsule by mouth daily, Disp: , Rfl:     Cholecalciferol (VITAMIN D3) 50 MCG (2000 UT) CAPS, Take by mouth, Disp: , Rfl:     Misc Natural Products (NEURIVA PO), Take by mouth, Disp: , Rfl:     Acetaminophen (TYLENOL) 325 MG CAPS, Take by mouth, Disp: , Rfl:     Multiple Vitamins-Minerals (MULTIVITAMIN ADULTS 50+) TABS, Take 1 tablet by mouth daily, Disp: 100 tablet, Rfl: 3    Subjective:     Review of Systems   Constitutional:  Positive for chills and fever. HENT:  Positive for congestion. Negative for sore throat. Respiratory:  Positive for cough and shortness of breath. Negative for wheezing. Objective:     /71   Pulse (!) 103   Temp (!) 101.1 °F (38.4 °C) (Temporal)   Ht 5' 2\" (1.575 m)   Wt 120 lb (54.4 kg)   SpO2 91%   BMI 21.95 kg/m²     Physical Exam  Constitutional:       Appearance: Normal appearance. She is well-developed. She is not ill-appearing, toxic-appearing or diaphoretic. HENT:      Head: Normocephalic. Mouth/Throat:      Pharynx: No oropharyngeal exudate or posterior oropharyngeal erythema. Eyes:      General:         Right eye: No discharge. Left eye: No discharge. Conjunctiva/sclera: Conjunctivae normal.   Cardiovascular:      Rate and Rhythm: Normal rate and regular rhythm. Heart sounds: Normal heart sounds. No murmur heard. Pulmonary:      Effort: Pulmonary effort is normal. No respiratory distress. Breath sounds: Normal breath sounds. No wheezing. Lymphadenopathy:      Cervical: No cervical adenopathy. Neurological:      Mental Status: She is alert. Psychiatric:         Behavior: Behavior normal.         Thought Content: Thought content normal.         Judgment: Judgment normal.       Assessment & Plan:      1.  COPD

## 2022-10-18 DIAGNOSIS — J44.1 COPD EXACERBATION (HCC): ICD-10-CM

## 2022-10-19 RX ORDER — AZITHROMYCIN 250 MG/1
TABLET, FILM COATED ORAL
Qty: 1 PACKET | Refills: 0 | Status: SHIPPED | OUTPATIENT
Start: 2022-10-19

## 2022-10-19 RX ORDER — PREDNISONE 20 MG/1
40 TABLET ORAL DAILY
Qty: 10 TABLET | Refills: 0 | Status: SHIPPED | OUTPATIENT
Start: 2022-10-19 | End: 2022-10-24

## 2022-10-19 RX ORDER — CEPHALEXIN 500 MG/1
500 CAPSULE ORAL 2 TIMES DAILY
Qty: 14 CAPSULE | Refills: 0 | Status: SHIPPED | OUTPATIENT
Start: 2022-10-19 | End: 2022-10-26

## 2022-11-03 RX ORDER — ALBUTEROL SULFATE 90 UG/1
2 AEROSOL, METERED RESPIRATORY (INHALATION) EVERY 4 HOURS PRN
Qty: 18 G | Refills: 1 | Status: SHIPPED | OUTPATIENT
Start: 2022-11-03 | End: 2022-11-22

## 2022-11-22 ENCOUNTER — OFFICE VISIT (OUTPATIENT)
Dept: INTERNAL MEDICINE CLINIC | Age: 77
End: 2022-11-22
Payer: MEDICARE

## 2022-11-22 VITALS
BODY MASS INDEX: 22.08 KG/M2 | SYSTOLIC BLOOD PRESSURE: 132 MMHG | HEART RATE: 87 BPM | TEMPERATURE: 97.5 F | HEIGHT: 62 IN | WEIGHT: 120 LBS | OXYGEN SATURATION: 98 % | DIASTOLIC BLOOD PRESSURE: 80 MMHG

## 2022-11-22 DIAGNOSIS — J06.9 VIRAL URI: ICD-10-CM

## 2022-11-22 DIAGNOSIS — E03.4 HYPOTHYROIDISM DUE TO ACQUIRED ATROPHY OF THYROID: Chronic | ICD-10-CM

## 2022-11-22 DIAGNOSIS — Z13.220 SCREENING FOR HYPERLIPIDEMIA: ICD-10-CM

## 2022-11-22 DIAGNOSIS — E78.2 MIXED HYPERLIPIDEMIA: ICD-10-CM

## 2022-11-22 DIAGNOSIS — Z78.0 POST-MENOPAUSAL: ICD-10-CM

## 2022-11-22 DIAGNOSIS — Z87.19 HISTORY OF GI BLEED: ICD-10-CM

## 2022-11-22 DIAGNOSIS — K26.9 DUODENAL ULCER: ICD-10-CM

## 2022-11-22 DIAGNOSIS — J44.9 CHRONIC OBSTRUCTIVE PULMONARY DISEASE, UNSPECIFIED COPD TYPE (HCC): Primary | Chronic | ICD-10-CM

## 2022-11-22 PROCEDURE — G8484 FLU IMMUNIZE NO ADMIN: HCPCS | Performed by: INTERNAL MEDICINE

## 2022-11-22 PROCEDURE — 1036F TOBACCO NON-USER: CPT | Performed by: INTERNAL MEDICINE

## 2022-11-22 PROCEDURE — G8420 CALC BMI NORM PARAMETERS: HCPCS | Performed by: INTERNAL MEDICINE

## 2022-11-22 PROCEDURE — 3023F SPIROM DOC REV: CPT | Performed by: INTERNAL MEDICINE

## 2022-11-22 PROCEDURE — 99214 OFFICE O/P EST MOD 30 MIN: CPT | Performed by: INTERNAL MEDICINE

## 2022-11-22 PROCEDURE — 1123F ACP DISCUSS/DSCN MKR DOCD: CPT | Performed by: INTERNAL MEDICINE

## 2022-11-22 PROCEDURE — 1090F PRES/ABSN URINE INCON ASSESS: CPT | Performed by: INTERNAL MEDICINE

## 2022-11-22 PROCEDURE — G8427 DOCREV CUR MEDS BY ELIG CLIN: HCPCS | Performed by: INTERNAL MEDICINE

## 2022-11-22 PROCEDURE — G8400 PT W/DXA NO RESULTS DOC: HCPCS | Performed by: INTERNAL MEDICINE

## 2022-11-22 RX ORDER — ALBUTEROL SULFATE 90 UG/1
2 AEROSOL, METERED RESPIRATORY (INHALATION) EVERY 4 HOURS PRN
Qty: 18 G | Refills: 3 | Status: SHIPPED | OUTPATIENT
Start: 2022-11-22

## 2022-11-22 RX ORDER — FLUTICASONE PROPIONATE 50 MCG
1 SPRAY, SUSPENSION (ML) NASAL DAILY
Qty: 32 G | Refills: 3 | Status: SHIPPED | OUTPATIENT
Start: 2022-11-22

## 2022-11-22 ASSESSMENT — ENCOUNTER SYMPTOMS
COUGH: 1
ABDOMINAL PAIN: 0

## 2022-11-22 NOTE — PROGRESS NOTES
Visit Information    Have you changed or started any medications since your last visit including any over-the-counter medicines, vitamins, or herbal medicines? no   Have you stopped taking any of your medications? Is so, why? -  no  Are you having any side effects from any of your medications? - no    Have you seen any other physician or provider since your last visit? yes -    Have you had any other diagnostic tests since your last visit?  no   Have you been seen in the emergency room and/or had an admission in a hospital since we last saw you?  yes - [address] on 9/28/22   Have you had your routine dental cleaning in the past 6 months?  no     Do you have an active MyChart account? If no, what is the barrier?   Yes    Patient Care Team:  Dyan Weiner MD as PCP - General (Internal Medicine)  Dyan Weiner MD as PCP - [facility] Provider    Medical History Review  Past Medical, Family, and Social History reviewed and does contribute to the patient presenting condition    Health Maintenance   Topic Date Due    Lipids  Never done    Hepatitis C screen  Never done    DTaP/Tdap/Td vaccine (1 - Tdap) Never done    Shingles vaccine (1 of 2) Never done    DEXA (modify frequency per FRAX score)  Never done    Pneumococcal 65+ years Vaccine (2 - PCV) 08/12/2016    COVID-19 Vaccine (2 - Booster for Joanne series) 06/26/2021    Annual Wellness Visit (AWV)  Never done    Flu vaccine (1) 08/01/2022    Depression Screen  06/22/2023    Hepatitis A vaccine  Aged Out    Hib vaccine  Aged Out    Meningococcal (ACWY) vaccine  Aged Out

## 2022-11-22 NOTE — PROGRESS NOTES
OFFICE VISIT  PROGRESS NOTE         Date of patient's visit: 11/22/22  Patient's Name:  Mauricio Kamara  YOB: 1945       Patient Care Team:  Maria Elena Guan MD as PCP - General (Internal Medicine)  Maria Elena Guan [facility]        SUBJECTIVE     HISTORY           History of present illness     Pertinent details  added to ,       Chief Complaint   Patient presents with    Follow-Up from Hospital     Follow from [address] from 9/28/22    Head Congestion     Started 2 days ago     Cough     Dry cough          Encounter Diagnoses   Name Primary? Chronic obstructive pulmonary disease, unspecified COPD type ([facility] Yes    Post-menopausal     Screening for hyperlipidemia     Hypothyroidism due to acquired atrophy of thyroid     Viral URI     History of GI bleed     Mixed hyperlipidemia     Duodenal ulcer         Symptom / problem          --history obtained from patient. -   This is a 65year old female with a past medical history of hypertension, hypothyroidism, hyperlipidemia, COPD and PUD here today for a follow up appointment. Discharge summary from 06/12/2022 and 09/28/2022 reviewed. Patient has a history of acute blood loss anemia (hemoglobin of 4.0) back in 06/2022 due to a large bleeding duodenal ulcer which was secondary to chronic NSAID use. Patient was treated at that time and discharged on aggressive therapy after hemoglobin became stable. She had a repeat EGD on 09/28/2022 by GI Dr. Jose Miguel Main which was unremarkable with almost full healing of the prior duodenal ulcer. Her last hemoglobin checked was in 06/2022 and was 9.1 at that time. Today she is doing well. Denies any melena or hematochezia or abdominal pain, nausea or vomiting.  She has a small cold at this time for the past 2 days with complaints of a mild non productive cough and sinus congestion. Denies any fever, chills, or thick sputum production. MEDICATIONS:      Current Outpatient Medications   Medication Sig Dispense Refill    tiotropium (SPIRIVA) 18 MCG inhalation capsule Inhale 1 capsule into the lungs daily 90 capsule 3    fluticasone (FLONASE) 50 MCG/ACT nasal spray 1 spray by Each Nostril route daily 32 g 3    albuterol sulfate HFA (PROVENTIL;VENTOLIN;PROAIR) 108 (90 Base) MCG/ACT inhaler Inhale 2 puffs into the lungs every 4 hours as needed for Wheezing 18 g 3    lisinopril (PRINIVIL;ZESTRIL) 10 MG tablet Take 1 tablet by mouth in the morning. 90 tablet 3    levothyroxine (SYNTHROID) 88 MCG tablet Take 1 tablet by mouth in the morning. 90 tablet 3    atorvastatin (LIPITOR) 20 MG tablet Take 1 tablet by mouth in the morning. 90 tablet 3    amLODIPine (NORVASC) 5 MG tablet Take 1 tablet by mouth in the morning. 90 tablet 3    albuterol (PROVENTIL) (2.5 MG/3ML) 0.083% nebulizer solution Take 3 mLs by nebulization every 6 hours as needed for Wheezing 120 each 3    simvastatin (ZOCOR) 40 MG tablet Take 40 mg by mouth daily      pantoprazole (PROTONIX) 40 MG tablet Take 1 tablet by mouth in the morning. 90 tablet 3    Melatonin 5 MG CAPS Take 3 mg by mouth daily      b complex vitamins capsule Take 1 capsule by mouth daily      Cholecalciferol (VITAMIN D3) 50 MCG (2000 UT) CAPS Take by mouth      Misc Natural Products (NEURIVA PO) Take by mouth      Acetaminophen (TYLENOL) 325 MG CAPS Take by mouth      Multiple Vitamins-Minerals (MULTIVITAMIN ADULTS 50+) TABS Take 1 tablet by mouth daily 100 tablet 3     No current facility-administered medications for this visit. ALLERGIES:    No Known Allergies    SOCIAL HISTORY   Reviewed and no change from previous record. Renold Lefort  reports that she quit smoking about 6 months ago. Her smoking use included cigarettes.  She started smoking about 61 years ago. She smoked an average of .5 packs per day. She has never used smokeless tobacco.    FAMILY HISTORY:    Reviewed and No change from previous visit    REVIEW OF SYSTEMS:    Review of Systems   Constitutional:  Negative for chills and fever. HENT:  Positive for congestion. Respiratory:  Positive for cough. Cardiovascular:  Negative for chest pain. Gastrointestinal:  Negative for abdominal pain. Endocrine: Negative for polyuria. Genitourinary:  Negative for difficulty urinating. Musculoskeletal:  Negative for arthralgias. Skin:  Negative for rash. Neurological:  Negative for weakness. All other systems reviewed and are negative. OBJECTIVE      Physical exam        Physical Exam   Vitals:  /80 (Site: Left Upper Arm, Position: Sitting, Cuff Size: Medium Adult)   Pulse 87   Temp 97.5 °F (36.4 °C) (Temporal)   Ht 5' 2\" (1.575 m)   Wt 120 lb (54.4 kg)   SpO2 98%   BMI 21.95 kg/m²                 Body mass index is 21.95 kg/m². General Appearance:   Alert , CO-OPERATIVE ,                 Skin:                             No rash or erythema  HEENT ;                           Head                        Symmetrical , within normal limits                                    No tenderness over frontal  sinuses. No tenderness over maxillary sinuses. Eye                           Conjunctiva normal ,, sclera non-icteric . Ear                           External ear ok . Hearing okay , no discharge from ears . [] Otoscopy findings wnl     Nose                         w.n.l. Throat                       Oropharynx  findings wnl               Neck:                            No mass , no thyroid enlargement                                           Pulmonary/Chest:        Clear to auscultation bilaterally .                                          No wheezes, rales or Diagnosis --  STOMACH, BIOPSY:  -GASTRIC ANTRAL/BODY TYPE MUCOSA WITH MODERATE CHRONIC GASTRITIS.  -H. PYLORI STAIN IS NEGATIVE. CONTROL REACTS AS EXPECTED. Patricio Sanchez M.D.  **Electronically Signed Out**         des/9/29/2022       Clinical Information  Pre-op Diagnosis:  DUODENAL ULCER   Operative Findings:  STOMACH BIOPSY FOR H. PYLORI   Operation Performed:  EGD BIOPSY     Source of Specimen  A: STOMACH BIOPSY FOR H. PYLORI    Gross Description  \"DANYELLE ROSS, STOMACH BIOPSY\" Three tan-white tissue fragments from  0.2 to 0.4 cm and are 0.6 x 0.3 x 0.2 cm in aggregate. Entirely 1cs. ep tm      Microscopic Description  Microscopic examination performed. H. pylori immunostain performed  because no organisms apparent on H&E.    SURGICAL PATHOLOGY CONSULTATION       Patient Name: Goran Silva: [id number]  Path Number: [id number]    [address] [place] [address]. [place], [address]  [phone]  Fax: [phone] INCLUDING BMI REVIEWED WITH PATIENT  Labs reviewed as noted above   Discussed with patient        ASSESSMENT / Blaire Cortescel was seen today for follow-up from hospital, head congestion and cough. Diagnoses and all orders for this visit:    Chronic obstructive pulmonary disease, unspecified COPD type ([address]  - Well controlled  -     tiotropium (SPIRIVA) 18 MCG inhalation capsule; Inhale 1 capsule into the lungs daily  -     albuterol sulfate HFA (PROVENTIL;VENTOLIN;PROAIR) 108 (90 Base) MCG/ACT inhaler; Inhale 2 puffs into the lungs every 4 hours as needed for Wheezing    Post-menopausal  -     DEXA Bone Density Axial Skeleton; Future    Screening for hyperlipidemia  -     Lipid Panel;  Future    Hypothyroidism due to acquired atrophy of thyroid  - Well controlled    Viral URI  -     fluticasone (FLONASE) 50 MCG/ACT nasal spray; 1 spray by Each Nostril route daily    History of GI bleed  - In 06/2022  - Last hemoglobin checked on 07/2022 was improved at 13.5  - EGD done on 09/2022 showing no active bleeding and almost full resolution of duodenal ulcer    Mixed hyperlipidemia  - Lipid Panel    Duodenal ulcer  - EGD done on 09/2022 showing no active bleeding and almost full resolution of duodenal ulcer  - Discussed importance of avoidance of any use of NSAID's  - Continue with Protonix 40 mg daily  - Continue timely follow up with GI           SALIENT  ACTIONS TODAYS VISIT       Discharge summary from 06/12/2022 and 09/28/2022 reviewed. Patient has a history of acute blood loss anemia (hemoglobin of 4.0) back in 06/2022 due to a large bleeding duodenal ulcer which was secondary to chronic NSAID use. Patient was treated at that time and discharged on aggressive therapy after hemoglobin became stable. She had a repeat EGD on 09/28/2022 by GI Dr. Bernstein Friday which was unremarkable with almost full healing of the prior duodenal ulcer. Her last hemoglobin checked was in 06/2022 and was 9.1 at that time. Discussed use, benefit, and side effects of prescribed medications. [x] yes    All patient questions answered. Patient voiced understanding.      Patient given educational materials - see patient instructions  [] yes         Medications Discontinued During This Encounter   Medication Reason    azithromycin (ZITHROMAX) 250 MG tablet LIST CLEANUP    tiotropium (SPIRIVA) 18 MCG inhalation capsule REORDER    fluticasone (FLONASE) 50 MCG/ACT nasal spray REORDER    albuterol sulfate HFA (PROVENTIL;VENTOLIN;PROAIR) 108 (90 Base) MCG/ACT inhaler         Orders Placed This Encounter   Procedures    DEXA Bone Density Axial Skeleton     Standing Status:   Future     Standing Expiration Date:   11/22/2023    Lipid Panel     Standing Status:   Future     Standing Expiration Date:   11/22/2023     Order Specific Question:   Is Patient Fasting?/# of Hours     Answer:   No          There are no Patient Instructions on file for this visit. Medication List            Accurate as of November 22, 2022  3:29 PM. If you have any questions, ask your nurse or doctor. CONTINUE taking these medications      * albuterol (2.5 MG/3ML) 0.083% nebulizer solution  Commonly known as: PROVENTIL  Take 3 mLs by nebulization every 6 hours as needed for Wheezing     * albuterol sulfate  (90 Base) MCG/ACT inhaler  Commonly known as: PROVENTIL;VENTOLIN;PROAIR  Inhale 2 puffs into the lungs every 4 hours as needed for Wheezing     amLODIPine 5 MG tablet  Commonly known as: NORVASC  Take 1 tablet by mouth in the morning. atorvastatin 20 MG tablet  Commonly known as: LIPITOR  Take 1 tablet by mouth in the morning. b complex vitamins capsule     fluticasone 50 MCG/ACT nasal spray  Commonly known as: FLONASE  1 spray by Each Nostril route daily     levothyroxine 88 MCG tablet  Commonly known as: SYNTHROID  Take 1 tablet by mouth in the morning. lisinopril 10 MG tablet  Commonly known as: PRINIVIL;ZESTRIL  Take 1 tablet by mouth in the morning. Melatonin 5 MG Caps     Multivitamin Adults 50+ Tabs  Take 1 tablet by mouth daily     NEURIVA PO     pantoprazole 40 MG tablet  Commonly known as: PROTONIX  Take 1 tablet by mouth in the morning. simvastatin 40 MG tablet  Commonly known as: ZOCOR     tiotropium 18 MCG inhalation capsule  Commonly known as: SPIRIVA  Inhale 1 capsule into the lungs daily     Tylenol 325 MG Caps  Generic drug: Acetaminophen     Vitamin D3 50 MCG (2000 UT) Caps           * This list has 2 medication(s) that are the same as other medications prescribed for you. Read the directions carefully, and ask your doctor or other care provider to review them with you.                    Where to Get Your Medications        These medications were sent to [facility], [address] [place] [address] Jeannie Duenas 53 - F [phone]  [address] [place] [id number]      Phone: [phone]   albuterol sulfate  (90 Base) MCG/ACT inhaler  fluticasone 50 MCG/ACT nasal spray  tiotropium 18 MCG inhalation capsule             FOLLOW UP  PLANS     Return in about 6 months (around 5/22/2023) for follow up for chronic disease management. MD EDWINA Asencio [address].    Phone [phone]   Fax: [phone]  Answering Service: [phone]

## 2022-12-02 DIAGNOSIS — J44.9 CHRONIC OBSTRUCTIVE PULMONARY DISEASE, UNSPECIFIED COPD TYPE (HCC): Chronic | ICD-10-CM

## 2022-12-02 RX ORDER — ALBUTEROL SULFATE 90 UG/1
2 AEROSOL, METERED RESPIRATORY (INHALATION) EVERY 4 HOURS PRN
Qty: 18 G | Refills: 3 | OUTPATIENT
Start: 2022-12-02

## 2022-12-07 DIAGNOSIS — J44.9 CHRONIC OBSTRUCTIVE PULMONARY DISEASE, UNSPECIFIED COPD TYPE (HCC): Chronic | ICD-10-CM

## 2022-12-07 RX ORDER — ALBUTEROL SULFATE 90 UG/1
2 AEROSOL, METERED RESPIRATORY (INHALATION) EVERY 4 HOURS PRN
Qty: 18 G | Refills: 3 | OUTPATIENT
Start: 2022-12-07

## 2022-12-16 DIAGNOSIS — J44.9 CHRONIC OBSTRUCTIVE PULMONARY DISEASE, UNSPECIFIED COPD TYPE (HCC): Chronic | ICD-10-CM

## 2022-12-16 RX ORDER — ALBUTEROL SULFATE 90 UG/1
2 AEROSOL, METERED RESPIRATORY (INHALATION) EVERY 4 HOURS PRN
Qty: 18 G | Refills: 3 | Status: SHIPPED | OUTPATIENT
Start: 2022-12-16

## 2022-12-16 NOTE — TELEPHONE ENCOUNTER
LOV- 11/22/22  No upcoming appointments Topical Clindamycin Pregnancy And Lactation Text: This medication is Pregnancy Category B and is considered safe during pregnancy. It is unknown if it is excreted in breast milk.

## 2022-12-16 NOTE — TELEPHONE ENCOUNTER
----- Message from [place] sent at 12/16/2022 10:44 AM EST -----  Subject: Refill Request    QUESTIONS  Name of Medication? albuterol sulfate HFA (PROVENTIL;VENTOLIN;PROAIR) 108   (90 Base) MCG/ACT inhaler  Patient-reported dosage and instructions? 90 BASE   How many days do you have left? 0  Preferred Pharmacy? [facility] phone number (if available)? [phone]  Additional Information for Provider? Pt is asking for her pcp to send this   medication over to this pharmacy. Please call pt back. ---------------------------------------------------------------------------  --------------  Celi PEREZ  What is the best way for the office to contact you? Do not leave any   message, patient will call back for answer  Preferred Call Back Phone Number? [phone]  ---------------------------------------------------------------------------  --------------  SCRIPT ANSWERS  Relationship to Patient?  Self

## 2023-01-22 ENCOUNTER — HOSPITAL ENCOUNTER (INPATIENT)
Age: 78
LOS: 1 days | Discharge: HOME | DRG: 190 | End: 2023-01-23
Attending: EMERGENCY MEDICINE | Admitting: INTERNAL MEDICINE
Payer: MEDICARE

## 2023-01-22 ENCOUNTER — APPOINTMENT (OUTPATIENT)
Dept: CT IMAGING | Age: 78
DRG: 190 | End: 2023-01-22
Payer: MEDICARE

## 2023-01-22 ENCOUNTER — APPOINTMENT (OUTPATIENT)
Dept: GENERAL RADIOLOGY | Age: 78
DRG: 190 | End: 2023-01-22
Payer: MEDICARE

## 2023-01-22 DIAGNOSIS — J44.1 COPD EXACERBATION (HCC): Primary | ICD-10-CM

## 2023-01-22 DIAGNOSIS — E03.4 HYPOTHYROIDISM DUE TO ACQUIRED ATROPHY OF THYROID: Chronic | ICD-10-CM

## 2023-01-22 LAB
ABSOLUTE EOS #: 0.2 K/UL (ref 0–0.4)
ABSOLUTE LYMPH #: 2.2 K/UL (ref 1–4.8)
ABSOLUTE MONO #: 0.8 K/UL (ref 0.1–1.3)
ANION GAP SERPL CALCULATED.3IONS-SCNC: 13 MMOL/L (ref 9–17)
BASOPHILS # BLD: 1 % (ref 0–2)
BASOPHILS ABSOLUTE: 0.1 K/UL (ref 0–0.2)
BUN BLDV-MCNC: 16 MG/DL (ref 8–23)
CALCIUM SERPL-MCNC: 9.8 MG/DL (ref 8.6–10.4)
CHLORIDE BLD-SCNC: 92 MMOL/L (ref 98–107)
CO2: 24 MMOL/L (ref 20–31)
CREAT SERPL-MCNC: 0.78 MG/DL (ref 0.5–0.9)
D-DIMER QUANTITATIVE: 0.56 MG/L FEU (ref 0–0.59)
EOSINOPHILS RELATIVE PERCENT: 1 % (ref 0–4)
GFR SERPL CREATININE-BSD FRML MDRD: >60 ML/MIN/1.73M2
GLUCOSE BLD-MCNC: 112 MG/DL (ref 70–99)
HCT VFR BLD CALC: 41.7 % (ref 36–46)
HEMOGLOBIN: 14.6 G/DL (ref 12–16)
INFLUENZA A: NOT DETECTED
INFLUENZA B: NOT DETECTED
LYMPHOCYTES # BLD: 18 % (ref 24–44)
MCH RBC QN AUTO: 32.2 PG (ref 26–34)
MCHC RBC AUTO-ENTMCNC: 35 G/DL (ref 31–37)
MCV RBC AUTO: 92.1 FL (ref 80–100)
MONOCYTES # BLD: 7 % (ref 1–7)
PDW BLD-RTO: 13.4 % (ref 11.5–14.9)
PLATELET # BLD: 249 K/UL (ref 150–450)
PMV BLD AUTO: 8.2 FL (ref 6–12)
POTASSIUM SERPL-SCNC: 4.3 MMOL/L (ref 3.7–5.3)
PRO-BNP: 223 PG/ML
RBC # BLD: 4.52 M/UL (ref 4–5.2)
SARS-COV-2 RNA, RT PCR: NOT DETECTED
SEG NEUTROPHILS: 73 % (ref 36–66)
SEGMENTED NEUTROPHILS ABSOLUTE COUNT: 8.8 K/UL (ref 1.3–9.1)
SODIUM BLD-SCNC: 129 MMOL/L (ref 135–144)
SOURCE: NORMAL
SPECIMEN DESCRIPTION: NORMAL
THYROXINE, FREE: 2.05 NG/DL (ref 0.93–1.7)
TROPONIN, HIGH SENSITIVITY: 11 NG/L (ref 0–14)
TROPONIN, HIGH SENSITIVITY: 11 NG/L (ref 0–14)
TSH SERPL DL<=0.05 MIU/L-ACNC: 0.28 UIU/ML (ref 0.3–5)
WBC # BLD: 12.1 K/UL (ref 3.5–11)

## 2023-01-22 PROCEDURE — 94640 AIRWAY INHALATION TREATMENT: CPT

## 2023-01-22 PROCEDURE — 2060000000 HC ICU INTERMEDIATE R&B

## 2023-01-22 PROCEDURE — 87636 SARSCOV2 & INF A&B AMP PRB: CPT

## 2023-01-22 PROCEDURE — 6370000000 HC RX 637 (ALT 250 FOR IP): Performed by: EMERGENCY MEDICINE

## 2023-01-22 PROCEDURE — 96375 TX/PRO/DX INJ NEW DRUG ADDON: CPT

## 2023-01-22 PROCEDURE — 83880 ASSAY OF NATRIURETIC PEPTIDE: CPT

## 2023-01-22 PROCEDURE — 6360000002 HC RX W HCPCS: Performed by: STUDENT IN AN ORGANIZED HEALTH CARE EDUCATION/TRAINING PROGRAM

## 2023-01-22 PROCEDURE — 84484 ASSAY OF TROPONIN QUANT: CPT

## 2023-01-22 PROCEDURE — 6360000004 HC RX CONTRAST MEDICATION: Performed by: EMERGENCY MEDICINE

## 2023-01-22 PROCEDURE — 71045 X-RAY EXAM CHEST 1 VIEW: CPT

## 2023-01-22 PROCEDURE — 2700000000 HC OXYGEN THERAPY PER DAY

## 2023-01-22 PROCEDURE — 2580000003 HC RX 258: Performed by: EMERGENCY MEDICINE

## 2023-01-22 PROCEDURE — 85025 COMPLETE CBC W/AUTO DIFF WBC: CPT

## 2023-01-22 PROCEDURE — 84443 ASSAY THYROID STIM HORMONE: CPT

## 2023-01-22 PROCEDURE — 96374 THER/PROPH/DIAG INJ IV PUSH: CPT

## 2023-01-22 PROCEDURE — 71260 CT THORAX DX C+: CPT | Performed by: EMERGENCY MEDICINE

## 2023-01-22 PROCEDURE — 6360000002 HC RX W HCPCS: Performed by: EMERGENCY MEDICINE

## 2023-01-22 PROCEDURE — 93005 ELECTROCARDIOGRAM TRACING: CPT | Performed by: EMERGENCY MEDICINE

## 2023-01-22 PROCEDURE — 84439 ASSAY OF FREE THYROXINE: CPT

## 2023-01-22 PROCEDURE — 85379 FIBRIN DEGRADATION QUANT: CPT

## 2023-01-22 PROCEDURE — 94761 N-INVAS EAR/PLS OXIMETRY MLT: CPT

## 2023-01-22 PROCEDURE — 99285 EMERGENCY DEPT VISIT HI MDM: CPT

## 2023-01-22 PROCEDURE — 80048 BASIC METABOLIC PNL TOTAL CA: CPT

## 2023-01-22 PROCEDURE — 36415 COLL VENOUS BLD VENIPUNCTURE: CPT

## 2023-01-22 RX ORDER — DIPHENHYDRAMINE HYDROCHLORIDE 50 MG/ML
25 INJECTION INTRAMUSCULAR; INTRAVENOUS ONCE
Status: COMPLETED | OUTPATIENT
Start: 2023-01-22 | End: 2023-01-22

## 2023-01-22 RX ORDER — KETOROLAC TROMETHAMINE 30 MG/ML
30 INJECTION, SOLUTION INTRAMUSCULAR; INTRAVENOUS ONCE
Status: COMPLETED | OUTPATIENT
Start: 2023-01-22 | End: 2023-01-22

## 2023-01-22 RX ORDER — IPRATROPIUM BROMIDE AND ALBUTEROL SULFATE 2.5; .5 MG/3ML; MG/3ML
1 SOLUTION RESPIRATORY (INHALATION) 4 TIMES DAILY
COMMUNITY

## 2023-01-22 RX ORDER — METHYLPREDNISOLONE SODIUM SUCCINATE 125 MG/2ML
125 INJECTION, POWDER, LYOPHILIZED, FOR SOLUTION INTRAMUSCULAR; INTRAVENOUS ONCE
Status: COMPLETED | OUTPATIENT
Start: 2023-01-22 | End: 2023-01-22

## 2023-01-22 RX ORDER — SODIUM CHLORIDE 0.9 % (FLUSH) 0.9 %
10 SYRINGE (ML) INJECTION PRN
Status: DISCONTINUED | OUTPATIENT
Start: 2023-01-22 | End: 2023-01-23 | Stop reason: HOSPADM

## 2023-01-22 RX ORDER — PROCHLORPERAZINE EDISYLATE 5 MG/ML
10 INJECTION INTRAMUSCULAR; INTRAVENOUS ONCE
Status: COMPLETED | OUTPATIENT
Start: 2023-01-22 | End: 2023-01-22

## 2023-01-22 RX ORDER — IPRATROPIUM BROMIDE AND ALBUTEROL SULFATE 2.5; .5 MG/3ML; MG/3ML
1 SOLUTION RESPIRATORY (INHALATION) EVERY 4 HOURS PRN
Status: DISCONTINUED | OUTPATIENT
Start: 2023-01-22 | End: 2023-01-23 | Stop reason: HOSPADM

## 2023-01-22 RX ORDER — 0.9 % SODIUM CHLORIDE 0.9 %
100 INTRAVENOUS SOLUTION INTRAVENOUS ONCE
Status: COMPLETED | OUTPATIENT
Start: 2023-01-22 | End: 2023-01-23

## 2023-01-22 RX ADMIN — SODIUM CHLORIDE, PRESERVATIVE FREE 10 ML: 5 INJECTION INTRAVENOUS at 21:48

## 2023-01-22 RX ADMIN — METHYLPREDNISOLONE SODIUM SUCCINATE 125 MG: 125 INJECTION, POWDER, FOR SOLUTION INTRAMUSCULAR; INTRAVENOUS at 22:08

## 2023-01-22 RX ADMIN — DIPHENHYDRAMINE HYDROCHLORIDE 25 MG: 50 INJECTION, SOLUTION INTRAMUSCULAR; INTRAVENOUS at 22:09

## 2023-01-22 RX ADMIN — IPRATROPIUM BROMIDE AND ALBUTEROL SULFATE 1 AMPULE: 2.5; .5 SOLUTION RESPIRATORY (INHALATION) at 22:34

## 2023-01-22 RX ADMIN — KETOROLAC TROMETHAMINE 30 MG: 30 INJECTION, SOLUTION INTRAMUSCULAR; INTRAVENOUS at 22:08

## 2023-01-22 RX ADMIN — IOPAMIDOL 75 ML: 755 INJECTION, SOLUTION INTRAVENOUS at 21:48

## 2023-01-22 RX ADMIN — PROCHLORPERAZINE EDISYLATE 10 MG: 5 INJECTION INTRAMUSCULAR; INTRAVENOUS at 22:09

## 2023-01-22 RX ADMIN — SODIUM CHLORIDE 100 ML: 9 INJECTION, SOLUTION INTRAVENOUS at 21:48

## 2023-01-22 ASSESSMENT — ENCOUNTER SYMPTOMS
COUGH: 0
VOMITING: 0
DIARRHEA: 0
SHORTNESS OF BREATH: 1
RHINORRHEA: 0
SORE THROAT: 0
NAUSEA: 0
ABDOMINAL PAIN: 0
WHEEZING: 0

## 2023-01-22 ASSESSMENT — PAIN DESCRIPTION - LOCATION: LOCATION: HEAD

## 2023-01-22 ASSESSMENT — PAIN - FUNCTIONAL ASSESSMENT: PAIN_FUNCTIONAL_ASSESSMENT: 0-10

## 2023-01-22 ASSESSMENT — PAIN SCALES - GENERAL: PAINLEVEL_OUTOF10: 8

## 2023-01-23 VITALS
TEMPERATURE: 97.9 F | SYSTOLIC BLOOD PRESSURE: 125 MMHG | OXYGEN SATURATION: 95 % | DIASTOLIC BLOOD PRESSURE: 81 MMHG | HEIGHT: 62 IN | BODY MASS INDEX: 22.08 KG/M2 | RESPIRATION RATE: 18 BRPM | WEIGHT: 120 LBS | HEART RATE: 102 BPM

## 2023-01-23 LAB
ANION GAP SERPL CALCULATED.3IONS-SCNC: 12 MMOL/L (ref 9–17)
ANION GAP SERPL CALCULATED.3IONS-SCNC: 14 MMOL/L (ref 9–17)
BUN BLDV-MCNC: 17 MG/DL (ref 8–23)
BUN BLDV-MCNC: 18 MG/DL (ref 8–23)
CALCIUM SERPL-MCNC: 9.5 MG/DL (ref 8.6–10.4)
CALCIUM SERPL-MCNC: 9.5 MG/DL (ref 8.6–10.4)
CHLORIDE BLD-SCNC: 92 MMOL/L (ref 98–107)
CHLORIDE BLD-SCNC: 93 MMOL/L (ref 98–107)
CO2: 21 MMOL/L (ref 20–31)
CO2: 21 MMOL/L (ref 20–31)
CREAT SERPL-MCNC: 0.85 MG/DL (ref 0.5–0.9)
CREAT SERPL-MCNC: 0.88 MG/DL (ref 0.5–0.9)
EKG ATRIAL RATE: 80 BPM
EKG P AXIS: 75 DEGREES
EKG P-R INTERVAL: 162 MS
EKG Q-T INTERVAL: 388 MS
EKG QRS DURATION: 70 MS
EKG QTC CALCULATION (BAZETT): 447 MS
EKG R AXIS: 65 DEGREES
EKG T AXIS: 75 DEGREES
EKG VENTRICULAR RATE: 80 BPM
GFR SERPL CREATININE-BSD FRML MDRD: >60 ML/MIN/1.73M2
GFR SERPL CREATININE-BSD FRML MDRD: >60 ML/MIN/1.73M2
GLUCOSE BLD-MCNC: 184 MG/DL (ref 70–99)
GLUCOSE BLD-MCNC: 195 MG/DL (ref 70–99)
HCT VFR BLD CALC: 43.6 % (ref 36–46)
HEMOGLOBIN: 14.5 G/DL (ref 12–16)
MCH RBC QN AUTO: 31.1 PG (ref 26–34)
MCHC RBC AUTO-ENTMCNC: 33.2 G/DL (ref 31–37)
MCV RBC AUTO: 93.9 FL (ref 80–100)
OSMOLALITY URINE: 258 MOSM/KG (ref 80–1300)
PDW BLD-RTO: 13.2 % (ref 11.5–14.9)
PLATELET # BLD: 229 K/UL (ref 150–450)
PMV BLD AUTO: 8.2 FL (ref 6–12)
POTASSIUM SERPL-SCNC: 5.1 MMOL/L (ref 3.7–5.3)
POTASSIUM SERPL-SCNC: 5.3 MMOL/L (ref 3.7–5.3)
POTASSIUM SERPL-SCNC: 5.6 MMOL/L (ref 3.7–5.3)
RBC # BLD: 4.65 M/UL (ref 4–5.2)
SERUM OSMOLALITY: 280 MOSM/KG (ref 275–295)
SODIUM BLD-SCNC: 126 MMOL/L (ref 135–144)
SODIUM BLD-SCNC: 127 MMOL/L (ref 135–144)
SODIUM BLD-SCNC: 129 MMOL/L (ref 135–144)
SODIUM BLD-SCNC: 132 MMOL/L (ref 135–144)
SODIUM,UR: 32 MMOL/L
WBC # BLD: 6.5 K/UL (ref 3.5–11)

## 2023-01-23 PROCEDURE — 85027 COMPLETE CBC AUTOMATED: CPT

## 2023-01-23 PROCEDURE — 2580000003 HC RX 258: Performed by: INTERNAL MEDICINE

## 2023-01-23 PROCEDURE — 99223 1ST HOSP IP/OBS HIGH 75: CPT | Performed by: INTERNAL MEDICINE

## 2023-01-23 PROCEDURE — 94761 N-INVAS EAR/PLS OXIMETRY MLT: CPT

## 2023-01-23 PROCEDURE — 84295 ASSAY OF SERUM SODIUM: CPT

## 2023-01-23 PROCEDURE — 2700000000 HC OXYGEN THERAPY PER DAY

## 2023-01-23 PROCEDURE — 84300 ASSAY OF URINE SODIUM: CPT

## 2023-01-23 PROCEDURE — 80048 BASIC METABOLIC PNL TOTAL CA: CPT

## 2023-01-23 PROCEDURE — 6360000002 HC RX W HCPCS: Performed by: INTERNAL MEDICINE

## 2023-01-23 PROCEDURE — 97166 OT EVAL MOD COMPLEX 45 MIN: CPT

## 2023-01-23 PROCEDURE — 6360000002 HC RX W HCPCS: Performed by: NURSE PRACTITIONER

## 2023-01-23 PROCEDURE — 6370000000 HC RX 637 (ALT 250 FOR IP): Performed by: NURSE PRACTITIONER

## 2023-01-23 PROCEDURE — 2580000003 HC RX 258: Performed by: NURSE PRACTITIONER

## 2023-01-23 PROCEDURE — 84132 ASSAY OF SERUM POTASSIUM: CPT

## 2023-01-23 PROCEDURE — 36415 COLL VENOUS BLD VENIPUNCTURE: CPT

## 2023-01-23 PROCEDURE — 97116 GAIT TRAINING THERAPY: CPT

## 2023-01-23 PROCEDURE — 97162 PT EVAL MOD COMPLEX 30 MIN: CPT

## 2023-01-23 PROCEDURE — 83935 ASSAY OF URINE OSMOLALITY: CPT

## 2023-01-23 PROCEDURE — 94640 AIRWAY INHALATION TREATMENT: CPT

## 2023-01-23 PROCEDURE — 6370000000 HC RX 637 (ALT 250 FOR IP): Performed by: EMERGENCY MEDICINE

## 2023-01-23 PROCEDURE — 83930 ASSAY OF BLOOD OSMOLALITY: CPT

## 2023-01-23 PROCEDURE — 93010 ELECTROCARDIOGRAM REPORT: CPT | Performed by: INTERNAL MEDICINE

## 2023-01-23 RX ORDER — 0.9 % SODIUM CHLORIDE 0.9 %
500 INTRAVENOUS SOLUTION INTRAVENOUS ONCE
Status: COMPLETED | OUTPATIENT
Start: 2023-01-23 | End: 2023-01-23

## 2023-01-23 RX ORDER — LANOLIN ALCOHOL/MO/W.PET/CERES
6 CREAM (GRAM) TOPICAL NIGHTLY PRN
Status: DISCONTINUED | OUTPATIENT
Start: 2023-01-23 | End: 2023-01-23 | Stop reason: HOSPADM

## 2023-01-23 RX ORDER — PANTOPRAZOLE SODIUM 40 MG/1
40 TABLET, DELAYED RELEASE ORAL DAILY
Status: DISCONTINUED | OUTPATIENT
Start: 2023-01-23 | End: 2023-01-23 | Stop reason: HOSPADM

## 2023-01-23 RX ORDER — IPRATROPIUM BROMIDE AND ALBUTEROL SULFATE 2.5; .5 MG/3ML; MG/3ML
1 SOLUTION RESPIRATORY (INHALATION)
Status: DISCONTINUED | OUTPATIENT
Start: 2023-01-23 | End: 2023-01-23 | Stop reason: HOSPADM

## 2023-01-23 RX ORDER — ACETAMINOPHEN 325 MG/1
650 TABLET ORAL EVERY 6 HOURS PRN
Status: DISCONTINUED | OUTPATIENT
Start: 2023-01-23 | End: 2023-01-23 | Stop reason: HOSPADM

## 2023-01-23 RX ORDER — LEVOTHYROXINE SODIUM 0.07 MG/1
75 TABLET ORAL DAILY
Qty: 30 TABLET | Refills: 5 | Status: SHIPPED | OUTPATIENT
Start: 2023-01-23

## 2023-01-23 RX ORDER — METHYLPREDNISOLONE SODIUM SUCCINATE 125 MG/2ML
60 INJECTION, POWDER, LYOPHILIZED, FOR SOLUTION INTRAMUSCULAR; INTRAVENOUS EVERY 6 HOURS
Status: DISCONTINUED | OUTPATIENT
Start: 2023-01-23 | End: 2023-01-23

## 2023-01-23 RX ORDER — LEVOTHYROXINE SODIUM 0.05 MG/1
50 TABLET ORAL DAILY
Status: DISCONTINUED | OUTPATIENT
Start: 2023-01-23 | End: 2023-01-23

## 2023-01-23 RX ORDER — ONDANSETRON 4 MG/1
4 TABLET, ORALLY DISINTEGRATING ORAL EVERY 8 HOURS PRN
Status: DISCONTINUED | OUTPATIENT
Start: 2023-01-23 | End: 2023-01-23 | Stop reason: HOSPADM

## 2023-01-23 RX ORDER — GUAIFENESIN 600 MG/1
1200 TABLET, EXTENDED RELEASE ORAL 2 TIMES DAILY
Qty: 40 TABLET | Refills: 0 | Status: SHIPPED | OUTPATIENT
Start: 2023-01-23

## 2023-01-23 RX ORDER — LEVOTHYROXINE SODIUM 88 UG/1
88 TABLET ORAL DAILY
Status: DISCONTINUED | OUTPATIENT
Start: 2023-01-23 | End: 2023-01-23

## 2023-01-23 RX ORDER — SODIUM CHLORIDE 0.9 % (FLUSH) 0.9 %
5-40 SYRINGE (ML) INJECTION EVERY 12 HOURS SCHEDULED
Status: DISCONTINUED | OUTPATIENT
Start: 2023-01-23 | End: 2023-01-23 | Stop reason: HOSPADM

## 2023-01-23 RX ORDER — SODIUM CHLORIDE 9 MG/ML
INJECTION, SOLUTION INTRAVENOUS PRN
Status: DISCONTINUED | OUTPATIENT
Start: 2023-01-23 | End: 2023-01-23 | Stop reason: HOSPADM

## 2023-01-23 RX ORDER — ATORVASTATIN CALCIUM 20 MG/1
20 TABLET, FILM COATED ORAL DAILY
Status: DISCONTINUED | OUTPATIENT
Start: 2023-01-23 | End: 2023-01-23 | Stop reason: HOSPADM

## 2023-01-23 RX ORDER — ALBUTEROL SULFATE 2.5 MG/3ML
2.5 SOLUTION RESPIRATORY (INHALATION)
Status: DISCONTINUED | OUTPATIENT
Start: 2023-01-23 | End: 2023-01-23 | Stop reason: HOSPADM

## 2023-01-23 RX ORDER — ENOXAPARIN SODIUM 100 MG/ML
40 INJECTION SUBCUTANEOUS DAILY
Status: DISCONTINUED | OUTPATIENT
Start: 2023-01-23 | End: 2023-01-23 | Stop reason: HOSPADM

## 2023-01-23 RX ORDER — PREDNISONE 20 MG/1
40 TABLET ORAL DAILY
Qty: 14 TABLET | Refills: 0 | Status: SHIPPED | OUTPATIENT
Start: 2023-01-23 | End: 2023-01-30

## 2023-01-23 RX ORDER — AMLODIPINE BESYLATE 5 MG/1
5 TABLET ORAL DAILY
Status: DISCONTINUED | OUTPATIENT
Start: 2023-01-23 | End: 2023-01-23 | Stop reason: HOSPADM

## 2023-01-23 RX ORDER — LEVOTHYROXINE SODIUM 0.07 MG/1
75 TABLET ORAL DAILY
Status: DISCONTINUED | OUTPATIENT
Start: 2023-01-24 | End: 2023-01-23 | Stop reason: HOSPADM

## 2023-01-23 RX ORDER — SODIUM CHLORIDE 0.9 % (FLUSH) 0.9 %
5-40 SYRINGE (ML) INJECTION PRN
Status: DISCONTINUED | OUTPATIENT
Start: 2023-01-23 | End: 2023-01-23 | Stop reason: HOSPADM

## 2023-01-23 RX ORDER — METHYLPREDNISOLONE SODIUM SUCCINATE 125 MG/2ML
60 INJECTION, POWDER, LYOPHILIZED, FOR SOLUTION INTRAMUSCULAR; INTRAVENOUS EVERY 8 HOURS
Status: DISCONTINUED | OUTPATIENT
Start: 2023-01-23 | End: 2023-01-23 | Stop reason: HOSPADM

## 2023-01-23 RX ORDER — GUAIFENESIN 600 MG/1
1200 TABLET, EXTENDED RELEASE ORAL 2 TIMES DAILY
Status: DISCONTINUED | OUTPATIENT
Start: 2023-01-23 | End: 2023-01-23 | Stop reason: HOSPADM

## 2023-01-23 RX ORDER — ACETAMINOPHEN 650 MG/1
650 SUPPOSITORY RECTAL EVERY 6 HOURS PRN
Status: DISCONTINUED | OUTPATIENT
Start: 2023-01-23 | End: 2023-01-23 | Stop reason: HOSPADM

## 2023-01-23 RX ORDER — LISINOPRIL 10 MG/1
10 TABLET ORAL DAILY
Status: DISCONTINUED | OUTPATIENT
Start: 2023-01-23 | End: 2023-01-23 | Stop reason: HOSPADM

## 2023-01-23 RX ORDER — SODIUM CHLORIDE 9 MG/ML
INJECTION, SOLUTION INTRAVENOUS CONTINUOUS
Status: DISCONTINUED | OUTPATIENT
Start: 2023-01-23 | End: 2023-01-23 | Stop reason: HOSPADM

## 2023-01-23 RX ORDER — ONDANSETRON 2 MG/ML
4 INJECTION INTRAMUSCULAR; INTRAVENOUS EVERY 6 HOURS PRN
Status: DISCONTINUED | OUTPATIENT
Start: 2023-01-23 | End: 2023-01-23 | Stop reason: HOSPADM

## 2023-01-23 RX ORDER — POLYETHYLENE GLYCOL 3350 17 G/17G
17 POWDER, FOR SOLUTION ORAL DAILY PRN
Status: DISCONTINUED | OUTPATIENT
Start: 2023-01-23 | End: 2023-01-23 | Stop reason: HOSPADM

## 2023-01-23 RX ADMIN — METHYLPREDNISOLONE SODIUM SUCCINATE 60 MG: 125 INJECTION, POWDER, FOR SOLUTION INTRAMUSCULAR; INTRAVENOUS at 09:51

## 2023-01-23 RX ADMIN — ATORVASTATIN CALCIUM 20 MG: 20 TABLET, FILM COATED ORAL at 09:57

## 2023-01-23 RX ADMIN — GUAIFENESIN 1200 MG: 600 TABLET, EXTENDED RELEASE ORAL at 00:36

## 2023-01-23 RX ADMIN — IPRATROPIUM BROMIDE AND ALBUTEROL SULFATE 1 AMPULE: 2.5; .5 SOLUTION RESPIRATORY (INHALATION) at 04:50

## 2023-01-23 RX ADMIN — IPRATROPIUM BROMIDE AND ALBUTEROL SULFATE 1 AMPULE: 2.5; .5 SOLUTION RESPIRATORY (INHALATION) at 16:11

## 2023-01-23 RX ADMIN — Medication 6 MG: at 00:38

## 2023-01-23 RX ADMIN — LEVOTHYROXINE SODIUM 88 MCG: 0.09 TABLET ORAL at 05:02

## 2023-01-23 RX ADMIN — IPRATROPIUM BROMIDE AND ALBUTEROL SULFATE 1 AMPULE: 2.5; .5 SOLUTION RESPIRATORY (INHALATION) at 07:34

## 2023-01-23 RX ADMIN — METHYLPREDNISOLONE SODIUM SUCCINATE 60 MG: 125 INJECTION, POWDER, FOR SOLUTION INTRAMUSCULAR; INTRAVENOUS at 14:28

## 2023-01-23 RX ADMIN — LISINOPRIL 10 MG: 10 TABLET ORAL at 09:56

## 2023-01-23 RX ADMIN — SODIUM CHLORIDE 500 ML: 9 INJECTION, SOLUTION INTRAVENOUS at 14:49

## 2023-01-23 RX ADMIN — METHYLPREDNISOLONE SODIUM SUCCINATE 60 MG: 125 INJECTION, POWDER, FOR SOLUTION INTRAMUSCULAR; INTRAVENOUS at 03:34

## 2023-01-23 RX ADMIN — IPRATROPIUM BROMIDE AND ALBUTEROL SULFATE 1 AMPULE: 2.5; .5 SOLUTION RESPIRATORY (INHALATION) at 11:00

## 2023-01-23 RX ADMIN — AMLODIPINE BESYLATE 5 MG: 5 TABLET ORAL at 09:56

## 2023-01-23 RX ADMIN — GUAIFENESIN 1200 MG: 600 TABLET, EXTENDED RELEASE ORAL at 09:56

## 2023-01-23 RX ADMIN — PANTOPRAZOLE SODIUM 40 MG: 40 TABLET, DELAYED RELEASE ORAL at 09:56

## 2023-01-23 RX ADMIN — SODIUM CHLORIDE: 9 INJECTION, SOLUTION INTRAVENOUS at 00:44

## 2023-01-23 NOTE — PROGRESS NOTES
Home Oxygen Evaluation    Room air SpO2 at Rest = 95%    Room air with exercise/exertion = 88%    SpO2 on prescribed O2 level at   2 LPM  at rest = 95%     with exercise/exertion = 93%    Nocturnal Oximetry with patient on room air recommended if the resting SpO2 is 89% to 95%.  (Requires additional order)

## 2023-01-23 NOTE — PLAN OF CARE
Problem: Discharge Planning  Goal: Discharge to home or other facility with appropriate resources  Outcome: Progressing     Problem: Pain  Goal: Verbalizes/displays adequate comfort level or baseline comfort level  Outcome: Progressing     Problem: Respiratory - Adult  Goal: Achieves optimal ventilation and oxygenation  Outcome: Progressing     Problem: Safety - Adult  Goal: Free from fall injury  Outcome: Progressing

## 2023-01-23 NOTE — PROGRESS NOTES
[address]   Occupational Therapy Evaluation  Date: 23  Patient Name: Radha Hogan       Room:   MRN: [id number]  Account: [de-identified]   : 1945  (73 y.o.) Gender: female     Discharge Recommendations:  Further Occupational Therapy is recommended upon facility discharge. Referring Practitioner: KAM Patten CNP  Diagnosis: COPD exacerbation      Treatment Diagnosis: Impaired self-care status. Past Medical History:  has a past medical history of COPD (chronic obstructive pulmonary disease) ([address] Hyperlipidemia, Hypertension, Kidney stone, and Thyroid disease. Past Surgical History:   has a past surgical history that includes Upper gastrointestinal endoscopy (N/A, 2022) and Upper gastrointestinal endoscopy (N/A, 2022). Restrictions  Restrictions/Precautions  Restrictions/Precautions: Up as Tolerated; Bed Alarm (O2 at 2 L, peripheral IV left arm)  Required Braces or Orthoses?: No      Vitals  Vitals  Heart Rate: (!) 101 (101 at rest, 107 post removing socks)  Heart Rate Source: Monitor  BP: 137/62  BP Location: Right upper arm  BP Method: Automatic  MAP (Calculated): 87  Resp: 16  SpO2: 93 %  O2 Device: Nasal cannula  Comment: 2 L     Subjective  Subjective: \"I use the push cart\" patient reports she ambulates at the store.   Subjective  Pain: denies      Social/Functional History  Social/Functional History  Lives With: Alone  Type of Home: House (townhouse)  Home Layout: Two level, Able to Live on Main level with bedroom/bathroom, Performs ADL's on one level  Home Access: Stairs to enter with rails  Entrance Stairs - Number of Steps: 2 w/ left  railsm in back, 2 in front w/o rail in front- uses back entrance  Entrance Stairs - Rails: Left  Bathroom Shower/Tub: Walk-in shower  Bathroom Toilet: Standard  Bathroom Equipment: Shower chair, Grab bars in shower, Hand-held shower  Home Equipment: Oxygen (O2 at 2 L PRN)  Has the patient had two or more falls in the past year or any fall with injury in the past year?: No  Receives Help From: Family  ADL Assistance: [address]: Needs assistance (dtr takes her grocery shopping, does her own cooking, cleaning and laundry)  Homemaking Responsibilities: Yes  Ambulation Assistance: Independent (no device)  Transfer Assistance: Independent  Active : No  Patient's  Info: dtr  Mode of Transportation: SUV  Occupation: Retired  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 Reports history of carpal tunnel)    ADL  Feeding: Setup  Grooming: Setup  UE Bathing: Supervision  LE Bathing: Stand by assistance  UE Dressing: Supervision  LE Dressing: Stand by assistance  Toileting: Stand by assistance  Additional Comments: ADL scores based on skilled observations and clinical reasoning unless otherwise noted. VCs for pursed lip breathing and therapeutic rest breaks as needed. Patient's ability to participate in self-care is currently limited due to decreased activity tolerance. Patient's oxygen saturation monitored throughout session. Initially, patient's oxygen saturation was 95% at rest on 2L. Nasal cannula removed during therapy evaluation as patient was 95% on 2L and does not wear all the time at home. 90% post removing socks on RA, increases to 93% with pursed lip breathing. 88% after ambulation, increases to 92% with pursed lip breathing. Oxygen via NC 2L donned at end of session. RN notified.          UE Function  LUE AROM (degrees)  LUE AROM : WFL  Left Hand AROM (degrees)  Left Hand AROM: WFL  Tone LUE  LUE Tone: Normotonic  LUE Strength  Gross LUE Strength: WFL  L Hand General: 4/5  LUE Strength Comment: Grossly 4/5    RUE AROM (degrees)  RUE AROM : WFL  Right Hand AROM (degrees)  Right Hand AROM: WFL  Tone RUE  RUE Tone: Normotonic  RUE Strength  Gross RUE Strength: WFL  R Hand General: 4/5  RUE Strength Comment: Grossly 4/5         Fine Motor Skills/Coordination  Coordination  Movements Are Fluid And Coordinated: Yes              Bed Mobility  Bed mobility  Supine to Sit: Stand by assistance    Balance  Balance  Sitting Balance: Supervision  Standing Balance: Stand by assistance       Transfers  Transfers  Sit to stand: Stand by assistance  Stand to sit: Stand by assistance  Transfer Comments: patient seated in chair at end of session    Functional Mobility  Functional - Mobility Device: No device  Activity:  (in room and hallway)  Assist Level: Stand by assistance    Assessment  Assessment  Performance deficits / Impairments: Decreased functional mobility , Decreased ADL status, Decreased strength, Decreased endurance, Decreased balance, Decreased high-level IADLs  Treatment Diagnosis: Impaired self-care status. Prognosis: Good  Decision Making: Medium Complexity  Discharge Recommendations: Patient would benefit from continued therapy after discharge    Activity Tolerance  Activity Tolerance: Patient Tolerated treatment well    Safety Devices  Type of Devices:  All fall risk precautions in place, Call light within reach, Chair alarm in place, Gait belt, Left in chair, Patient at risk for falls, Nurse notified    Patient Education  Patient Education  Education Given To: Patient  Education Provided: Role of Therapy, Plan of Care, Transfer Training  Education Method: Verbal  Barriers to Learning: None  Education Outcome: Verbalized understanding, Continued education needed      Functional Outcome Measures  AM-PAC Daily Activity Inpatient   How much help for putting on and taking off regular lower body clothing?: A Little  How much help for Bathing?: A Little  How much help for Toileting?: A Little  How much help for putting on and taking off regular upper body clothing?: A Little  How much help for taking care of personal grooming?: A Little  How much help for eating meals?: A Little  AM-[facility] Inpatient Daily Activity Raw Score: 18  AM-PAC Inpatient ADL T-Scale Score : 38.66  ADL Inpatient CMS 0-100% Score: 46.65  ADL Inpatient CMS G-Code Modifier : CK       Goals  Patient Goals   Patient goals : To feel better  Short Term Goals  Time Frame for Short Term Goals: By discharge  Short Term Goal 1: Patient will verbalize/demonstrate Good understanding of assistive equipment/durable medical equipment/modified techniques to maximize safety and independence with self-care. Short Term Goal 2: Patient will verbalize/demonstrate Good understanding of Fall Prevention Strategies to promote increased independence with self-care and mobility. Short Term Goal 3: Patient will perform BADLs with modified independence and Good safety/pacing. Short Term Goal 4: Patient will perform functional mobility and transfers during self-care with modified independence and Good safety/pacing. Short Term Goal 5: Patient will actively participate in 15-25 minutes of therapeutic exercise/functional activities to promote increased independence with self-care and mobility. Plan  Occupational Therapy Plan  Times Per Week: 3-5  Times Per Day:  Once a day  Current Treatment Recommendations: Self-Care / ADL, Home management training, Strengthening, Balance training, Functional mobility training, Endurance training, Safety education & training, Patient/Caregiver education & training, Equipment evaluation, education, & procurement      OT Individual Minutes  OT Individual Minutes  Time In: 5956  Time Out: 4781  Minutes: 23           Electronically signed by VERÓNICA Sandra on 1/23/23 at 11:40 AM EST

## 2023-01-23 NOTE — ED NOTES
Pt. To the ED c/o SOB that started today. Pt. States that she always feels slightly SOB d/t emphysema but states that it got much worse today. On room air pt. Is 86% placed on 3L O2 pt. Is 100%. Pt. States that she wears O2 at home when needed. Placed on full cardiac monitor. Dr. Lia Judd.        Michael Valverde, ROMELIA  01/22/23 7254 [facility], RN  01/22/23 4199

## 2023-01-23 NOTE — ED PROVIDER NOTES
EMERGENCY DEPARTMENT ENCOUNTER   ATTENDING ATTESTATION     Pt Name: Anand Noguera  MRN: [id number]  [place] 1945  Date of evaluation: 1/22/23       Anand Noguera is a 65 y.o. female who presents with Shortness of Breath      MDM: 69-year-old female presents for complaints of shortness of breath. Patient reports that she has a history of COPD states that she has been having intermittent cold-like symptoms. She states that in the last day or so she has been feeling increasingly short of breath both at rest and with exertion. She admits to dry cough, denies any fevers or chills denies any chest pain, abdominal pain nausea or vomiting. On initial exam patient in no acute distress, vital stable she satting at 99% on 3 L of nasal cannula she reports that she does wear this intermittently at home, patient is noted be somewhat diminished bilaterally we will check labs imaging, will provide breathing treatments steroid      EKG showing normal sinus rhythm rate of 80, normal axis, normal intervals, no ST segment elevation or depression no significant T wave changes, normal EKG    Labs reviewed D-dimer was mildly elevated, will check CT chest    Remaining labs were unremarkable    Chest x-ray and CT were negative for acute process    Suspect patient's symptoms related to COPD exacerbation, given new oxygen requirement, will admit for further treatment    Plan for admission discussed with patient she is agreeable    Spoke with Michael Milian NP for Dr. Dany Vásquez who accepts admission . Patient demonstrates understanding and agreement with the plan, was given the opportunity to ask questions, and these questions were answered to the best of the provided information at this time. VS stable for transfer. This dictation was prepared using [employer] [facility] [employer] voice recognition software.        Vitals:   Vitals:    01/22/23 2054 01/22/23 2130 01/22/23 2235 01/23/23 0015   BP: (!) 130/104 138/62  138/67   Pulse:  75 77 78   Resp:  17 16 16   Temp:    97.4 °F (36.3 °C)   TempSrc:    Oral   SpO2:  99% 98% 98%   Weight:       Height:             I personally saw and examined the patient. I have reviewed and agree with the resident's findings, including all diagnostic interpretations and treatment plan as written. I was present for the key portions of any procedures performed and the inclusive time noted for any critical care statement. The care is provided during an unprecedented national emergency due to the novel coronavirus, COVID 19.   Per Duckworth DO  Attending Emergency Physician           Per Duckworth DO  01/23/23 1928

## 2023-01-23 NOTE — PROGRESS NOTES
Patient admitted per wheelchair to room 2117. Patient alert and oriented. VS taken and telemetry applied. Oriented to room. Bed alarm placed and patient instructed not to get out of bed per self. Daughter present during admission.

## 2023-01-23 NOTE — CARE COORDINATION
CASE MANAGEMENT NOTE:    Admission Date:  1/22/2023 Den Collazo is a 65 y.o.  female    Admitted for : COPD exacerbation ([facility] [address] [J44.1]    Met with:  Patient    PCP:  Dr. Pantoja Begin:  [employer]      Is patient alert and oriented at time of discussion:  Yes    Current Residence/ Living Arrangements:  independently at home             Current Services PTA:  No    Does patient go to outpatient dialysis: No  If yes, location and chair time:   Who is their nephrologist?     Is patient agreeable to VNS: No    Freedom of choice provided:  No    List of [address] provided: NA    VNS chosen:  No    DME:  none    Home Oxygen: No    Nebulizer: No    CPAP/BIPAP: No    Supplier: N/A    Potential Assistance Needed: No    SNF needed: No    Freedom of choice and list provided: NA    Pharmacy:  [facility] on [address]       Is patient currently receiving oral anticoagulation therapy? No    Is the Patient an [facility] with Readmission Risk Score greater than 14%? No  If yes, pt needs a follow up appointment made within 7 days. Family Members/Caregivers that are willing and able to care for patient at home:    Yes    If yes, list name here:  [place]    Family/caregivers educated on resources available including DME and respite care: NA    Transportation Provider:  Patient             Discharge Plan:  1/23/23 [employer] Pt is from home in a one story condo he dtr lives two houses down DME none VNS denies Plan is to discharge to home with no needs will continue to follow IMM 1/23/23 @ 1150 .//tv                 Electronically signed by:  Marixa Guevara RN on 1/23/2023 at 12:13 PM

## 2023-01-23 NOTE — H&P
History and Physical Service  [facility] Internal Medicine    HISTORY AND PHYSICAL EXAMINATION            Date:   1/23/2023  Patient name:  Elena Chavez  MRN:   [id number]  Account:  [de-identified]  YOB: 1945  PCP:    Monica Morales MD  Code Status:    Full Code    Chief Complaint:     Chief Complaint   Patient presents with    Shortness of Breath         History Obtained From:     Patient, EMR, nursing staff    HPI     This patient is a 65 y.o. Non- / non  femalewho presents with  Shortness of Breath   and is admitted to the hospital for the management of COPD exacerbation ([facility] [address] According to patient, she began feeling short of breath late yesterday morning. Symptoms were significantly worse this morning and have progressively worsened since. Symptoms are associated with posterior headache and occasional cough with white sputum production. Denies fever, chills, chest pain, abdominal pain, nausea, vomiting, diarrhea, and urinary symptoms. Symptoms are aggrevated with activity. There are no alleviating factors. Symptoms are reported to be constant and moderate; breathing is significantly improved since arrival and headache is resolved. Patient reports that she has oxygen at home that she rarely needs to use. Stated that she applied oxygen at 2l/min and used her Duoneb aerosol, but neither relived her symptoms. ED provider reports that SpO2 was 85% on room air on arrival; SpO2 currently mid to upper 90s on 3L/min per NC      Review of Systems:     Positive for cough and shortness of breath  Denies chest pain or palpitations  Denies abdominal pain, diarrhea vomiting  Denies any new numbness tremors or weakness. A 10 point review of systems was performed and and negative except as mentioned in HPI  Positive and Negative as described in HPI.       Past Medical History:     Past Medical History:   Diagnosis Date    COPD (chronic obstructive pulmonary disease) ([facility] [address]     Hyperlipidemia     Hypertension     Kidney stone     Thyroid disease         Past Surgical History:     Past Surgical History:   Procedure Laterality Date    UPPER GASTROINTESTINAL ENDOSCOPY N/A 6/13/2022    EGD performed by Lupillo Steen MD at [address] [place] [address] N/A 9/28/2022    EGD BIOPSY performed by Lupillo Steen MD at [facility] ENDO        Medications Prior to Admission:     Prior to Admission medications    Medication Sig Start Date End Date Taking? Authorizing Provider   ipratropium-albuterol (DUONEB) 0.5-2.5 (3) MG/3ML SOLN nebulizer solution Inhale 1 vial into the lungs 4 times daily   Yes Historical Provider, MD   albuterol sulfate HFA (PROVENTIL;VENTOLIN;PROAIR) 108 (90 Base) MCG/ACT inhaler Inhale 2 puffs into the lungs every 4 hours as needed for Wheezing 12/16/22  Yes Monica Morales MD   fluticasone (FLONASE) 50 MCG/ACT nasal spray 1 spray by Each Nostril route daily  Patient taking differently: 1 spray by Each Nostril route daily as needed for Rhinitis or Allergies 11/22/22  Yes Monica Morales MD   lisinopril (PRINIVIL;ZESTRIL) 10 MG tablet Take 1 tablet by mouth in the morning. 7/26/22  Yes Monica Morales MD   levothyroxine (SYNTHROID) 88 MCG tablet Take 1 tablet by mouth in the morning. 7/26/22  Yes Monica Morales MD   atorvastatin (LIPITOR) 20 MG tablet Take 1 tablet by mouth in the morning. 7/26/22  Yes Monica Morales MD   amLODIPine (NORVASC) 5 MG tablet Take 1 tablet by mouth in the morning.  7/26/22  Yes Monica Morales MD   pantoprazole (PROTONIX) 40 MG tablet Take 1 tablet by mouth in the morning. 7/20/22  Yes Monica Morales MD   Melatonin 5 MG CAPS Take 5 mg by mouth nightly as needed (sleep)   Yes Historical Provider, MD   b complex vitamins capsule Take 1 capsule by mouth daily   Yes Historical Provider, MD   Cholecalciferol (VITAMIN D3) 50 MCG (2000 UT) CAPS Take by mouth   Yes Historical Provider, MD Shahid N Dany Vazquez (NEURIVA PO) Take by mouth   Yes Historical Provider, MD   Acetaminophen (TYLENOL) 325 MG CAPS Take by mouth   Yes Historical Provider, MD   Multiple Vitamins-Minerals (MULTIVITAMIN ADULTS 50+) TABS Take 1 tablet by mouth daily 6/22/22  Yes Renny Donnelly MD        Allergies:     Patient has no known allergies. Social History:     Tobacco:    reports that she quit smoking about 8 months ago. Her smoking use included cigarettes. She started smoking about 62 years ago. She smoked an average of .5 packs per day. She has never used smokeless tobacco.  Alcohol:      reports current alcohol use of about 2.0 standard drinks per week. Drug Use:  reports no history of drug use. Family History:     Family History   Problem Relation Age of Onset    No Known Problems Mother     No Known Problems Father            Physical Exam:   /62   Pulse (!) 101 Comment: 1101 at rest, 107 post removing socks  Temp 97.6 °F (36.4 °C) (Oral)   Resp 16   Ht 5' 2\" (1.575 m)   Wt 120 lb (54.4 kg)   SpO2 95% Comment: 95% at rest, 90% post removing socks  BMI 21.95 kg/m²   No results for input(s): POCGLU in the last 72 hours. General Appearance:  alert, well appearing, and in no acute distress  Mental status: oriented to person, place, and time with normal affect  Head:  normocephalic, atraumatic. Eye: no icterus, redness, pupils equal and reactive, extraocular eye movements intact, conjunctiva clear  Ear: normal external ear, no discharge, hearing intact  Nose:  no drainage noted  Mouth: mucous membranes moist  Neck: supple, no carotid bruits, thyroid not palpable  Lungs: Bilateral equal air entry, clear to ausculation, no wheezing, rales or rhonchi, normal effort  Cardiovascular: normal rate, regular rhythm, no murmur, gallop, rub.   Abdomen: Soft, nontender, nondistended, normal bowel sounds, no hepatomegaly or splenomegaly  Neurologic: There are no new focal motor or sensory deficits, normal muscle tone and bulk, no abnormal sensation, normal speech, cranial nerves II through XII grossly intact  Skin: No gross lesions, rashes, bruising or bleeding on exposed skin area  Extremities:  peripheral pulses palpable, no pedal edema or calf pain with palpation  Psych: normal affect     Investigations:      Laboratory Testing:  Recent Results (from the past 24 hour(s))   EKG 12 Lead    Collection Time: 01/22/23  8:54 PM   Result Value Ref Range    Ventricular Rate 80 BPM    Atrial Rate 80 BPM    P-R Interval 162 ms    QRS Duration 70 ms    Q-T Interval 388 ms    QTc Calculation (Bazett) 447 ms    P Axis 75 degrees    R Axis 65 degrees    T Axis 75 degrees   CBC with Auto Differential    Collection Time: 01/22/23  8:55 PM   Result Value Ref Range    WBC 12.1 (H) 3.5 - 11.0 k/uL    RBC 4.52 4.0 - 5.2 m/uL    Hemoglobin 14.6 12.0 - 16.0 g/dL    Hematocrit 41.7 36 - 46 %    MCV 92.1 80 - 100 fL    MCH 32.2 26 - 34 pg    MCHC 35.0 31 - 37 g/dL    RDW 13.4 11.5 - 14.9 %    Platelets 878 214 - 713 k/uL    MPV 8.2 6.0 - 12.0 fL    Seg Neutrophils 73 (H) 36 - 66 %    Lymphocytes 18 (L) 24 - 44 %    Monocytes 7 1 - 7 %    Eosinophils % 1 0 - 4 %    Basophils 1 0 - 2 %    Segs Absolute 8.80 1.3 - 9.1 k/uL    Absolute Lymph # 2.20 1.0 - 4.8 k/uL    Absolute Mono # 0.80 0.1 - 1.3 k/uL    Absolute Eos # 0.20 0.0 - 0.4 k/uL    Basophils Absolute 0.10 0.0 - 0.2 k/uL   Basic Metabolic Panel    Collection Time: 01/22/23  8:55 PM   Result Value Ref Range    Glucose 112 (H) 70 - 99 mg/dL    BUN 16 8 - 23 mg/dL    Creatinine 0.78 0.50 - 0.90 mg/dL    Est, Glom Filt Rate >60 >60 mL/min/1.73m2    Calcium 9.8 8.6 - 10.4 mg/dL    Sodium 129 (L) 135 - 144 mmol/L    Potassium 4.3 3.7 - 5.3 mmol/L    Chloride 92 (L) 98 - 107 mmol/L    CO2 24 20 - 31 mmol/L    Anion Gap 13 9 - 17 mmol/L   Brain Natriuretic Peptide    Collection Time: 01/22/23  8:55 PM   Result Value Ref Range    Pro- <300 pg/mL   D-Dimer, Quantitative    Collection Time: 01/22/23  8:55 PM Result Value Ref Range    D-Dimer, Quant 0.56 0.00 - 0.59 mg/L FEU   Troponin    Collection Time: 01/22/23  8:55 PM   Result Value Ref Range    Troponin, High Sensitivity 11 0 - 14 ng/L   TSH w/reflex to FT4    Collection Time: 01/22/23  8:55 PM   Result Value Ref Range    TSH 0.28 (L) 0.30 - 5.00 uIU/mL   T4, Free    Collection Time: 01/22/23  8:55 PM   Result Value Ref Range    Thyroxine, Free 2.05 (H) 0.93 - 1.70 ng/dL   COVID-19 & Influenza Combo    Collection Time: 01/22/23  9:05 PM    Specimen: Nasopharyngeal Swab   Result Value Ref Range    Specimen Description . NASOPHARYNGEAL SWAB     Source . NASOPHARYNGEAL SWAB     SARS-CoV-2 RNA, RT PCR Not Detected Not Detected    INFLUENZA A Not Detected Not Detected    INFLUENZA B Not Detected Not Detected   Troponin    Collection Time: 01/22/23 10:16 PM   Result Value Ref Range    Troponin, High Sensitivity 11 0 - 14 ng/L   Basic Metabolic Panel w/ Reflex to MG    Collection Time: 01/23/23  5:13 AM   Result Value Ref Range    Glucose 184 (H) 70 - 99 mg/dL    BUN 17 8 - 23 mg/dL    Creatinine 0.88 0.50 - 0.90 mg/dL    Est, Glom Filt Rate >60 >60 mL/min/1.73m2    Calcium 9.5 8.6 - 10.4 mg/dL    Sodium 126 (L) 135 - 144 mmol/L    Potassium 5.6 (H) 3.7 - 5.3 mmol/L    Chloride 93 (L) 98 - 107 mmol/L    CO2 21 20 - 31 mmol/L    Anion Gap 12 9 - 17 mmol/L   CBC    Collection Time: 01/23/23  5:13 AM   Result Value Ref Range    WBC 6.5 3.5 - 11.0 k/uL    RBC 4.65 4.0 - 5.2 m/uL    Hemoglobin 14.5 12.0 - 16.0 g/dL    Hematocrit 43.6 36 - 46 %    MCV 93.9 80 - 100 fL    MCH 31.1 26 - 34 pg    MCHC 33.2 31 - 37 g/dL    RDW 13.2 11.5 - 14.9 %    Platelets 931 694 - 123 k/uL    MPV 8.2 6.0 - 12.0 fL   Osmolality    Collection Time: 01/23/23  5:13 AM   Result Value Ref Range    Serum Osmolality 280 275 - 295 mOsm/kg   SODIUM, URINE, RANDOM    Collection Time: 01/23/23  6:54 AM   Result Value Ref Range    Sodium,Ur 32 mmol/L   OSMOLALITY, URINE    Collection Time: 01/23/23  6:54 AM Result Value Ref Range    Osmolality, Ur 258 80 - 1300 mOsm/kg   Potassium    Collection Time: 01/23/23  7:38 AM   Result Value Ref Range    Potassium 5.1 3.7 - 5.3 mmol/L   Sodium    Collection Time: 01/23/23  7:38 AM   Result Value Ref Range    Sodium 129 (L) 135 - 144 mmol/L       Recent Labs     01/23/23 0738 01/23/23 0513   HGB  --  14.5   HCT  --  43.6   WBC  --  6.5   MCV  --  93.9   * 126*   K 5.1 5.6*   CL  --  93*   CO2  --  21   BUN  --  17   CREATININE  --  0.88   GLUCOSE  --  184*       Hematology:  Recent Labs     01/22/23 2055 01/23/23 0513   WBC 12.1* 6.5   RBC 4.52 4.65   HGB 14.6 14.5   HCT 41.7 43.6   MCV 92.1 93.9   MCH 32.2 31.1   MCHC 35.0 33.2   RDW 13.4 13.2    229   MPV 8.2 8.2   DDIMER 0.56  --      Chemistry:  Recent Labs     01/22/23 2055 01/23/23 0513 01/23/23 0738   * 126* 129*   K 4.3 5.6* 5.1   CL 92* 93*  --    CO2 24 21  --    GLUCOSE 112* 184*  --    BUN 16 17  --    CREATININE 0.78 0.88  --    ANIONGAP 13 12  --    LABGLOM >60 >60  --    CALCIUM 9.8 9.5  --    PROBNP 223  --   --      Recent Labs     01/22/23 2055   TSH 0.28*       Imaging/Diagnostics:       XR CHEST PORTABLE    Result Date: 1/22/2023  EXAMINATION: ONE XRAY VIEW OF THE CHEST 1/22/2023 9:08 pm COMPARISON: Chest x-ray June 12, 2022. HISTORY: ORDERING SYSTEM PROVIDED HISTORY: SOB, new O2 requirement TECHNOLOGIST PROVIDED HISTORY: SOB, new O2 requirement Reason for Exam: SOB, new O2 requirement Additional signs and symptoms: SOB, new O2 requirement Relevant Medical/Surgical History: SOB, new O2 requirement FINDINGS: Lungs are hyperaerated. Increased interstitial markings. Heart and mediastinum normal.  Bony thorax intact. COPD. No acute disease.      CT CHEST PULMONARY EMBOLISM W CONTRAST    Result Date: 1/22/2023  EXAMINATION: CTA OF THE CHEST 1/22/2023 9:30 pm TECHNIQUE: CTA of the chest was performed after the administration of intravenous contrast.  Multiplanar reformatted images are provided for review. MIP images are provided for review. Automated exposure control, iterative reconstruction, and/or weight based adjustment of the mA/kV was utilized to reduce the radiation dose to as low as reasonably achievable. COMPARISON: Chest x-ray from today. June 4, 2022 renal ultrasound. HISTORY: ORDERING SYSTEM PROVIDED HISTORY: eval for pe TECHNOLOGIST PROVIDED HISTORY: eval for pe Decision Support Exception - unselect if not a suspected or confirmed emergency medical condition->Emergency Medical Condition (MA) Reason for Exam: Shortness of Breath,eval for pe Additional signs and symptoms: hx copd FINDINGS: Pulmonary Arteries: Pulmonary arteries are adequately opacified for evaluation. No evidence of intraluminal filling defect to suggest pulmonary embolism. Main pulmonary artery is normal in caliber. Mediastinum: No evidence of mediastinal lymphadenopathy. The heart and pericardium demonstrate no acute abnormality. There is no acute abnormality of the thoracic aorta. Lungs/pleura: The lungs are without acute process. No focal consolidation or pulmonary edema. No evidence of pleural effusion or pneumothorax. Upper Abdomen: Severe longstanding right hydronephrosis and or parapelvic cysts. Soft Tissues/Bones: Compression fracture T6, age indeterminate. Severe right hydronephrosis appears chronic. Compression fracture T6, age indeterminate. Otherwise no acute disease.         Current Facility-Administered Medications   Medication Dose Route Frequency Provider Last Rate Last Admin    0.9 % sodium chloride infusion   IntraVENous Continuous Kegley Fey, APRN - CNP 75 mL/hr at 01/23/23 0044 New Bag at 01/23/23 0044    sodium chloride flush 0.9 % injection 5-40 mL  5-40 mL IntraVENous 2 times per day Kegley Fey, APRN - CNP        sodium chloride flush 0.9 % injection 5-40 mL  5-40 mL IntraVENous PRN Kegley Fey, APRN - CNP        0.9 % sodium chloride infusion   IntraVENous PRN Prabhjot Gavin April Chadwick - CNP        ondansetron (ZOFRAN-ODT) disintegrating tablet 4 mg  4 mg Oral Q8H PRN Margarita Fluke, APRN - CNP        Or    ondansetron [facility]) injection 4 mg  4 mg IntraVENous Q6H PRN Margarita Fluke, APRN - CNP        polyethylene glycol (GLYCOLAX) packet 17 g  17 g Oral Daily PRN Margarita Fluke, APRN - CNP        enoxaparin (LOVENOX) injection 40 mg  40 mg SubCUTAneous Daily Margarita Fluke, APRN - CNP        acetaminophen (TYLENOL) tablet 650 mg  650 mg Oral Q6H PRN Margarita Fluke, APRN - CNP        Or    acetaminophen (TYLENOL) suppository 650 mg  650 mg Rectal Q6H PRN Margarita Fluke, APRN - CNP        albuterol (PROVENTIL) nebulizer solution 2.5 mg  2.5 mg Nebulization Q2H PRN Margarita Fluke, APRN - CNP        ipratropium-albuterol (DUONEB) nebulizer solution 1 ampule  1 ampule Inhalation Q4H WA Margarita Fluke, APRN - CNP   1 ampule at 01/23/23 0734    methylPREDNISolone sodium (SOLU-MEDROL) injection 60 mg  60 mg IntraVENous Q6H Margarita Fluke, APRN - CNP   60 mg at 01/23/23 0334    guaiFENesin (MUCINEX) extended release tablet 1,200 mg  1,200 mg Oral BID Margarita Fluke, APRN - CNP   1,200 mg at 01/23/23 0036    amLODIPine (NORVASC) tablet 5 mg  5 mg Oral Daily Margarita Fluke, APRN - CNP        atorvastatin (LIPITOR) tablet 20 mg  20 mg Oral Daily Margarita Fluke, APRN - CNP        levothyroxine (SYNTHROID) tablet 88 mcg  88 mcg Oral Daily Margarita Fluke, APRN - CNP   88 mcg at 01/23/23 0502    lisinopril (PRINIVIL;ZESTRIL) tablet 10 mg  10 mg Oral Daily Margarita Fluke, APRN - CNP        melatonin tablet 6 mg  6 mg Oral Nightly PRN Margarita Fluke, APRN - CNP   6 mg at 01/23/23 0038    pantoprazole (PROTONIX) tablet 40 mg  40 mg Oral Daily Margarita Fluke, APRN - CNP        ipratropium-albuterol (DUONEB) nebulizer solution 1 ampule  1 ampule Inhalation Q4H PRN Stef Gallardo, DO   1 ampule at 01/23/23 0450    sodium chloride flush 0.9 % injection 10 mL  10 mL IntraVENous PRN Maximilian Reddy, DO   10 mL at 01/22/23 3140 Impressions :     1. Principal Problem:    COPD exacerbation ([facility] [address]  Resolved Problems:    * No resolved hospital problems. *        2.  has a past medical history of COPD (chronic obstructive pulmonary disease) ([facility] [address] Hyperlipidemia, Hypertension, Kidney stone, and Thyroid disease. Plans:     57-year-old female admitted for acute on chronic hypoxic respiratory failure secondary to COPD exacerbation    Acute on chronic hypoxic hypercapnic respiratory failure patient currently on 3.5 L nasal cannula oxygen, has 2 L oxygen at home but rarely needs to use it  COPD exacerbation-IV steroids, bronchodilators  Elevated D-dimer-CT chest negative for PE  Hyponatremia-improving with hydration we will repeat labs this p.m.   Hyperkalemia improving with hydration  Hypothyroid-TSH-we will reduce Synthroid from 88 to 75 MCG per day; will need to repeat TSH outpatient  hypertension-lisinopril resumed  Right-sided hydronephrosis on CT-recommend outpatient follow-up    Patient seen this morning during rounds appears much more comfortable on 2 L nasal cannula oxygen  Will give another couple of doses of IV steroids anticipate discharge this afternoon    DVT pplx-Lovenox          Brianna Mendoza MD  1/23/2023  9:36 AM

## 2023-01-23 NOTE — ED NOTES
Called Respiratory via [employer] [id number], [id number] , & [id number] for a breathing treatment.  Received an answer @9105 and was informed treatment was given     Laek Conn  01/22/23 8447

## 2023-01-23 NOTE — PLAN OF CARE
Problem: Discharge Planning  Goal: Discharge to home or other facility with appropriate resources  Outcome: Progressing     Problem: Pain  Goal: Verbalizes/displays adequate comfort level or baseline comfort level  Outcome: Progressing  Note: Patient denies pain. Able to reposition self comfortably. Problem: Respiratory - Adult  Goal: Achieves optimal ventilation and oxygenation  Outcome: Progressing  Note: Lungs clear but diminished. Slightly short of breath with movement. Oxygen sat in 90s on 3 liters nasal cannula. Occasional congested nonproductive cough. Problem: Safety - Adult  Goal: Free from fall injury  Outcome: Progressing  Note: Patient alert and oriented. Bed alarm on. Instructed to call nurse when she has to use the restroom.

## 2023-01-23 NOTE — ED TRIAGE NOTES
Mode of arrival (squad #, walk in, police, etc) : walk in        Chief complaint(s): SOB        Arrival Note (brief scenario, treatment PTA, etc). : Patient brought in by son in law for increased SOB. Patient spO2 was 86% on RA. RN applied 3L O2 patient increased to 99%. C= \"Have you ever felt that you should Cut down on your drinking? \"  No  A= \"Have people Annoyed you by criticizing your drinking? \"  No  G= \"Have you ever felt bad or Guilty about your drinking? \"  No  E= \"Have you ever had a drink as an Eye-opener first thing in the morning to steady your nerves or to help a hangover? \"  No      Deferred []      Reason for deferring: N/A    *If yes to two or more: probable alcohol abuse. *

## 2023-01-23 NOTE — PROGRESS NOTES
Feliz Cardoso is a 65 y.o. Non- / non  female who presents with Shortness of Breath   and is admitted to the hospital for the management of COPD exacerbation ([facility] [address] According to patient, she began feeling short of breath late yesterday morning. Symptoms were significantly worse this morning and have progressively worsened since. Symptoms are associated with posterior headache and occasional cough with white sputum production. Denies fever, chills, chest pain, abdominal pain, nausea, vomiting, diarrhea, and urinary symptoms. Symptoms are aggrevated with activity. There are no alleviating factors. Symptoms are reported to be constant and moderate; breathing is significantly improved since arrival and headache is resolved. Patient reports that she has oxygen at home that she rarely needs to use. Stated that she applied oxygen at 2l/min and used her Duoneb aerosol, but neither relived her symptoms. ED provider reports that SpO2 was 85% on room air on arrival; SpO2 currently mid to upper 90s on 3L/min per NC      Patient status inpatient in the Progressive Unit/Step down    COPD Exacerbation  -2 days shortness of breath  -WBC 12.1; D-dimer- 0.56; BNP - 223  -Rapid Swab negative for Covid and Influenza A&B  -CXR - COPD - no acute disease  -CT Chest No acute disease  -IV Solumedrol initiated in ED  --Continue on admission  -Mucinex BID  -Respiratory care eval and treat  -Duoneb aerosols 4x/ day  -Albuterol aerosols PRN  -Sputum C&S - pending specimen  -Does not currently follow with pulmonology    Hypothyroidism  -TSH .28;   Free Thyroxine - 2.05  -continue home dose Synthroid    Hydronephrosis  -Incidental finding on CT chest  --Severe right hydronephrosis - appears chronic  --Creatinine 0.78  --Denies urinary symptoms  -no previous results available for comparison      Disposition 3 days      Consultations:   IP CONSULT TO INTERNAL MEDICINE    Patient is admitted as inpatient status because of co-morbidities listed above, severity of signs and symptoms as outlined, requirement for current medical therapies and most importantly because of direct risk to patient if care not provided in a hospital setting. Expected length of stay > 48 hours.     KAM Reyes - CNP  1/22/2023  11:19 PM

## 2023-01-23 NOTE — ACP (ADVANCE CARE PLANNING)
Advance Care Planning     Advance Care Planning Activator (Inpatient)  Conversation Note      Date of ACP Conversation: 1/23/2023     Conversation Conducted with: Patient with Decision Making Capacity    ACP Activator: Radha Valera RN        Health Care Decision Maker:     Current Designated Health Care Decision Maker:     Primary Decision Maker: Bella Diaz Child - [phone]    Primary Decision Maker: Preethi Wan - Child - [phone]  Click here to complete Healthcare Decision Makers including section of the Healthcare Decision Maker Relationship (ie \"Primary\")      Care Preferences    Ventilation: \"If you were in your present state of health and suddenly became very ill and were unable to breathe on your own, what would your preference be about the use of a ventilator (breathing machine) if it were available to you? \"      Would the patient desire the use of ventilator (breathing machine)?: yes    \"If your health worsens and it becomes clear that your chance of recovery is unlikely, what would your preference be about the use of a ventilator (breathing machine) if it were available to you? \"     Would the patient desire the use of ventilator (breathing machine)?: Yes      Resuscitation  \"CPR works best to restart the heart when there is a sudden event, like a heart attack, in someone who is otherwise healthy. Unfortunately, CPR does not typically restart the heart for people who have serious health conditions or who are very sick. \"    \"In the event your heart stopped as a result of an underlying serious health condition, would you want attempts to be made to restart your heart (answer \"yes\" for attempt to resuscitate) or would you prefer a natural death (answer \"no\" for do not attempt to resuscitate)? \" yes       [] Yes   [] No   Educated Patient / Nisa Stanley regarding differences between Advance Directives and portable DNR orders.     Length of ACP Conversation in minutes:      Lenny Aguirre Outcomes:  [x] ACP discussion completed  [] Existing advance directive reviewed with patient; no changes to patient's previously recorded wishes  [] New Advance Directive completed  [] Portable Do Not Rescitate prepared for Provider review and signature  [] POLST/POST/MOLST/MOST prepared for Provider review and signature      Follow-up plan:    [] Schedule follow-up conversation to continue planning  [] Referred individual to Provider for additional questions/concerns   [] Advised patient/agent/surrogate to review completed ACP document and update if needed with changes in condition, patient preferences or care setting    [] This note routed to one or more involved healthcare providers

## 2023-01-23 NOTE — ED PROVIDER NOTES
[address]  Emergency Department Encounter  Emergency Medicine Resident     Pt Fuad Bailey  MRN: [id number]  [place] 1945  Date of evaluation: 1/22/23  PCP:  Bandar Soto MD  Note Started: 8:43 PM EST    CHIEF COMPLAINT       Chief Complaint   Patient presents with    Shortness of Breath       HISTORY OF PRESENT ILLNESS  (Location/Symptom, Timing/Onset, Context/Setting, Quality, Duration, Modifying Factors, Severity.)      Anastasia Barnes is a 65 y.o. female who presents with shortness of breath and headache. Patient states she had gradual onset of shortness of breath last night that has worsened today. She does have history of emphysema and uses oxygen as needed at home and nebulizer treatments 4 times daily as needed. She did use 2 nebulizer treatments at home without any symptom improvement. She states she had concurrent onset of a posterior headache not associated with any vision changes. She did not take any other medications prior to arrival.  On arrival, she is still endorsing shortness of breath. She denies any history of DVT or PE, chest pain, abdominal pain, nausea, vomiting, diarrhea, fevers, chills. She has had only 1 dose of her COVID vaccination    PAST MEDICAL / SURGICAL / SOCIAL / FAMILY HISTORY      has a past medical history of COPD (chronic obstructive pulmonary disease) ([address] Hyperlipidemia, Hypertension, Kidney stone, and Thyroid disease. has a past surgical history that includes Upper gastrointestinal endoscopy (N/A, 6/13/2022) and Upper gastrointestinal endoscopy (N/A, 9/28/2022).       Social History     Socioeconomic History    Marital status: Single     Spouse name: Not on file    Number of children: Not on file    Years of education: Not on file    Highest education level: Not on file   Occupational History    Not on file   Tobacco Use    Smoking status: Former     Packs/day: 0.50     Types: Cigarettes     Start date: 64     Quit date: 5/1/2022 Years since quittin.7    Smokeless tobacco: Never    Tobacco comments:     using patches currently   Vaping Use    Vaping Use: Never used   Substance and Sexual Activity    Alcohol use: Yes     Alcohol/week: 2.0 standard drinks     Types: 2 Glasses of wine per week    Drug use: Never    Sexual activity: Not on file   Other Topics Concern    Not on file   Social History Narrative    Not on file     Social Determinants of Health     Financial Resource Strain: Low Risk     Difficulty of Paying Living Expenses: Not hard at all   Food Insecurity: No Food Insecurity    Worried About Running Out of Food in the Last Year: Never true    Ran Out of Food in the Last Year: Never true   Transportation Needs: Not on file   Physical Activity: Not on file   Stress: Not on file   Social Connections: Not on file   Intimate Partner Violence: Not on file   Housing Stability: Not on file       Family History   Problem Relation Age of Onset    No Known Problems Mother     No Known Problems Father        Allergies:  Patient has no known allergies. Home Medications:  Prior to Admission medications    Medication Sig Start Date End Date Taking? Authorizing Provider   albuterol sulfate HFA (PROVENTIL;VENTOLIN;PROAIR) 108 (90 Base) MCG/ACT inhaler Inhale 2 puffs into the lungs every 4 hours as needed for Wheezing 22   Nelia Arias MD   tiotropium (SPIRIVA) 18 MCG inhalation capsule Inhale 1 capsule into the lungs daily 22   Nelia Arias MD   fluticasone (FLONASE) 50 MCG/ACT nasal spray 1 spray by Each Nostril route daily 22   Nelia Arias MD   lisinopril (PRINIVIL;ZESTRIL) 10 MG tablet Take 1 tablet by mouth in the morning. 22   Nelia Arias MD   levothyroxine (SYNTHROID) 88 MCG tablet Take 1 tablet by mouth in the morning. 22   Nelia Arias MD   atorvastatin (LIPITOR) 20 MG tablet Take 1 tablet by mouth in the morning.  22   Nelia Arias MD   amLODIPine (NORVASC) 5 MG tablet Take 1 tablet by mouth in the morning. 7/26/22   Elba Boykin MD   albuterol (PROVENTIL) (2.5 MG/3ML) 0.083% nebulizer solution Take 3 mLs by nebulization every 6 hours as needed for Wheezing 7/26/22   Elba Boykin MD   simvastatin (ZOCOR) 40 MG tablet Take 40 mg by mouth daily    Historical Provider, MD   pantoprazole (PROTONIX) 40 MG tablet Take 1 tablet by mouth in the morning. 7/20/22   Elba Boykin MD   Melatonin 5 MG CAPS Take 3 mg by mouth daily    Historical Provider, MD   b complex vitamins capsule Take 1 capsule by mouth daily    Historical Provider, MD   Cholecalciferol (VITAMIN D3) 50 MCG (2000 UT) CAPS Take by mouth    Historical Provider, MD   Misc Natural Products (James Erm PO) Take by mouth    Historical Provider, MD   Acetaminophen (TYLENOL) 325 MG CAPS Take by mouth    Historical Provider, MD   Multiple Vitamins-Minerals (MULTIVITAMIN ADULTS 50+) TABS Take 1 tablet by mouth daily 6/22/22   Elba Boykin MD       REVIEW OF SYSTEMS       Review of Systems   Constitutional:  Negative for chills, fatigue and fever. HENT:  Negative for congestion, rhinorrhea and sore throat. Eyes:  Negative for visual disturbance. Respiratory:  Positive for shortness of breath. Negative for cough and wheezing. Cardiovascular:  Negative for chest pain and palpitations. Gastrointestinal:  Negative for abdominal pain, diarrhea, nausea and vomiting. Genitourinary:  Negative for dysuria and hematuria. Musculoskeletal:  Negative for arthralgias and myalgias. Skin:  Negative for rash. Neurological:  Positive for headaches. Negative for dizziness, tremors, syncope, weakness, light-headedness and numbness. All other systems reviewed and are negative. PHYSICAL EXAM      INITIAL VITALS:   /62   Pulse 77   Temp 98.6 °F (37 °C) (Oral)   Resp 16   Ht 5' 2\" (1.575 m)   Wt 120 lb (54.4 kg)   SpO2 98%   BMI 21.95 kg/m²     Physical Exam  Vitals reviewed.    Constitutional:       General: She is not in acute distress. Appearance: She is not toxic-appearing. HENT:      Head: Normocephalic. Mouth/Throat:      Pharynx: Oropharynx is clear. Eyes:      Extraocular Movements: Extraocular movements intact. Pupils: Pupils are equal, round, and reactive to light. Cardiovascular:      Rate and Rhythm: Normal rate and regular rhythm. Pulmonary:      Effort: Pulmonary effort is normal.      Breath sounds: Normal breath sounds. No decreased breath sounds, wheezing, rhonchi or rales. Comments: Increased O2 requirement, initially 86% on RA now on 3 L O2 at 97%  Abdominal:      Palpations: Abdomen is soft. Tenderness: There is no abdominal tenderness. There is no guarding or rebound. Musculoskeletal:         General: Normal range of motion. Cervical back: Normal range of motion and neck supple. Right lower leg: No edema. Left lower leg: No edema. Skin:     Capillary Refill: Capillary refill takes less than 2 seconds. Neurological:      General: No focal deficit present. Mental Status: She is alert and oriented to person, place, and time. Motor: No weakness. DDX/DIAGNOSTIC RESULTS / EMERGENCY DEPARTMENT COURSE / MDM     Medical Decision Making  79-year-old female presents with gradual onset shortness of breath and posterior headache not associated with vision changes. Patient does have history of COPD and has taken nebulizer treatments at home without any symptom improvement. Patient initially arrived on room air satting 86%. Patient now on 3 L oxygen satting 96 to 97%. Patient denies any history of DVT or PE, however symptoms are concerning for PE versus COPD exacerbation versus pneumonia versus viral URI. Will obtain lab work-up, BNP, D-dimer, COVID and flu, chest x-ray, troponin, EKG. Amount and/or Complexity of Data Reviewed  Independent Historian: caregiver  External Data Reviewed: notes. Labs: ordered.  Decision-making details documented in ED Course. Radiology: ordered and independent interpretation performed. ECG/medicine tests: ordered and independent interpretation performed. Risk  Prescription drug management. Decision regarding hospitalization. EKG  EKG: normal sinus rhythm. No ST elevations or depression. T wave inversion in V1. All EKG's are interpreted by the Emergency Department Physician who either signs or Co-signs this chart in the absence of a cardiologist.    EMERGENCY DEPARTMENT COURSE:    ED Course as of 01/22/23 2322   [place] Jan 22, 2023 2122 D-Dimer, Quant: 0.56 [JG]   2122 CT PE pending [JG]   2226 CT PE negative for PE [JG]   2300 On reevaluation, patient states her headache is improved after migraine cocktail. SpO2 94% on 3 L O2. Discussed admission with patient and family, they are agreeable to admission at this time. [JG]      ED Course User Index  [JG] Annie Mcguire MD       CONSULTS:  IP CONSULT TO INTERNAL MEDICINE    FINAL IMPRESSION      1. COPD exacerbation ([facility] [address]          DISPOSITION / PLAN     DISPOSITION Admitted 01/22/2023 11:18:39 PM      PATIENT REFERRED TO:  No follow-up provider specified.     DISCHARGE MEDICATIONS:  New Prescriptions    No medications on file       Annie Mcguire MD  Emergency Medicine Resident    (Please note that portions of thisnote were completed with a voice recognition program.  Efforts were made to edit the dictations but occasionally words are mis-transcribed.)       Annie Mcguire MD  Resident  01/22/23 2322

## 2023-01-23 NOTE — PROGRESS NOTES
Physical Therapy  Facility/Department: [address] [place] [address]  Physical Therapy Initial Assessment    Name: Feliz Cardoso  : 1945  MRN: [id number]  Date of Service: 2023    Discharge Recommendations:  Patient would benefit from continued therapy after discharge   PT Equipment Recommendations  Equipment Needed:  (TBD)      Patient Diagnosis(es): The primary encounter diagnosis was COPD exacerbation ([facility] [address] A diagnosis of Hypothyroidism due to acquired atrophy of thyroid was also pertinent to this visit. Past Medical History:  has a past medical history of COPD (chronic obstructive pulmonary disease) ([facility] [address] Hyperlipidemia, Hypertension, Kidney stone, and Thyroid disease. Past Surgical History:  has a past surgical history that includes Upper gastrointestinal endoscopy (N/A, 2022) and Upper gastrointestinal endoscopy (N/A, 2022). Assessment   Body Structures, Functions, Activity Limitations Requiring Skilled Therapeutic Intervention: Decreased functional mobility ; Decreased balance;Decreased strength;Decreased endurance  Assessment: continue per POC to maxmize potential for safe D/C  Treatment Diagnosis: impaired mobility due to weakness and limited endurance  Specific Instructions for Next Treatment: advance gait distance and progress to 2 steps w/ left rail, instruct in HEP  Therapy Prognosis: Good  Decision Making: Medium Complexity  History: pt admitted due to COPD exacerbation  Exam: ROM, MMT, balance and mobility assessments  Clinical Presentation: SBA for bed mobility, gait 80' x 2  and transfers, fall risk  Requires PT Follow-Up: Yes  Activity Tolerance  Activity Tolerance: Patient limited by fatigue;Patient limited by endurance     Plan   Physcial Therapy Plan  General Plan:  (2-3 treatments/ 3 days)  Specific Instructions for Next Treatment: advance gait distance and progress to 2 steps w/ left rail, instruct in HEP  Current Treatment Recommendations: Strengthening, Home exercise program, Endurance training, Balance training, Functional mobility training, Safety education & training, Patient/Caregiver education & training, Gait training, Transfer training, Stair training  Safety Devices  Type of Devices: All fall risk precautions in place, Call light within reach, Chair alarm in place, Gait belt, Left in chair, Patient at risk for falls, Nurse notified     Restrictions  Restrictions/Precautions  Restrictions/Precautions: Up as Tolerated, Bed Alarm (O2 at 2 L, peripheral IV left arm)  Required Braces or Orthoses?: No     Subjective   Pain: denies  General  Patient assessed for rehabilitation services?: Yes  Response To Previous Treatment: Not applicable  Family / Caregiver Present: No  Referring Practitioner: Bettye Silva CNP  Referral Date : 01/23/23  Diagnosis: COPD exacerbation  Follows Commands: Within Functional Limits  Other (Comment): OK per nurse Brenda Pineda to proceed w/ PT evaluation. Nurse indictaes that the T6 compression fracture is chronic. General Comment  Comments: CT scan of the chest shows compression fracture T6. Subjective  Subjective: PT was admitted w/ C/O SOB, headache and cough.          Social/Functional History  Social/Functional History  Lives With: Alone  Type of Home: House (townhouse)  Home Layout: Two level, Able to Live on Main level with bedroom/bathroom, Performs ADL's on one level  Home Access: Stairs to enter with rails  Entrance Stairs - Number of Steps: 2 w/ left  railsm in back, 2 in front w/o rail in front- uses back entrance  Entrance Stairs - Rails: Left  Bathroom Shower/Tub: Walk-in shower  Bathroom Toilet: Standard  Bathroom Equipment: Shower chair, Grab bars in shower, Hand-held shower  Home Equipment: Oxygen (O2 at 2 L PRN)  Has the patient had two or more falls in the past year or any fall with injury in the past year?: No  Receives Help From: Family  ADL Assistance: [facility] [facility] [address]: Needs assistance (dtr takes her grocery shopping, does her own cooking, cleaning and laundry)  Homemaking Responsibilities: Yes  Ambulation Assistance: Independent (no device)  Transfer Assistance: Independent  Active : No  Patient's  Info: dtr  Mode of Transportation: SUV  Occupation: Retired  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  Leisure & Hobbies: read  IADL Comments: sleeps in a flat bed  Additional Comments: dtr lives 2 doors down (works part time)  Vision/Hearing  Vision  Vision: Impaired  Vision Exceptions: Wears glasses for reading  Hearing  Hearing: Exceptions to [facility]  Hearing Exceptions: Hard of hearing/hearing concerns; No hearing aid    Cognition   Orientation  Overall Orientation Status: Within Functional Limits  Orientation Level: Oriented to place;Oriented X4;Oriented to time;Oriented to situation;Oriented to person     Objective   Heart Rate: 101 at rest, 107 post removing socks, 104 post gait, 104 at the end of treatment  SpO2: 95% at rest, 90% post removing socks, 88% post gait, 92% end of treatment  O2 Device: Nasal cannula  Comment: 2 L     Observation/Palpation  Observation: O2 at 2 L, peripheral IV left arm  Gross Assessment  Sensation: Impaired (C/O numbness left thumb, index and middle ingers from carpal tunell)     AROM RLE (degrees)  RLE AROM: WFL  AROM LLE (degrees)  LLE AROM : WFL  AROM RUE (degrees)  RUE General AROM: see OT for UE assessment  AROM LUE (degrees)  LUE General AROM: see OT for UE assessment  Strength RLE  Comment: grossly 4-/5  Strength LLE  Comment: grossly 4-/5  Strength RUE  Comment: see OT for UE assessment  Strength LUE  Comment: see OT for UE assessment           Bed mobility  Rolling to Left: Stand by assistance  Supine to Sit: Stand by assistance  Bed Mobility Comments: pt dangled at the EOB w/ supervision  Transfers  Sit to Stand: Stand by assistance  Stand to Sit: Stand by assistance  Stand Pivot Transfers: Stand by assistance (no device)  Ambulation  Surface: Level tile  Device: No Device  Assistance: Stand by assistance  Gait Deviations: Slow Amanda  Distance: 80' x 2 w/ 1 standing rest break  Stairs/Curb  Stairs?: No (has 2 w/ left rail to enter at home)     Balance  Sitting - Static: Good  Sitting - Dynamic: Good  Standing - Static: Good (no device)  Standing - Dynamic: Good;- (no device)           OutComes Score                                                  AM-PAC Score  AM-PAC Inpatient Mobility Raw Score : 16 (01/23/23 0905)  AM-PAC Inpatient T-Scale Score : 40.78 (01/23/23 0905)  Mobility Inpatient CMS 0-100% Score: 54.16 (01/23/23 0905)  Mobility Inpatient CMS G-Code Modifier : CK (01/23/23 0905)          Tinneti Score       Goals  Short Term Goals  Time Frame for Short Term Goals: 2-3 treatments/ 3 days  Short Term Goal 1: pt to tolerate 1/2 hour of therapuetic exercise and activity keeping O2 sats above 90%  Short Term Goal 2: pt to demonstrate good technique for LE strengthening exercises, balance activities and energy conservation techniques  Short Term Goal 3: pt to demonstrate independent bed mobility from a flat surface  Short Term Goal 4: pt to demonstrate independent transfers  Short Term Goal 5: pt to demonstrate independent gait 150' for community distances  Short Term Goal 6: pt to demonstrate good ambulatory balance  Short Term Goal 7: pt to demonstrate ability to ascend/ descend 2 steps w/ left rail w/ supervision for home entry  Patient Goals   Patient Goals : return to dtr's house for 1 week       Education  Patient Education  Education Given To: Patient  Education Provided: Role of Therapy;Plan of Care  Education Method: Demonstration;Verbal  Barriers to Learning: None  Education Outcome: Verbalized understanding;Demonstrated understanding      Therapy Time   Individual Concurrent Group Co-treatment   Time In 0905         Time Out 0928         Minutes 23         Timed Code Treatment Minutes: [facility], PT

## 2023-01-23 NOTE — PROGRESS NOTES
Dr. Lula Vega notified of Na 132 after 500 mL bolus and that respiratory therapy was recommending a nocturnal sleep study. Per Dr. Lula Vega since patient has oxygen at home she is still okay to be discharged.

## 2023-01-24 ENCOUNTER — OFFICE VISIT (OUTPATIENT)
Dept: INTERNAL MEDICINE CLINIC | Age: 78
End: 2023-01-24
Payer: MEDICARE

## 2023-01-24 VITALS — WEIGHT: 120 LBS | DIASTOLIC BLOOD PRESSURE: 82 MMHG | SYSTOLIC BLOOD PRESSURE: 124 MMHG | BODY MASS INDEX: 21.95 KG/M2

## 2023-01-24 DIAGNOSIS — E03.4 HYPOTHYROIDISM DUE TO ACQUIRED ATROPHY OF THYROID: Primary | Chronic | ICD-10-CM

## 2023-01-24 DIAGNOSIS — Z09 HOSPITAL DISCHARGE FOLLOW-UP: ICD-10-CM

## 2023-01-24 DIAGNOSIS — J44.9 CHRONIC OBSTRUCTIVE PULMONARY DISEASE, UNSPECIFIED COPD TYPE (HCC): Chronic | ICD-10-CM

## 2023-01-24 PROCEDURE — 1111F DSCHRG MED/CURRENT MED MERGE: CPT | Performed by: INTERNAL MEDICINE

## 2023-01-24 PROCEDURE — 3023F SPIROM DOC REV: CPT | Performed by: INTERNAL MEDICINE

## 2023-01-24 PROCEDURE — 1090F PRES/ABSN URINE INCON ASSESS: CPT | Performed by: INTERNAL MEDICINE

## 2023-01-24 PROCEDURE — G8400 PT W/DXA NO RESULTS DOC: HCPCS | Performed by: INTERNAL MEDICINE

## 2023-01-24 PROCEDURE — G8427 DOCREV CUR MEDS BY ELIG CLIN: HCPCS | Performed by: INTERNAL MEDICINE

## 2023-01-24 PROCEDURE — 1036F TOBACCO NON-USER: CPT | Performed by: INTERNAL MEDICINE

## 2023-01-24 PROCEDURE — 99213 OFFICE O/P EST LOW 20 MIN: CPT | Performed by: INTERNAL MEDICINE

## 2023-01-24 PROCEDURE — 1123F ACP DISCUSS/DSCN MKR DOCD: CPT | Performed by: INTERNAL MEDICINE

## 2023-01-24 PROCEDURE — G8484 FLU IMMUNIZE NO ADMIN: HCPCS | Performed by: INTERNAL MEDICINE

## 2023-01-24 PROCEDURE — G8420 CALC BMI NORM PARAMETERS: HCPCS | Performed by: INTERNAL MEDICINE

## 2023-01-24 ASSESSMENT — PATIENT HEALTH QUESTIONNAIRE - PHQ9
SUM OF ALL RESPONSES TO PHQ QUESTIONS 1-9: 0
2. FEELING DOWN, DEPRESSED OR HOPELESS: 0
SUM OF ALL RESPONSES TO PHQ QUESTIONS 1-9: 0
1. LITTLE INTEREST OR PLEASURE IN DOING THINGS: 0
SUM OF ALL RESPONSES TO PHQ QUESTIONS 1-9: 0
SUM OF ALL RESPONSES TO PHQ9 QUESTIONS 1 & 2: 0
SUM OF ALL RESPONSES TO PHQ QUESTIONS 1-9: 0

## 2023-01-24 NOTE — PROGRESS NOTES
Patient seen in the office today and stated her only form of home oxygen is her portable machine. Patient reports that this is broken and the [employer] is overnighting her a replacement but will not receive the new machine until tomorrow, 1/25. Patient states she is having a flare up of COPD and is having SOB and her oxygen is lowering. Patient was provided oxygen while in the office and was evaluated by Dr. Marlin Madden. It was discussed that patient could return home if she rested and was on limited activity until she receives her oxygen replacement. Spoke with Sasha Virgen at [facility] to discuss the possibility that patient could be set up with oxygen today. Sasha Virgen verified that since patient is not established with the company, he is unable to get oxygen delivered today. Spoke with patient to clarify if she is established as a patient at any local oxygen companies, she denies. Discussed with patient the conversation listed above, she expressed understanding. She agreed to go home and remain on limited to no activity. She was also advised to monitor oxygen levels and symptoms. Patient advised to report to the ER is she has any worsening SOB or if her oxygen level drops below 90. Patient verbalized understanding and agrees with plan. Patient did also request we start the process of getting an oxygen concentrator for her home. Orders are pending for provider review.

## 2023-01-24 NOTE — PROGRESS NOTES
Post-Discharge Transitional Care  Follow Up      Montse Carvajal   YOB: 1945    Date of Office Visit:  1/24/2023  Date of Hospital Admission: 1/22/23  Date of Hospital Discharge: 1/23/23  Risk of hospital readmission (high >=14%. Medium >=10%) :Readmission Risk Score: 8.2      Care management risk score Rising risk (score 2-5) and Complex Care (Scores >=6): No Risk Score On File     Non face to face  following discharge, date last encounter closed (first attempt may have been earlier): *No documented post hospital discharge outreach found in the last 14 days    Call initiated 2 business days of discharge: *No response recorded in the last 14 days    ASSESSMENT/PLAN:   Hypothyroidism due to acquired atrophy of thyroid  Assessment & Plan:     Orders:  -     TSH With Reflex Ft4; Future  Chronic obstructive pulmonary disease, unspecified COPD type ([facility] [address]  Assessment & Plan:     Orders:  -     DME Order for Home Oxygen as [address] discharge follow-up  -     UT DISCHARGE MEDS RECONCILED W/ CURRENT OUTPATIENT MED LIST    Medical Decision Making: moderate complexity  Return in 3 months (on 4/24/2023). On this date 1/24/2023 I have spent 35 minutes reviewing previous notes, test results and face to face with the patient discussing the diagnosis and importance of compliance with the treatment plan as well as documenting on the day of the visit. Subjective:   HPI:  Follow up of Hospital problems/diagnosis(es): COPD exacerbartion    Inpatient course: Discharge summary reviewed- see chart. Interval history/Current status: This is a 54-year-old female with a history of essential hypertension, COPD, chronic smoking almost quit 5 months ago was smoking half a pack for more than 20 years was recently admitted to the hospital with concern of acute COPD exacerbation.   She was admitted to the hospital with acute on chronic respiratory failure likely secondary to COPD exacerbation, patient was started on IV steroid and his oxygen requirement. Overnight patient symptoms got better she was on baseline oxygen of 2 L and was eventually discharged to home with prednisone for 5 days. She was vaccinated for [facility] 1 dose years ago. No cough, shortness of breath or wheezing. Using 2 L oxygen at home. Overall patient symptoms are much improved she feels a little bit anxious but says her breathing is much better. Patient Active Problem List   Diagnosis    Anemia    Hypothyroidism    COPD (chronic obstructive pulmonary disease) ([facility] [address]    Upper GI bleeding    Duodenal ulcer    Acute midline low back pain without sciatica    Spondylolysis with spondylolisthesis    Spinal stenosis, lumbar region, without neurogenic claudication    Hyperlipidemia    Tobacco abuse    Superficial phlebitis left arm    Viral URI    History of GI bleed    COPD exacerbation (HCC)       Medications listed as ordered at the time of discharge from hospital     Medication List            Accurate as of January 24, 2023 11:59 PM. If you have any questions, ask your nurse or doctor. CHANGE how you take these medications      fluticasone 50 MCG/ACT nasal spray  Commonly known as: FLONASE  1 spray by Each Nostril route daily  What changed:   when to take this  reasons to take this            CONTINUE taking these medications      albuterol sulfate  (90 Base) MCG/ACT inhaler  Commonly known as: PROVENTIL;VENTOLIN;PROAIR  Inhale 2 puffs into the lungs every 4 hours as needed for Wheezing     amLODIPine 5 MG tablet  Commonly known as: NORVASC  Take 1 tablet by mouth in the morning. atorvastatin 20 MG tablet  Commonly known as: LIPITOR  Take 1 tablet by mouth in the morning.      b complex vitamins capsule     guaiFENesin 600 MG extended release tablet  Commonly known as: MUCINEX  Take 2 tablets by mouth 2 times daily     ipratropium-albuterol 0.5-2.5 (3) MG/3ML Soln nebulizer solution  Commonly known as: DUONEB     levothyroxine 75 MCG tablet  Commonly known as: SYNTHROID  Take 1 tablet by mouth daily     lisinopril 10 MG tablet  Commonly known as: PRINIVIL;ZESTRIL  Take 1 tablet by mouth in the morning. Melatonin 5 MG Caps     Multivitamin Adults 50+ Tabs  Take 1 tablet by mouth daily     NEURIVA PO     pantoprazole 40 MG tablet  Commonly known as: PROTONIX  Take 1 tablet by mouth in the morning. predniSONE 20 MG tablet  Commonly known as: DELTASONE  Take 2 tablets by mouth daily for 7 days     Tylenol 325 MG Caps  Generic drug: Acetaminophen     Vitamin D3 50 MCG (2000 UT) Caps                Medications marked \"taking\" at this time  Outpatient Medications Marked as Taking for the 1/24/23 encounter (Office Visit) with Marietta Manning MD   Medication Sig Dispense Refill    levothyroxine (SYNTHROID) 75 MCG tablet Take 1 tablet by mouth daily 30 tablet 5    predniSONE (DELTASONE) 20 MG tablet Take 2 tablets by mouth daily for 7 days 14 tablet 0    guaiFENesin (MUCINEX) 600 MG extended release tablet Take 2 tablets by mouth 2 times daily 40 tablet 0    ipratropium-albuterol (DUONEB) 0.5-2.5 (3) MG/3ML SOLN nebulizer solution Inhale 1 vial into the lungs 4 times daily      albuterol sulfate HFA (PROVENTIL;VENTOLIN;PROAIR) 108 (90 Base) MCG/ACT inhaler Inhale 2 puffs into the lungs every 4 hours as needed for Wheezing 18 g 3    fluticasone (FLONASE) 50 MCG/ACT nasal spray 1 spray by Each Nostril route daily (Patient taking differently: 1 spray by Each Nostril route daily as needed for Rhinitis or Allergies) 32 g 3    lisinopril (PRINIVIL;ZESTRIL) 10 MG tablet Take 1 tablet by mouth in the morning. 90 tablet 3    atorvastatin (LIPITOR) 20 MG tablet Take 1 tablet by mouth in the morning. 90 tablet 3    amLODIPine (NORVASC) 5 MG tablet Take 1 tablet by mouth in the morning. 90 tablet 3    pantoprazole (PROTONIX) 40 MG tablet Take 1 tablet by mouth in the morning.  90 tablet 3    Melatonin 5 MG CAPS Take 5 mg by mouth nightly as needed (sleep)      b complex vitamins capsule Take 1 capsule by mouth daily      Cholecalciferol (VITAMIN D3) 50 MCG (2000 UT) CAPS Take by mouth      Misc Natural Products (NEURIVA PO) Take by mouth      Acetaminophen (TYLENOL) 325 MG CAPS Take by mouth      Multiple Vitamins-Minerals (MULTIVITAMIN ADULTS 50+) TABS Take 1 tablet by mouth daily 100 tablet 3        Medications patient taking as of now reconciled against medications ordered at time of hospital discharge: Yes    A comprehensive review of systems was negative except for what was noted in the HPI. Objective:    /82 (Site: Right Upper Arm)   Wt 120 lb (54.4 kg)   BMI 21.95 kg/m²   General Appearance: alert and oriented to person, place and time, well developed and well- nourished, in no acute distress  Skin: warm and dry, no rash or erythema  Pulmonary/Chest: clear to auscultation bilaterally- no wheezes, rales or rhonchi, normal air movement, no respiratory distress  Cardiovascular: normal rate, regular rhythm, normal S1 and S2, no murmurs, rubs, clicks, or gallops, distal pulses intact, no carotid bruits  Abdomen: soft, non-tender, non-distended, normal bowel sounds, no masses or organomegaly      Plan:  -Patient will get new portable oxygen tomorrow. Advised to continue using current oxygen tank. Patient was advised to avoid any strenuous exercise including more exertion, ADLs since she is running out of oxygen as of this time. We will request the  to get the patient new oxygen tank as early as today. -Advised the patient to complete the course of prednisone, if patient is using more albuterol she might need to start on Symbicort for better COPD management. Patient and daughter is agreeable and understand the current plan. An electronic signature was used to authenticate this note.   --MD Cali Sawyer MD  1/26/2023    EDWINA SEGUNDO [address] [id number].   Phone [phone]   Fax: [phone]  Answering Service: [phone]

## 2023-01-24 NOTE — DISCHARGE SUMMARY
[place] [address] Internal Medicine    Discharge Summary     Patient ID: aZc Rowe  :  1945   MRN: [id number]     ACCOUNT:  [de-identified]   Patient's PCP: Mati Lake MD  Admit Date: 2023   Discharge Date: 23  Length of Stay: 1  Code Status:  Prior  Admitting Physician: Daisy Clark MD  Discharge Physician: Daisy Clark MD     Active Discharge Diagnoses:     Primary Problem  COPD exacerbation [facility])      [place] Problems    Diagnosis Date Noted    COPD exacerbation [facility]) [J44.1] 2023     Priority: Medium       Admission Condition:  fair     Discharged Condition: fair    Hospital Stay:     Hospital Course:  Zac Rowe is a 65 y.o. female who was admitted for the management of COPD exacerbation ([facility] , presented to ER with Shortness of Breath    69-year-old female admitted for acute on chronic hypoxic respiratory failure secondary to COPD exacerbation     Acute on chronic hypoxic hypercapnic respiratory failure patient initially needing 3.5 L nasal cannula oxygen, has 2 L oxygen at home but rarely needs to use it  COPD exacerbation-IV steroids, bronchodilators  Elevated D-dimer-CT chest negative for PE  Hyponatremia-improving with hydration normalized on repeat labs prior to discharge  Hyperkalemia improving with hydration  Hypothyroid-TSH-we will reduce Synthroid from 88 to 75 MCG per day; will need to repeat TSH outpatient  hypertension-lisinopril resumed  Right-sided hydronephrosis on CT-recommend outpatient follow-up     By the afternoon of  patient's breathing had significantly improved, she did not qualify for home oxygen, she was discharged with p.o. steroids and antibiotics      Significant therapeutic interventions: As above    Significant Diagnostic Studies:   Labs / Micro:    Lab Results   Component Value Date    WBC 6.5 2023    HGB 14.5 2023    HCT 43.6 2023    MCV 93.9 2023     01/23/2023          Lab Results   Component Value Date/Time     01/23/2023 05:34 PM    K 5.3 01/23/2023 12:12 PM    CL 92 01/23/2023 12:12 PM    CO2 21 01/23/2023 12:12 PM    BUN 18 01/23/2023 12:12 PM    CREATININE 0.85 01/23/2023 12:12 PM    GLUCOSE 195 01/23/2023 12:12 PM    CALCIUM 9.5 01/23/2023 12:12 PM          Radiology:    XR CHEST PORTABLE    Result Date: 1/22/2023  EXAMINATION: ONE XRAY VIEW OF THE CHEST 1/22/2023 9:08 pm COMPARISON: Chest x-ray June 12, 2022. HISTORY: ORDERING SYSTEM PROVIDED HISTORY: SOB, new O2 requirement TECHNOLOGIST PROVIDED HISTORY: SOB, new O2 requirement Reason for Exam: SOB, new O2 requirement Additional signs and symptoms: SOB, new O2 requirement Relevant Medical/Surgical History: SOB, new O2 requirement FINDINGS: Lungs are hyperaerated. Increased interstitial markings. Heart and mediastinum normal.  Bony thorax intact. COPD. No acute disease. CT CHEST PULMONARY EMBOLISM W CONTRAST    Result Date: 1/22/2023  EXAMINATION: CTA OF THE CHEST 1/22/2023 9:30 pm TECHNIQUE: CTA of the chest was performed after the administration of intravenous contrast.  Multiplanar reformatted images are provided for review. MIP images are provided for review. Automated exposure control, iterative reconstruction, and/or weight based adjustment of the mA/kV was utilized to reduce the radiation dose to as low as reasonably achievable. COMPARISON: Chest x-ray from today. June 4, 2022 renal ultrasound. HISTORY: ORDERING SYSTEM PROVIDED HISTORY: eval for pe TECHNOLOGIST PROVIDED HISTORY: eval for pe Decision Support Exception - unselect if not a suspected or confirmed emergency medical condition->Emergency Medical Condition (MA) Reason for Exam: Shortness of Breath,eval for pe Additional signs and symptoms: hx copd FINDINGS: Pulmonary Arteries: Pulmonary arteries are adequately opacified for evaluation. No evidence of intraluminal filling defect to suggest pulmonary embolism.   Main pulmonary artery is normal in caliber. Mediastinum: No evidence of mediastinal lymphadenopathy. The heart and pericardium demonstrate no acute abnormality. There is no acute abnormality of the thoracic aorta. Lungs/pleura: The lungs are without acute process. No focal consolidation or pulmonary edema. No evidence of pleural effusion or pneumothorax. Upper Abdomen: Severe longstanding right hydronephrosis and or parapelvic cysts. Soft Tissues/Bones: Compression fracture T6, age indeterminate. Severe right hydronephrosis appears chronic. Compression fracture T6, age indeterminate. Otherwise no acute disease. Consultations:    Consults:     Final Specialist Recommendations/Findings:   IP CONSULT TO INTERNAL MEDICINE      The patient was seen and examined on day of discharge and this discharge summary is in conjunction with any daily progress note from day of discharge. Discharge plan:     Disposition: Home      Instructions to Patient: Keep follow-up appointments      Requiring Further Evaluation/Follow Up POST HOSPITALIZATION/Incidental Findings:     Physician Follow Up:     Geoffrey Norman MD  [id number] [facility]  [phone]    Follow up in 1 week(s)      Korey Taylor MD  [address] [facility] [place]  [phone]             Activity: Resume as directed    Discharge Medications:      Medication List        START taking these medications      guaiFENesin 600 MG extended release tablet  Commonly known as: MUCINEX  Take 2 tablets by mouth 2 times daily     predniSONE 20 MG tablet  Commonly known as: DELTASONE  Take 2 tablets by mouth daily for 7 days            CHANGE how you take these medications      albuterol sulfate  (90 Base) MCG/ACT inhaler  Commonly known as: PROVENTIL;VENTOLIN;PROAIR  Inhale 2 puffs into the lungs every 4 hours as needed for Wheezing  What changed: Another medication with the same name was removed.  Continue taking this medication, and follow the directions you see here. levothyroxine 75 MCG tablet  Commonly known as: SYNTHROID  Take 1 tablet by mouth daily  What changed:   medication strength  how much to take  when to take this            CONTINUE taking these medications      amLODIPine 5 MG tablet  Commonly known as: NORVASC  Take 1 tablet by mouth in the morning. atorvastatin 20 MG tablet  Commonly known as: LIPITOR  Take 1 tablet by mouth in the morning. b complex vitamins capsule     ipratropium-albuterol 0.5-2.5 (3) MG/3ML Soln nebulizer solution  Commonly known as: DUONEB     lisinopril 10 MG tablet  Commonly known as: PRINIVIL;ZESTRIL  Take 1 tablet by mouth in the morning. Melatonin 5 MG Caps     Multivitamin Adults 50+ Tabs  Take 1 tablet by mouth daily     NEURIVA PO     pantoprazole 40 MG tablet  Commonly known as: PROTONIX  Take 1 tablet by mouth in the morning. Tylenol 325 MG Caps  Generic drug: Acetaminophen     Vitamin D3 50 MCG (2000 UT) Caps            STOP taking these medications      simvastatin 40 MG tablet  Commonly known as: ZOCOR     tiotropium 18 MCG inhalation capsule  Commonly known as: SPIRIVA            ASK your doctor about these medications      fluticasone 50 MCG/ACT nasal spray  Commonly known as: FLONASE  1 spray by Each Nostril route daily               Where to Get Your Medications        These medications were sent to [facility], [address] [place], [place] [id number]      Phone: [phone]   guaiFENesin 600 MG extended release tablet  levothyroxine 75 MCG tablet  predniSONE 20 MG tablet         Time Spent on discharge is  35 mins in patient examination, evaluation, counseling as well as medication reconciliation, prescriptions for required medications, discharge plan and follow up.     Electronically signed by   Sae Newby MD  1/24/2023  6:34 PM      Thank you Dr. Darius James MD for the opportunity to be involved in this patient's care.

## 2023-01-24 NOTE — PROGRESS NOTES
Discharge instructions reviewed with patient and daughter. Both verbalized understanding and had no further questions. Patient wheeled down to car.

## 2023-01-24 NOTE — PROGRESS NOTES
Physician Progress Note      Bhupinder Girard  CSN #:                  [id number]  :                       1945  ADMIT DATE:       2023 8:40 PM  [address] [place] DATE:        2023 7:25 PM  RESPONDING  PROVIDER #:        Bud Fonseca MD          QUERY TEXT:    Pt admitted w/ COPD exacerbation. Noted documentation of acute respiratory   failure in  H&P. In order to support the diagnosis of acute respiratory   failure, please include additional clinical indicators in your documentation. Or please document if the diagnosis of acute respiratory failure has been r/o   after further study. The medical record reflects the following:  Risk Factors: PMH of COPD, chronic respiratory failure on home O2 PRN, former   smoker. Clinical Indicators: In the setting of above risk factors, ED Provider Note   : presents w/ SOB & headache. Pt initially arrived on RA satting 86%. Pt   now on 3 L O2 satting 96 to 97%. She is not in acute distress. Pulmonary   effort is normal. H&P : presents w/ SOB & is admitted to the hospital for   the management of COPD exacerbation. breathing is significantly improved since   arrival. Acute on chronic hypoxic hypercapnic respiratory failure pt   currently on 3.5 L NC O2, has 2 L O2 at home but rarely needs to use it. RR   15-17. SpO2 93-99% on 2-3.5L O2. CO2 24 (), 21 (  Treatment: Albuterol, Duoneb, Solumedrol.     Acute Respiratory Failure Clinical Indicators per [employer] Training Guide &   Quick Reference Guide:  pO2 < 60 mmHg or SpO2 < 91% breathing RA  pCO2 > 50 & pH < 7.35  P/F ratio (pO2 / FIO2) < 300  pO2 decrease or pCO2 increase by 10 mmHg from baseline  Supplemental O2 of 40% or more  Presence of respiratory distress, tachypnea, dyspnea, SOB, wheezing  Unable to speak in complete sentences  Use of accessory muscles to breathe  Extreme anxiety & feeling of impending doom  Tripod position  Confusion/AMS/obtunded  Options provided:  -- Acute Respiratory Failure as evidenced by, Please document evidence.   -- Acute Respiratory Failure r/o after study & Chronic Respiratory Failure   confirmed  -- Other - I will add my own diagnosis  -- Disagree - Not applicable / Not valid  -- Disagree - Clinically unable to determine / Unknown  -- Refer to Clinical Documentation Reviewer    PROVIDER RESPONSE TEXT:    This pt is in acute respiratory failure as evidenced by Saturation 85% on room   air, shortness of breath and increased effort of breathing while patient was   on room air    Query created by: Ángel Lind on 1/23/2023 12:25 PM      Electronically signed by:  Sola Palencia MD 1/24/2023 5:21 PM

## 2023-01-24 NOTE — PROGRESS NOTES
Visit Information    Have you changed or started any medications since your last visit including any over-the-counter medicines, vitamins, or herbal medicines? no   Are you having any side effects from any of your medications? -  no  Have you stopped taking any of your medications? Is so, why? -  no    Have you seen any other physician or provider since your last visit? No  Have you had any other diagnostic tests since your last visit? No  Have you been seen in the emergency room and/or had an admission to a hospital since we last saw you? Yes - Records Obtained  Have you had your routine dental cleaning in the past 6 months? no    Have you activated your [employer] account? If not, what are your barriers?  Yes     Patient Care Team:  Shon Keys MD as PCP - General (Internal Medicine)  Shon Keys MD as PCP - [facility]    Medical History Review  Past Medical, Family, and Social History reviewed and does not contribute to the patient presenting condition    Health Maintenance   Topic Date Due    Lipids  Never done    Hepatitis C screen  Never done    DTaP/Tdap/Td vaccine (1 - Tdap) Never done    Shingles vaccine (1 of 2) Never done    DEXA (modify frequency per FRAX score)  Never done    Pneumococcal 65+ years Vaccine (2 - PCV) 08/12/2016    COVID-19 Vaccine (2 - Booster for Joanne series) 06/26/2021    Annual Wellness Visit (AWV)  Never done    Flu vaccine (1) 08/01/2022    Depression Screen  06/22/2023    Hepatitis A vaccine  Aged Out    Hib vaccine  Aged Out    Meningococcal (ACWY) vaccine  Aged Out

## 2023-01-30 RX ORDER — ALBUTEROL SULFATE 2.5 MG/3ML
SOLUTION RESPIRATORY (INHALATION)
Qty: 360 ML | Refills: 1 | Status: SHIPPED | OUTPATIENT
Start: 2023-01-30

## 2023-03-07 ENCOUNTER — TELEPHONE (OUTPATIENT)
Dept: INTERNAL MEDICINE CLINIC | Age: 78
End: 2023-03-07

## 2023-03-07 DIAGNOSIS — J06.9 UPPER RESPIRATORY TRACT INFECTION, UNSPECIFIED TYPE: Primary | ICD-10-CM

## 2023-03-07 RX ORDER — AZITHROMYCIN 500 MG/1
500 TABLET, FILM COATED ORAL DAILY
Qty: 1 PACKET | Refills: 0 | Status: SHIPPED | OUTPATIENT
Start: 2023-03-07 | End: 2023-03-10

## 2023-03-07 NOTE — TELEPHONE ENCOUNTER
LV 01/24/2023  NV 05/02/2023    Patient has had a cough for a little over a week. Her mucus is yellow. She has been using OTC medication but would like something called in for her. RA [place]    She did make an appt for the 9th but does not think she can get a ride and will end up cancelling it.

## 2023-03-28 ENCOUNTER — TELEPHONE (OUTPATIENT)
Dept: INTERNAL MEDICINE CLINIC | Age: 78
End: 2023-03-28

## 2023-03-28 RX ORDER — FLUTICASONE FUROATE, UMECLIDINIUM BROMIDE AND VILANTEROL TRIFENATATE 100; 62.5; 25 UG/1; UG/1; UG/1
1 POWDER RESPIRATORY (INHALATION) DAILY
Qty: 1 EACH | Refills: 0 | Status: SHIPPED | OUTPATIENT
Start: 2023-03-28

## 2023-03-28 NOTE — DISCHARGE INSTR - DIET

## 2023-03-28 NOTE — TELEPHONE ENCOUNTER
Patients daughter called informing that while Michelle Ramos had hospitalized back in January physician informed  that if the Spiriva did not have  improvement with her breathing that she could try  Trelegy .  Please advise     [facility] / [place]

## 2023-04-20 ENCOUNTER — HOSPITAL ENCOUNTER (OUTPATIENT)
Age: 78
Setting detail: SPECIMEN
Discharge: HOME | End: 2023-04-20

## 2023-04-20 DIAGNOSIS — E03.4 HYPOTHYROIDISM DUE TO ACQUIRED ATROPHY OF THYROID: Chronic | ICD-10-CM

## 2023-04-20 LAB — TSH SERPL-ACNC: 3.15 UIU/ML (ref 0.3–5)

## 2023-04-21 ENCOUNTER — TELEPHONE (OUTPATIENT)
Dept: INTERNAL MEDICINE CLINIC | Age: 78
End: 2023-04-21

## 2023-04-21 NOTE — TELEPHONE ENCOUNTER
Pt called in about having a cold and wanted recommendations from Dr. Magdalena Zapata on what she should take. I told pt that Dr. Magdalena Zapata was out of office and that the provider that is here can give her some suggestions on what she could take to help her with the congestion she is having. Pt said that she only wants recommendations from Dr. Magdalena Zapata and that she will just wait until her apt on 5/2/23. I advised pt to go to urgent care if her symptoms worsen.
Mass of hand, right  01/04/2019    Active  Adali Gonsalves

## 2023-05-02 ENCOUNTER — OFFICE VISIT (OUTPATIENT)
Dept: INTERNAL MEDICINE CLINIC | Age: 78
End: 2023-05-02
Payer: MEDICARE

## 2023-05-02 VITALS
DIASTOLIC BLOOD PRESSURE: 78 MMHG | HEART RATE: 88 BPM | SYSTOLIC BLOOD PRESSURE: 118 MMHG | BODY MASS INDEX: 21.95 KG/M2 | OXYGEN SATURATION: 96 % | WEIGHT: 120 LBS

## 2023-05-02 DIAGNOSIS — Z13.220 SCREENING FOR HYPERLIPIDEMIA: ICD-10-CM

## 2023-05-02 DIAGNOSIS — E03.4 HYPOTHYROIDISM DUE TO ACQUIRED ATROPHY OF THYROID: Chronic | ICD-10-CM

## 2023-05-02 DIAGNOSIS — Z78.0 POST-MENOPAUSAL: ICD-10-CM

## 2023-05-02 DIAGNOSIS — J44.9 CHRONIC OBSTRUCTIVE PULMONARY DISEASE, UNSPECIFIED COPD TYPE (HCC): Primary | Chronic | ICD-10-CM

## 2023-05-02 DIAGNOSIS — J20.8 ACUTE BRONCHITIS DUE TO OTHER SPECIFIED ORGANISMS: ICD-10-CM

## 2023-05-02 PROBLEM — I80.9 SUPERFICIAL PHLEBITIS: Status: RESOLVED | Noted: 2022-07-20 | Resolved: 2023-05-02

## 2023-05-02 PROBLEM — K92.2 UPPER GI BLEEDING: Status: RESOLVED | Noted: 2022-06-13 | Resolved: 2023-05-02

## 2023-05-02 PROCEDURE — 1090F PRES/ABSN URINE INCON ASSESS: CPT | Performed by: INTERNAL MEDICINE

## 2023-05-02 PROCEDURE — G8400 PT W/DXA NO RESULTS DOC: HCPCS | Performed by: INTERNAL MEDICINE

## 2023-05-02 PROCEDURE — 1123F ACP DISCUSS/DSCN MKR DOCD: CPT | Performed by: INTERNAL MEDICINE

## 2023-05-02 PROCEDURE — G8427 DOCREV CUR MEDS BY ELIG CLIN: HCPCS | Performed by: INTERNAL MEDICINE

## 2023-05-02 PROCEDURE — 3023F SPIROM DOC REV: CPT | Performed by: INTERNAL MEDICINE

## 2023-05-02 PROCEDURE — G8420 CALC BMI NORM PARAMETERS: HCPCS | Performed by: INTERNAL MEDICINE

## 2023-05-02 PROCEDURE — 99213 OFFICE O/P EST LOW 20 MIN: CPT | Performed by: INTERNAL MEDICINE

## 2023-05-02 PROCEDURE — 1036F TOBACCO NON-USER: CPT | Performed by: INTERNAL MEDICINE

## 2023-05-02 RX ORDER — BUDESONIDE AND FORMOTEROL FUMARATE DIHYDRATE 160; 4.5 UG/1; UG/1
2 AEROSOL RESPIRATORY (INHALATION) 2 TIMES DAILY
Qty: 30.6 G | Refills: 1 | Status: SHIPPED | OUTPATIENT
Start: 2023-05-02

## 2023-05-02 RX ORDER — LEVOFLOXACIN 500 MG/1
500 TABLET, FILM COATED ORAL DAILY
Qty: 7 TABLET | Refills: 0 | Status: SHIPPED | OUTPATIENT
Start: 2023-05-02 | End: 2023-05-09

## 2023-05-02 RX ORDER — PREDNISONE 20 MG/1
20 TABLET ORAL DAILY
Qty: 10 TABLET | Refills: 0 | Status: SHIPPED | OUTPATIENT
Start: 2023-05-02 | End: 2023-05-12

## 2023-05-02 SDOH — ECONOMIC STABILITY: HOUSING INSECURITY
IN THE LAST 12 MONTHS, WAS THERE A TIME WHEN YOU DID NOT HAVE A STEADY PLACE TO SLEEP OR SLEPT IN A SHELTER (INCLUDING NOW)?: NO

## 2023-05-02 SDOH — ECONOMIC STABILITY: FOOD INSECURITY: WITHIN THE PAST 12 MONTHS, YOU WORRIED THAT YOUR FOOD WOULD RUN OUT BEFORE YOU GOT MONEY TO BUY MORE.: NEVER TRUE

## 2023-05-02 SDOH — ECONOMIC STABILITY: FOOD INSECURITY: WITHIN THE PAST 12 MONTHS, THE FOOD YOU BOUGHT JUST DIDN'T LAST AND YOU DIDN'T HAVE MONEY TO GET MORE.: NEVER TRUE

## 2023-05-02 SDOH — ECONOMIC STABILITY: INCOME INSECURITY: HOW HARD IS IT FOR YOU TO PAY FOR THE VERY BASICS LIKE FOOD, HOUSING, MEDICAL CARE, AND HEATING?: NOT HARD AT ALL

## 2023-05-02 NOTE — PROGRESS NOTES
Visit Information    Have you changed or started any medications since your last visit including any over-the-counter medicines, vitamins, or herbal medicines? no   Are you having any side effects from any of your medications? -  no  Have you stopped taking any of your medications? Is so, why? -  no    Have you seen any other physician or provider since your last visit? No  Have you had any other diagnostic tests since your last visit? No  Have you been seen in the emergency room and/or had an admission to a hospital since we last saw you? No  Have you had your routine dental cleaning in the past 6 months? no    Have you activated your [employer] account? If not, what are your barriers?  Yes     Patient Care Team:  Amanda Low MD as PCP - General (Internal Medicine)  Amanda Low MD as PCP - Empaneled Provider    Medical History Review  Past Medical, Family, and Social History reviewed and does not contribute to the patient presenting condition    Health Maintenance   Topic Date Due    Lipids  Never done    Hepatitis C screen  Never done    DTaP/Tdap/Td vaccine (1 - Tdap) Never done    Shingles vaccine (1 of 2) Never done    Low dose CT lung screening  Never done    DEXA (modify frequency per FRAX score)  Never done    Pneumococcal 65+ years Vaccine (2 - PCV) 08/12/2016    COVID-19 Vaccine (2 - Booster for Erw[place] series) 06/26/2021    Annual Wellness Visit (AWV)  Never done    Flu vaccine (Season Ended) 08/01/2023    Depression Screen  01/24/2024    Hepatitis A vaccine  Aged Out    Hib vaccine  Aged Out    Meningococcal (ACWY) vaccine  Aged Out
tablet 5    guaiFENesin (MUCINEX) 600 MG extended release tablet Take 2 tablets by mouth 2 times daily 40 tablet 0    ipratropium-albuterol (DUONEB) 0.5-2.5 (3) MG/3ML SOLN nebulizer solution Inhale 3 mLs into the lungs 4 times daily      albuterol sulfate HFA (PROVENTIL;VENTOLIN;PROAIR) 108 (90 Base) MCG/ACT inhaler Inhale 2 puffs into the lungs every 4 hours as needed for Wheezing 18 g 3    fluticasone (FLONASE) 50 MCG/ACT nasal spray 1 spray by Each Nostril route daily (Patient taking differently: 1 spray by Each Nostril route daily as needed for Rhinitis or Allergies) 32 g 3    atorvastatin (LIPITOR) 20 MG tablet Take 1 tablet by mouth in the morning. 90 tablet 3    amLODIPine (NORVASC) 5 MG tablet Take 1 tablet by mouth in the morning. 90 tablet 3    pantoprazole (PROTONIX) 40 MG tablet Take 1 tablet by mouth in the morning. 90 tablet 3    Melatonin 5 MG CAPS Take 5 mg by mouth nightly as needed (sleep)      b complex vitamins capsule Take 1 capsule by mouth daily      Cholecalciferol (VITAMIN D3) 50 MCG (2000 UT) CAPS Take by mouth      Misc Natural Products (NEURIVA PO) Take by mouth      Acetaminophen (TYLENOL) 325 MG CAPS Take by mouth      Multiple Vitamins-Minerals (MULTIVITAMIN ADULTS 50+) TABS Take 1 tablet by mouth daily 100 tablet 3    levoFLOXacin (LEVAQUIN) 500 MG tablet Take 1 tablet by mouth daily for 7 days 7 tablet 0    budesonide-formoterol (SYMBICORT) 160-4.5 MCG/ACT AERO Inhale 2 puffs into the lungs 2 times daily 30.6 g 1     No current facility-administered medications for this visit. ALLERGIES:    No Known Allergies    SOCIAL HISTORY   Reviewed and no change from previous record. Colletta Radish  reports that she quit smoking about a year ago. Her smoking use included cigarettes. She started smoking about 62 years ago. She has a 20.00 pack-year smoking history.  She has never used smokeless tobacco.    FAMILY HISTORY:    Reviewed and No change from previous visit    REVIEW OF SYSTEMS:

## 2023-05-12 ENCOUNTER — TELEPHONE (OUTPATIENT)
Dept: INTERNAL MEDICINE CLINIC | Age: 78
End: 2023-05-12

## 2023-05-12 NOTE — TELEPHONE ENCOUNTER
Patient called asking for something to be called in for her back she hurt it some how. Advised her to go to the urgent care as there were no physicians in the office for an appt.

## 2023-05-22 DIAGNOSIS — J44.9 CHRONIC OBSTRUCTIVE PULMONARY DISEASE, UNSPECIFIED COPD TYPE (HCC): Chronic | ICD-10-CM

## 2023-05-23 ENCOUNTER — TELEPHONE (OUTPATIENT)
Dept: INTERNAL MEDICINE CLINIC | Age: 78
End: 2023-05-23

## 2023-05-23 RX ORDER — ALBUTEROL SULFATE 90 UG/1
AEROSOL, METERED RESPIRATORY (INHALATION)
Qty: 18 G | Refills: 3 | Status: SHIPPED | OUTPATIENT
Start: 2023-05-23

## 2023-05-23 NOTE — TELEPHONE ENCOUNTER
----- Message from Sung Ralph sent at 5/23/2023 10:34 AM EDT -----  Subject: Refill Request    QUESTIONS  Name of Medication? albuterol sulfate HFA (PROVENTIL;VENTOLIN;PROAIR) 108   (90 Base) MCG/ACT inhaler  Patient-reported dosage and instructions? 1x daily  How many days do you have left? 2  Preferred Pharmacy? [id number]  Pharmacy phone number (if available)? [phone]  ---------------------------------------------------------------------------  --------------  CALL BACK INFO  What is the best way for the office to contact you? OK to leave message on   voicemail  Preferred Call Back Phone Number? [phone]  ---------------------------------------------------------------------------  --------------  SCRIPT ANSWERS  Relationship to Patient?  Self

## 2023-05-24 RX ORDER — PANTOPRAZOLE SODIUM 40 MG/1
40 TABLET, DELAYED RELEASE ORAL 2 TIMES DAILY
Qty: 120 TABLET | Refills: 3 | Status: SHIPPED | OUTPATIENT
Start: 2023-05-24 | End: 2023-07-23

## 2023-05-24 RX ORDER — ATORVASTATIN CALCIUM 20 MG/1
20 TABLET, FILM COATED ORAL DAILY
Qty: 90 TABLET | Refills: 3 | Status: SHIPPED | OUTPATIENT
Start: 2023-05-24

## 2023-06-27 RX ORDER — AMLODIPINE BESYLATE 5 MG/1
5 TABLET ORAL DAILY
Qty: 90 TABLET | Refills: 3 | Status: SHIPPED | OUTPATIENT
Start: 2023-06-27

## 2023-06-27 RX ORDER — LISINOPRIL 10 MG/1
10 TABLET ORAL DAILY
Qty: 90 TABLET | Refills: 3 | Status: SHIPPED | OUTPATIENT
Start: 2023-06-27

## 2023-06-28 ENCOUNTER — HOSPITAL ENCOUNTER (EMERGENCY)
Age: 78
Discharge: HOME | End: 2023-06-28
Attending: EMERGENCY MEDICINE
Payer: MEDICARE

## 2023-06-28 ENCOUNTER — APPOINTMENT (OUTPATIENT)
Dept: GENERAL RADIOLOGY | Age: 78
End: 2023-06-28
Payer: MEDICARE

## 2023-06-28 VITALS
RESPIRATION RATE: 20 BRPM | BODY MASS INDEX: 19.63 KG/M2 | HEIGHT: 64 IN | DIASTOLIC BLOOD PRESSURE: 70 MMHG | HEART RATE: 81 BPM | OXYGEN SATURATION: 98 % | TEMPERATURE: 98.4 F | WEIGHT: 115 LBS | SYSTOLIC BLOOD PRESSURE: 168 MMHG

## 2023-06-28 DIAGNOSIS — J44.1 COPD EXACERBATION (HCC): Primary | ICD-10-CM

## 2023-06-28 DIAGNOSIS — R06.02 SHORTNESS OF BREATH: ICD-10-CM

## 2023-06-28 LAB
ALBUMIN SERPL-MCNC: 4.7 G/DL (ref 3.5–5.2)
ALP SERPL-CCNC: 104 U/L (ref 35–104)
ALT SERPL-CCNC: 17 U/L (ref 5–33)
ANION GAP SERPL CALCULATED.3IONS-SCNC: 13 MMOL/L (ref 9–17)
AST SERPL-CCNC: 18 U/L
BASOPHILS # BLD: 0 K/UL (ref 0–0.2)
BASOPHILS NFR BLD: 0 % (ref 0–2)
BILIRUB SERPL-MCNC: 0.5 MG/DL (ref 0.3–1.2)
BNP SERPL-MCNC: 115 PG/ML
BUN SERPL-MCNC: 17 MG/DL (ref 8–23)
CALCIUM SERPL-MCNC: 9.9 MG/DL (ref 8.6–10.4)
CHLORIDE SERPL-SCNC: 98 MMOL/L (ref 98–107)
CO2 SERPL-SCNC: 25 MMOL/L (ref 20–31)
CREAT SERPL-MCNC: 0.68 MG/DL (ref 0.5–0.9)
EOSINOPHIL # BLD: 0.1 K/UL (ref 0–0.4)
EOSINOPHILS RELATIVE PERCENT: 1 % (ref 0–4)
ERYTHROCYTE [DISTWIDTH] IN BLOOD BY AUTOMATED COUNT: 13.8 % (ref 11.5–14.9)
GFR SERPL CREATININE-BSD FRML MDRD: >60 ML/MIN/1.73M2
GLUCOSE SERPL-MCNC: 109 MG/DL (ref 70–99)
HCT VFR BLD AUTO: 46.7 % (ref 36–46)
HGB BLD-MCNC: 15.2 G/DL (ref 12–16)
INR PPP: 0.9
LYMPHOCYTES # BLD: 17 % (ref 24–44)
LYMPHOCYTES NFR BLD: 1.5 K/UL (ref 1–4.8)
MAGNESIUM SERPL-MCNC: 2 MG/DL (ref 1.6–2.6)
MCH RBC QN AUTO: 31.7 PG (ref 26–34)
MCHC RBC AUTO-ENTMCNC: 32.6 G/DL (ref 31–37)
MCV RBC AUTO: 97.2 FL (ref 80–100)
MONOCYTES NFR BLD: 0.6 K/UL (ref 0.1–1.3)
MONOCYTES NFR BLD: 7 % (ref 1–7)
NEUTROPHILS NFR BLD: 75 % (ref 36–66)
NEUTS SEG NFR BLD: 6.7 K/UL (ref 1.3–9.1)
PARTIAL THROMBOPLASTIN TIME: 41.8 SEC (ref 24–36)
PLATELET # BLD AUTO: 280 K/UL (ref 150–450)
PMV BLD AUTO: 8.2 FL (ref 6–12)
POTASSIUM SERPL-SCNC: 4.1 MMOL/L (ref 3.7–5.3)
PROT SERPL-MCNC: 7.6 G/DL (ref 6.4–8.3)
PROTHROMBIN TIME: 12.6 SEC (ref 11.8–14.6)
RBC # BLD AUTO: 4.8 M/UL (ref 4–5.2)
SODIUM SERPL-SCNC: 136 MMOL/L (ref 135–144)
TROPONIN I SERPL HS-MCNC: 12 NG/L (ref 0–14)
WBC OTHER # BLD: 9 K/UL (ref 3.5–11)

## 2023-06-28 PROCEDURE — 96374 THER/PROPH/DIAG INJ IV PUSH: CPT

## 2023-06-28 PROCEDURE — 93005 ELECTROCARDIOGRAM TRACING: CPT | Performed by: EMERGENCY MEDICINE

## 2023-06-28 PROCEDURE — 80053 COMPREHEN METABOLIC PANEL: CPT

## 2023-06-28 PROCEDURE — 83735 ASSAY OF MAGNESIUM: CPT

## 2023-06-28 PROCEDURE — 85027 COMPLETE CBC AUTOMATED: CPT

## 2023-06-28 PROCEDURE — 83880 ASSAY OF NATRIURETIC PEPTIDE: CPT

## 2023-06-28 PROCEDURE — 84484 ASSAY OF TROPONIN QUANT: CPT

## 2023-06-28 PROCEDURE — 85730 THROMBOPLASTIN TIME PARTIAL: CPT

## 2023-06-28 PROCEDURE — 6360000002 HC RX W HCPCS: Performed by: EMERGENCY MEDICINE

## 2023-06-28 PROCEDURE — 2700000000 HC OXYGEN THERAPY PER DAY

## 2023-06-28 PROCEDURE — 71045 X-RAY EXAM CHEST 1 VIEW: CPT

## 2023-06-28 PROCEDURE — 36415 COLL VENOUS BLD VENIPUNCTURE: CPT

## 2023-06-28 PROCEDURE — 85610 PROTHROMBIN TIME: CPT

## 2023-06-28 PROCEDURE — 99285 EMERGENCY DEPT VISIT HI MDM: CPT

## 2023-06-28 PROCEDURE — 94761 N-INVAS EAR/PLS OXIMETRY MLT: CPT

## 2023-06-28 PROCEDURE — 6370000000 HC RX 637 (ALT 250 FOR IP): Performed by: EMERGENCY MEDICINE

## 2023-06-28 PROCEDURE — 94640 AIRWAY INHALATION TREATMENT: CPT

## 2023-06-28 RX ORDER — IPRATROPIUM BROMIDE AND ALBUTEROL SULFATE 2.5; .5 MG/3ML; MG/3ML
1 SOLUTION RESPIRATORY (INHALATION) ONCE
Status: COMPLETED | OUTPATIENT
Start: 2023-06-28 | End: 2023-06-28

## 2023-06-28 RX ORDER — PREDNISONE 50 MG/1
50 TABLET ORAL DAILY
Qty: 5 TABLET | Refills: 0 | Status: SHIPPED | OUTPATIENT
Start: 2023-06-29 | End: 2023-07-04

## 2023-06-28 RX ORDER — AZITHROMYCIN 250 MG/1
250 TABLET, FILM COATED ORAL SEE ADMIN INSTRUCTIONS
Qty: 6 TABLET | Refills: 0 | Status: SHIPPED | OUTPATIENT
Start: 2023-06-28 | End: 2023-07-03

## 2023-06-28 RX ADMIN — METHYLPREDNISOLONE SODIUM SUCCINATE 125 MG: 125 INJECTION, POWDER, FOR SOLUTION INTRAMUSCULAR; INTRAVENOUS at 19:01

## 2023-06-28 RX ADMIN — IPRATROPIUM BROMIDE AND ALBUTEROL SULFATE 1 DOSE: .5; 2.5 SOLUTION RESPIRATORY (INHALATION) at 17:13

## 2023-06-28 ASSESSMENT — ENCOUNTER SYMPTOMS
COUGH: 1
SORE THROAT: 0
EYE PAIN: 0
DIARRHEA: 0
SHORTNESS OF BREATH: 1
EYE REDNESS: 0
BACK PAIN: 0
VOMITING: 0
ABDOMINAL PAIN: 0

## 2023-06-28 ASSESSMENT — PAIN SCALES - GENERAL: PAINLEVEL_OUTOF10: 7

## 2023-06-28 ASSESSMENT — PAIN - FUNCTIONAL ASSESSMENT: PAIN_FUNCTIONAL_ASSESSMENT: 0-10

## 2023-06-29 LAB
EKG ATRIAL RATE: 81 BPM
EKG P AXIS: 70 DEGREES
EKG P-R INTERVAL: 156 MS
EKG Q-T INTERVAL: 372 MS
EKG QRS DURATION: 68 MS
EKG QTC CALCULATION (BAZETT): 432 MS
EKG R AXIS: -22 DEGREES
EKG T AXIS: 45 DEGREES
EKG VENTRICULAR RATE: 81 BPM

## 2023-06-30 ENCOUNTER — TELEPHONE (OUTPATIENT)
Dept: INTERNAL MEDICINE CLINIC | Age: 78
End: 2023-06-30

## 2023-07-03 ENCOUNTER — TELEPHONE (OUTPATIENT)
Dept: INTERNAL MEDICINE CLINIC | Age: 78
End: 2023-07-03

## 2023-07-03 DIAGNOSIS — J44.9 CHRONIC OBSTRUCTIVE PULMONARY DISEASE, UNSPECIFIED COPD TYPE (HCC): Primary | ICD-10-CM

## 2023-07-03 NOTE — TELEPHONE ENCOUNTER
Patient's daughter called needing a referral for Dr. Stew French ( Pulmonology). Daughter states that patient is now completely on oxygen and stats are still below 90 %.     I have pended referral please sign

## 2023-07-05 ENCOUNTER — TELEPHONE (OUTPATIENT)
Dept: INTERNAL MEDICINE CLINIC | Age: 78
End: 2023-07-05

## 2023-07-05 NOTE — TELEPHONE ENCOUNTER
Daughter called stating she is taking her mother to the [facility] instead of trying to get into see  Dr. Cheryl Schaefer

## 2023-07-05 NOTE — TELEPHONE ENCOUNTER
Patients daughter would like to know if you can please order her mom some Ativan for anxiety. Patient is having a hard time sleeping.     Please advise Dr. Chrisandra Holter out of office

## 2023-07-12 RX ORDER — TIOTROPIUM BROMIDE INHALATION SPRAY 1.56 UG/1
SPRAY, METERED RESPIRATORY (INHALATION)
Qty: 12 G | Refills: 2 | Status: SHIPPED | OUTPATIENT
Start: 2023-07-12

## 2023-07-12 RX ORDER — LEVOTHYROXINE SODIUM 0.07 MG/1
TABLET ORAL
Qty: 30 TABLET | Refills: 5 | Status: SHIPPED | OUTPATIENT
Start: 2023-07-12

## 2023-07-19 RX ORDER — LEVOTHYROXINE SODIUM 0.07 MG/1
75 TABLET ORAL DAILY
Qty: 30 TABLET | Refills: 5 | OUTPATIENT
Start: 2023-07-19

## 2023-08-10 RX ORDER — ALBUTEROL SULFATE 2.5 MG/3ML
SOLUTION RESPIRATORY (INHALATION)
Qty: 360 ML | Refills: 1 | Status: SHIPPED | OUTPATIENT
Start: 2023-08-10

## 2023-08-28 DIAGNOSIS — J44.9 CHRONIC OBSTRUCTIVE PULMONARY DISEASE, UNSPECIFIED COPD TYPE (HCC): Chronic | ICD-10-CM

## 2023-08-29 RX ORDER — ALBUTEROL SULFATE 90 UG/1
2 AEROSOL, METERED RESPIRATORY (INHALATION) EVERY 4 HOURS PRN
Qty: 18 G | Refills: 5 | Status: SHIPPED | OUTPATIENT
Start: 2023-08-29

## 2023-08-29 RX ORDER — ALBUTEROL SULFATE 90 UG/1
2 AEROSOL, METERED RESPIRATORY (INHALATION) EVERY 4 HOURS PRN
Qty: 18 G | Refills: 3 | Status: SHIPPED | OUTPATIENT
Start: 2023-08-29

## 2023-08-29 RX ORDER — LEVOTHYROXINE SODIUM 0.07 MG/1
75 TABLET ORAL DAILY
Qty: 30 TABLET | Refills: 5 | Status: SHIPPED | OUTPATIENT
Start: 2023-08-29

## 2023-09-07 ENCOUNTER — HOSPITAL ENCOUNTER (OUTPATIENT)
Dept: PULMONOLOGY | Age: 78
Setting detail: THERAPIES SERIES
Discharge: HOME | End: 2023-09-07

## 2023-09-07 VITALS — BODY MASS INDEX: 22.14 KG/M2 | WEIGHT: 129 LBS | OXYGEN SATURATION: 93 %

## 2023-09-07 ASSESSMENT — PATIENT HEALTH QUESTIONNAIRE - PHQ9
1. LITTLE INTEREST OR PLEASURE IN DOING THINGS: 2
SUM OF ALL RESPONSES TO PHQ QUESTIONS 1-9: 15
7. TROUBLE CONCENTRATING ON THINGS, SUCH AS READING THE NEWSPAPER OR WATCHING TELEVISION: 0
4. FEELING TIRED OR HAVING LITTLE ENERGY: 3
SUM OF ALL RESPONSES TO PHQ QUESTIONS 1-9: 15
SUM OF ALL RESPONSES TO PHQ QUESTIONS 1-9: 15
10. IF YOU CHECKED OFF ANY PROBLEMS, HOW DIFFICULT HAVE THESE PROBLEMS MADE IT FOR YOU TO DO YOUR WORK, TAKE CARE OF THINGS AT HOME, OR GET ALONG WITH OTHER PEOPLE: 1
5. POOR APPETITE OR OVEREATING: 0
8. MOVING OR SPEAKING SO SLOWLY THAT OTHER PEOPLE COULD HAVE NOTICED. OR THE OPPOSITE, BEING SO FIGETY OR RESTLESS THAT YOU HAVE BEEN MOVING AROUND A LOT MORE THAN USUAL: 1
3. TROUBLE FALLING OR STAYING ASLEEP: 3
SUM OF ALL RESPONSES TO PHQ9 QUESTIONS 1 & 2: 5
SUM OF ALL RESPONSES TO PHQ QUESTIONS 1-9: 15
9. THOUGHTS THAT YOU WOULD BE BETTER OFF DEAD, OR OF HURTING YOURSELF: 0
6. FEELING BAD ABOUT YOURSELF - OR THAT YOU ARE A FAILURE OR HAVE LET YOURSELF OR YOUR FAMILY DOWN: 3
2. FEELING DOWN, DEPRESSED OR HOPELESS: 3

## 2023-09-07 ASSESSMENT — EXERCISE STRESS TEST
PEAK_BP: 142/102
PEAK_BP: 142/102
PEAK_HR: 102
PEAK_RPE: 6
PEAK_BP: 142/102
PEAK_RPE: 6
PEAK_HR: 102

## 2023-09-07 ASSESSMENT — PULMONARY FUNCTION TESTS
FEV1/FVC: 53
FVC (LITERS): 1.74
FEV1 (%PREDICTED): 43

## 2023-09-07 ASSESSMENT — LIFESTYLE VARIABLES
ALCOHOL_USE: SPECIAL
SMOKELESS_TOBACCO: NO

## 2023-09-07 NOTE — PLAN OF CARE
Hospital Based Pulmonary Rehab: [address] [facility], 77 y.o. female, -1945 Today's Date: 2023    Qualifying Diagnosis-   Treatment Diagnosis 1: COPD        COPD severity (if applicable): Unspecified      Treatment Diagnosis   Treatment Diagnosis  Treatment Diagnosis 1: COPD  Co-morbidities: Pulmonary disease, Ulcer disease     ITP Visit Type  ITP Visit Type: Initial assessment         Exercise   Exercise  Elizondo Total Score: 20  Stages of Change: Action  Exercise Test: Six minute walk test  Distance Walked in : ft  Distance Walked (ft): 300 ft  Peak HR: 102  Peak BP: 142/102  Peak RPE: 6  O2 Saturation: 93  O2 Flow Rate (l/min): 4 l/min  Stops: 1  SPO2 Range: 93-99  BMI (Calculated): 19.73  FEV1 (%PRED): 43       Exercise Prescription   Exercise Prescription  Mode: Treadmill; Stepper; Ergometer; Other (comment) (free weights)  Frequency per week: 2  Duration Per Session: up to 60 minutes  Intensity METS      : RPD/RPE 1-5  Progression: Pt will start exercise nect week  SpO2: 93 %  O2 Device: Nasal cannula  O2 Flow Rate (l/min): 4 l/min  Symptoms with Exercise: Shortness of breath  Target Heart Rate: 133 (Max HR)  Resistance Training: Yes  Weight: 1  Reps: 5  Assisted Devices: None       Exercise Blood Pressures   Exercise Blood Pressures  Resting BP: 140/106  Peak BP: 142/102  Is BP WDL: Yes       Exercise Intervention   No data recorded   Exercise Education   Exercise Education  Education: Blood pressure medications; Energy conservation; O2 therapy; RPE/RPD scale; Signs/symptoms to report; Understand blood pressure       Exercise target group   Exercise Target Group  Target Goal(s): Individual exercise RX; SaO2>89%  Patient Stated Exercise Goals:  To exercise w/o being too sob         Nutrition   Nutrition  Stages of Change: Maintenance  Diabetes: No       Lipids   No data recorded   Weight Management   Weight Management  Weight : 129 lb (58.5

## 2023-09-11 ENCOUNTER — HOSPITAL ENCOUNTER (OUTPATIENT)
Dept: PULMONOLOGY | Age: 78
Setting detail: THERAPIES SERIES
Discharge: HOME | End: 2023-09-11
Payer: MEDICARE

## 2023-09-11 ENCOUNTER — TELEPHONE (OUTPATIENT)
Dept: INTERNAL MEDICINE CLINIC | Age: 78
End: 2023-09-11

## 2023-09-11 VITALS — BODY MASS INDEX: 22.49 KG/M2 | WEIGHT: 131 LBS

## 2023-09-11 PROCEDURE — 94625 PHY/QHP OP PULM RHB W/O MNTR: CPT

## 2023-09-11 ASSESSMENT — EXERCISE STRESS TEST
PEAK_RPE: 5
PEAK_METS: 1.7
PEAK_BP: 140/90
PEAK_HR: 103

## 2023-09-11 NOTE — TELEPHONE ENCOUNTER
Patient was seen at Pulmonary Rehab last week and they performed PHQ9 on her and she scored a 15. Patient states she has high anxiety. Is there any medication that will help with her anxiety? Patient would like to start on something.      GURPREET Paniagua    Please advise

## 2023-09-12 ENCOUNTER — OFFICE VISIT (OUTPATIENT)
Dept: INTERNAL MEDICINE CLINIC | Age: 78
End: 2023-09-12
Payer: MEDICARE

## 2023-09-12 VITALS
OXYGEN SATURATION: 97 % | HEART RATE: 95 BPM | DIASTOLIC BLOOD PRESSURE: 80 MMHG | SYSTOLIC BLOOD PRESSURE: 138 MMHG | WEIGHT: 128 LBS | BODY MASS INDEX: 21.97 KG/M2

## 2023-09-12 DIAGNOSIS — F51.01 PRIMARY INSOMNIA: ICD-10-CM

## 2023-09-12 DIAGNOSIS — E03.4 HYPOTHYROIDISM DUE TO ACQUIRED ATROPHY OF THYROID: ICD-10-CM

## 2023-09-12 DIAGNOSIS — F41.1 GAD (GENERALIZED ANXIETY DISORDER): ICD-10-CM

## 2023-09-12 DIAGNOSIS — J44.9 CHRONIC OBSTRUCTIVE PULMONARY DISEASE, UNSPECIFIED COPD TYPE (HCC): Primary | Chronic | ICD-10-CM

## 2023-09-12 PROCEDURE — G8420 CALC BMI NORM PARAMETERS: HCPCS | Performed by: INTERNAL MEDICINE

## 2023-09-12 PROCEDURE — G8400 PT W/DXA NO RESULTS DOC: HCPCS | Performed by: INTERNAL MEDICINE

## 2023-09-12 PROCEDURE — 3023F SPIROM DOC REV: CPT | Performed by: INTERNAL MEDICINE

## 2023-09-12 PROCEDURE — G8427 DOCREV CUR MEDS BY ELIG CLIN: HCPCS | Performed by: INTERNAL MEDICINE

## 2023-09-12 PROCEDURE — 1036F TOBACCO NON-USER: CPT | Performed by: INTERNAL MEDICINE

## 2023-09-12 PROCEDURE — 1123F ACP DISCUSS/DSCN MKR DOCD: CPT | Performed by: INTERNAL MEDICINE

## 2023-09-12 PROCEDURE — 1090F PRES/ABSN URINE INCON ASSESS: CPT | Performed by: INTERNAL MEDICINE

## 2023-09-12 PROCEDURE — 99214 OFFICE O/P EST MOD 30 MIN: CPT | Performed by: INTERNAL MEDICINE

## 2023-09-12 RX ORDER — LANOLIN ALCOHOL/MO/W.PET/CERES
3 CREAM (GRAM) TOPICAL DAILY
Qty: 30 TABLET | Refills: 3 | Status: SHIPPED | OUTPATIENT
Start: 2023-09-12

## 2023-09-12 RX ORDER — BUSPIRONE HYDROCHLORIDE 15 MG/1
15 TABLET ORAL 2 TIMES DAILY
Qty: 60 TABLET | Refills: 0 | Status: SHIPPED | OUTPATIENT
Start: 2023-09-12 | End: 2023-10-12

## 2023-09-12 NOTE — PROGRESS NOTES
Visit Information    Have you changed or started any medications since your last visit including any over-the-counter medicines, vitamins, or herbal medicines? no   Are you having any side effects from any of your medications? -  no  Have you stopped taking any of your medications? Is so, why? -  no    Have you seen any other physician or provider since your last visit? Yes - Records Obtained  Have you had any other diagnostic tests since your last visit? Yes - Records Obtained  Have you been seen in the emergency room and/or had an admission to a hospital since we last saw you? No  Have you had your routine dental cleaning in the past 6 months? no    Have you activated your [employer] account? If not, what are your barriers?  Yes     Patient Care Team:  Claudeen Sauer, MD as PCP - General (Internal Medicine)  Claudeen Sauer, MD as PCP - Empaneled Provider    Medical History Review  Past Medical, Family, and Social History reviewed and does contribute to the patient presenting condition    Health Maintenance   Topic Date Due    Lipids  Never done    Hepatitis C screen  Never done    DTaP/Tdap/Td vaccine (1 - Tdap) Never done    Shingles vaccine (1 of 2) Never done    Low dose CT lung screening &/or counseling  Never done    DEXA (modify frequency per FRAX score)  Never done    Pneumococcal 65+ years Vaccine (2 - PCV) 08/12/2016    COVID-19 Vaccine (2 - Booster for Joanne series) 06/26/2021    Annual Wellness Visit (AWV)  Never done    Flu vaccine (1) 08/01/2023    Depression Monitoring  09/07/2024    Hepatitis A vaccine  Aged Out    Hepatitis B vaccine  Aged Out    Hib vaccine  Aged Out    Meningococcal (ACWY) vaccine  Aged Out    Depression Screen  Discontinued
to ensure the accuracy of this automated transcription, some errors in transcription may have occurred.

## 2023-09-13 ENCOUNTER — HOSPITAL ENCOUNTER (OUTPATIENT)
Dept: PULMONOLOGY | Age: 78
Setting detail: THERAPIES SERIES
Discharge: HOME | End: 2023-09-13
Payer: MEDICARE

## 2023-09-13 VITALS — WEIGHT: 129 LBS | BODY MASS INDEX: 22.14 KG/M2

## 2023-09-13 PROCEDURE — 94625 PHY/QHP OP PULM RHB W/O MNTR: CPT

## 2023-09-13 ASSESSMENT — ENCOUNTER SYMPTOMS
COLOR CHANGE: 0
CONSTIPATION: 0
CHEST TIGHTNESS: 0
EYE ITCHING: 0
CHOKING: 0
SHORTNESS OF BREATH: 1
EYE REDNESS: 0
COUGH: 0
BACK PAIN: 0
DIARRHEA: 0
APNEA: 0
EYE DISCHARGE: 0
EYE PAIN: 0
ABDOMINAL PAIN: 0
ABDOMINAL DISTENTION: 0
BLOOD IN STOOL: 0

## 2023-09-13 ASSESSMENT — EXERCISE STRESS TEST
PEAK_RPE: 5
PEAK_METS: 2.1
PEAK_HR: 104
PEAK_BP: 140/102

## 2023-09-18 ENCOUNTER — HOSPITAL ENCOUNTER (OUTPATIENT)
Dept: PULMONOLOGY | Age: 78
Setting detail: THERAPIES SERIES
Discharge: HOME | End: 2023-09-18
Payer: MEDICARE

## 2023-09-20 ENCOUNTER — HOSPITAL ENCOUNTER (OUTPATIENT)
Dept: PULMONOLOGY | Age: 78
Setting detail: THERAPIES SERIES
End: 2023-09-20
Payer: MEDICARE

## 2023-09-25 ENCOUNTER — HOSPITAL ENCOUNTER (OUTPATIENT)
Dept: PULMONOLOGY | Age: 78
Setting detail: THERAPIES SERIES
Discharge: HOME | End: 2023-09-25
Payer: MEDICARE

## 2023-09-25 VITALS — BODY MASS INDEX: 22.31 KG/M2 | WEIGHT: 130 LBS

## 2023-09-25 PROCEDURE — 94625 PHY/QHP OP PULM RHB W/O MNTR: CPT

## 2023-09-25 ASSESSMENT — EXERCISE STRESS TEST
PEAK_METS: 2.1
PEAK_HR: 105
PEAK_BP: 142/92
PEAK_RPE: 5

## 2023-09-27 ENCOUNTER — HOSPITAL ENCOUNTER (OUTPATIENT)
Dept: PULMONOLOGY | Age: 78
Setting detail: THERAPIES SERIES
Discharge: HOME | End: 2023-09-27
Payer: MEDICARE

## 2023-09-27 PROCEDURE — 94625 PHY/QHP OP PULM RHB W/O MNTR: CPT

## 2023-09-27 ASSESSMENT — EXERCISE STRESS TEST
PEAK_BP: 140/106
PEAK_METS: 2.1
PEAK_HR: 106
PEAK_RPE: 5

## 2023-09-27 NOTE — PROGRESS NOTES
*Instructed pt on exercise equipment use & safety. *Reviewed reconditioning exercises w/ application of PLB DB &RB  *Started exercise w/ use of proper breathing techniques ,PAOLA scales & pacing work. .  *Instruct on lung diagnosis & disease process. O2 use & respiratory medications dose,    frequency & tech. To help alleviate symptoms.

## 2023-09-28 VITALS — WEIGHT: 131 LBS | OXYGEN SATURATION: 97 % | BODY MASS INDEX: 22.49 KG/M2

## 2023-09-28 ASSESSMENT — EXERCISE STRESS TEST
PEAK_METS: 2.1
PEAK_HR: 104
PEAK_BP: 140/102
PEAK_RPE: 5
PEAK_BP: 140/102

## 2023-09-28 NOTE — PLAN OF CARE
Nasal canula  Patients Liter Flow : 4  O2 Sat Greater Than 90%: Yes  Nurse/Patient Discussion : Pt would like to try taking the O2 off at rest  Oxygen Education : Oxygen Safety / Azael Maynard; Traveling with Oxygen; Proper care of Oxygen Equipment; Emergency Oxygen useage  Oxygen Target Goals : Understand use of oxygen; Discontinue oxygen useage; Decrease liters required; Use oxygen as needed; Keep SA02 greater than 90%  Patient Stated Goals : Pt would like to wear O2 prn       Individual Treatment Plan   Individual Treatment Plan  ITP Visit Type: Re-assessment  1st Date of Exercise: 09/11/23  Visit #/Total Visits: 4/36  FVC (Liters): 1.74 liters  FEV1 (%PRED): 43  FEV1/FVC: 53  DLCO (mL/mmHg sec): 31 ml/mmHg sec  Pulmonary ITP: Exercise; Psychosocial; Tobacco; Nutrition; Education           Education   Education  Learning Barrier: Ready to learn  Pulmonary Total Score: 70%       Education Intervention   Education Intervention  Education Schedule Given: Yes       Patient Education   Patient Education  Education: Activities of daily living; Breaking the dyspnea cycle; Med compliance       Education Target Goals   Education Target Goals  Target Goals: Correct demonstration of breathing techniques       Physician Response   No data recorded         Comments: Agree with above findings and plan    Physician's Signature: Harvey William MD  Date: September 29, 2023

## 2023-10-02 ENCOUNTER — HOSPITAL ENCOUNTER (OUTPATIENT)
Dept: PULMONOLOGY | Age: 78
Setting detail: THERAPIES SERIES
Discharge: HOME | End: 2023-10-02
Payer: MEDICARE

## 2023-10-02 VITALS — BODY MASS INDEX: 22.49 KG/M2 | WEIGHT: 131 LBS

## 2023-10-02 PROCEDURE — 94625 PHY/QHP OP PULM RHB W/O MNTR: CPT

## 2023-10-02 ASSESSMENT — EXERCISE STRESS TEST
PEAK_RPE: 3
PEAK_HR: 101
PEAK_METS: 2.1
PEAK_BP: 128/81

## 2023-10-04 ENCOUNTER — APPOINTMENT (OUTPATIENT)
Dept: PULMONOLOGY | Age: 78
End: 2023-10-04
Payer: MEDICARE

## 2023-10-07 DIAGNOSIS — F41.1 GAD (GENERALIZED ANXIETY DISORDER): ICD-10-CM

## 2023-10-09 ENCOUNTER — HOSPITAL ENCOUNTER (OUTPATIENT)
Dept: PULMONOLOGY | Age: 78
Setting detail: THERAPIES SERIES
Discharge: HOME | End: 2023-10-09
Payer: MEDICARE

## 2023-10-09 VITALS — WEIGHT: 129 LBS | BODY MASS INDEX: 22.14 KG/M2

## 2023-10-09 PROCEDURE — 94625 PHY/QHP OP PULM RHB W/O MNTR: CPT

## 2023-10-09 RX ORDER — BUSPIRONE HYDROCHLORIDE 15 MG/1
15 TABLET ORAL 2 TIMES DAILY
Qty: 60 TABLET | Refills: 0 | Status: SHIPPED | OUTPATIENT
Start: 2023-10-09

## 2023-10-09 ASSESSMENT — EXERCISE STRESS TEST
PEAK_HR: 111
PEAK_BP: 140/90
PEAK_METS: 2.2
PEAK_RPE: 4

## 2023-10-11 ENCOUNTER — APPOINTMENT (OUTPATIENT)
Dept: PULMONOLOGY | Age: 78
End: 2023-10-11
Payer: MEDICARE

## 2023-10-16 ENCOUNTER — APPOINTMENT (OUTPATIENT)
Dept: PULMONOLOGY | Age: 78
End: 2023-10-16
Payer: MEDICARE

## 2023-10-18 ENCOUNTER — APPOINTMENT (OUTPATIENT)
Dept: PULMONOLOGY | Age: 78
End: 2023-10-18
Payer: MEDICARE

## 2023-10-23 ENCOUNTER — HOSPITAL ENCOUNTER (OUTPATIENT)
Dept: PULMONOLOGY | Age: 78
Setting detail: THERAPIES SERIES
Discharge: HOME | End: 2023-10-23
Payer: MEDICARE

## 2023-10-24 VITALS — OXYGEN SATURATION: 97 %

## 2023-10-24 ASSESSMENT — EXERCISE STRESS TEST
PEAK_RPE: 4
PEAK_HR: 110
PEAK_METS: 2.2
PEAK_BP: 140/92
PEAK_BP: 140/92

## 2023-10-24 NOTE — PLAN OF CARE
Hospital Based Pulmonary Rehab: [address] [facility], 77 y.o. female, -1945 Today's Date: 10/24/2023    Qualifying Diagnosis-   Treatment Diagnosis 1: COPD      No data recorded     COPD severity (if applicable): Unspecified    Sessions Completed To-Date:      Treatment Diagnosis   Treatment Diagnosis  Treatment Diagnosis 1: COPD  Co-morbidities: Pulmonary disease, Ulcer disease     ITP Visit Type  ITP Visit Type: Discharge, did not complete program         Exercise   Exercise  Elizondo Total Score: 20  Stages of Change: Relapse; Other (comment) (pt asked to be discharged)  Exercise Test: Six minute walk test  Distance Walked in : ft  Distance Walked (ft): 300 ft  Peak HR: 110  Peak BP: 140/92  Peak RPE: 4  Peak Mets: 2.2  O2 Saturation: 97  O2 Flow Rate (l/min): 4 l/min  Stops: 1  SPO2 Range: 93-99  BMI (Calculated): 19.73  FEV1 (%PRED): 43       Exercise Prescription   Exercise Prescription  Mode: Treadmill; Stepper; Ergometer; Other (comment) (free weights)  Frequency per week: 2  Duration Per Session: up to 60 minutes  Intensity METS      : RPD/RPE 1-5  Progression: pt asked to be discharged  SpO2: 97 %  O2 Device: Nasal cannula  O2 Flow Rate (l/min): 4 l/min  Symptoms with Exercise: Shortness of breath  Target Heart Rate: 133 (Max HR)  Resistance Training: Yes  Weight: 1  Reps: 1/5  Assisted Devices: None       Exercise Blood Pressures   Exercise Blood Pressures  Resting BP: 134/84  Peak BP: 140/92  Is BP WDL: Yes       Exercise Intervention   Exercise Intervention  Type: Nustep, UBE and free weights  Frequency: 2/week  Duration: 60-70 minutes  Resistance Training: Yes  Comments: Pt is doing 3 sets of 7 minutes on wl of 1, 4 minutes on UBE       Exercise Education   Exercise Education  Education: Home exercise plan; O2 therapy; Purse lip/abdominal breathing; Signs/symptoms to report;  Understand blood pressure       Exercise target group   Exercise

## 2023-10-24 NOTE — PROGRESS NOTES
Pt has not completed program and asked to be discharged, due to increased anxiety from her sob and knee pain. She understands that she can come back in the future.

## 2023-10-25 ENCOUNTER — HOSPITAL ENCOUNTER (OUTPATIENT)
Dept: PULMONOLOGY | Age: 78
Setting detail: THERAPIES SERIES
End: 2023-10-25
Payer: MEDICARE

## 2023-10-30 ENCOUNTER — APPOINTMENT (OUTPATIENT)
Dept: PULMONOLOGY | Age: 78
End: 2023-10-30
Payer: MEDICARE

## 2023-10-30 RX ORDER — BUDESONIDE AND FORMOTEROL FUMARATE DIHYDRATE 160; 4.5 UG/1; UG/1
AEROSOL RESPIRATORY (INHALATION)
Qty: 30.6 G | Refills: 1 | Status: SHIPPED | OUTPATIENT
Start: 2023-10-30

## 2023-11-05 DIAGNOSIS — F41.1 GAD (GENERALIZED ANXIETY DISORDER): ICD-10-CM

## 2023-11-06 RX ORDER — BUSPIRONE HYDROCHLORIDE 15 MG/1
15 TABLET ORAL 2 TIMES DAILY
Qty: 60 TABLET | Refills: 0 | Status: SHIPPED | OUTPATIENT
Start: 2023-11-06

## 2023-11-10 ENCOUNTER — TELEPHONE (OUTPATIENT)
Dept: INTERNAL MEDICINE CLINIC | Age: 78
End: 2023-11-10

## 2023-12-03 DIAGNOSIS — F41.1 GAD (GENERALIZED ANXIETY DISORDER): ICD-10-CM

## 2023-12-04 RX ORDER — BUSPIRONE HYDROCHLORIDE 15 MG/1
15 TABLET ORAL 2 TIMES DAILY
Qty: 60 TABLET | Refills: 0 | Status: SHIPPED | OUTPATIENT
Start: 2023-12-04 | End: 2023-12-29

## 2023-12-29 DIAGNOSIS — F41.1 GAD (GENERALIZED ANXIETY DISORDER): ICD-10-CM

## 2023-12-29 RX ORDER — BUSPIRONE HYDROCHLORIDE 15 MG/1
15 TABLET ORAL 2 TIMES DAILY
Qty: 60 TABLET | Refills: 0 | Status: SHIPPED | OUTPATIENT
Start: 2023-12-29

## 2024-01-08 RX ORDER — PANTOPRAZOLE SODIUM 40 MG/1
TABLET, DELAYED RELEASE ORAL
Qty: 120 TABLET | Refills: 3 | Status: SHIPPED | OUTPATIENT
Start: 2024-01-08

## 2024-01-09 DIAGNOSIS — F41.1 GAD (GENERALIZED ANXIETY DISORDER): ICD-10-CM

## 2024-01-09 RX ORDER — BUSPIRONE HYDROCHLORIDE 15 MG/1
15 TABLET ORAL 2 TIMES DAILY
Qty: 60 TABLET | Refills: 0 | Status: SHIPPED | OUTPATIENT
Start: 2024-01-09

## 2024-02-23 RX ORDER — ALBUTEROL SULFATE 2.5 MG/3ML
SOLUTION RESPIRATORY (INHALATION)
Qty: 360 ML | Refills: 11 | Status: SHIPPED | OUTPATIENT
Start: 2024-02-23

## 2024-02-23 RX ORDER — ALBUTEROL SULFATE 2.5 MG/3ML
2.5 SOLUTION RESPIRATORY (INHALATION) EVERY 6 HOURS PRN
Qty: 360 ML | Refills: 1 | Status: SHIPPED | OUTPATIENT
Start: 2024-02-23

## 2024-02-28 DIAGNOSIS — F41.1 GAD (GENERALIZED ANXIETY DISORDER): ICD-10-CM

## 2024-02-29 RX ORDER — BUSPIRONE HYDROCHLORIDE 15 MG/1
15 TABLET ORAL 2 TIMES DAILY
Qty: 60 TABLET | Refills: 0 | Status: SHIPPED | OUTPATIENT
Start: 2024-02-29

## 2024-03-27 DIAGNOSIS — F41.1 GAD (GENERALIZED ANXIETY DISORDER): ICD-10-CM

## 2024-03-27 RX ORDER — BUSPIRONE HYDROCHLORIDE 15 MG/1
15 TABLET ORAL 2 TIMES DAILY
Qty: 60 TABLET | Refills: 0 | Status: SHIPPED | OUTPATIENT
Start: 2024-03-27

## 2024-04-10 ENCOUNTER — TELEPHONE (OUTPATIENT)
Dept: INTERNAL MEDICINE CLINIC | Age: 79
End: 2024-04-10

## 2024-04-10 NOTE — TELEPHONE ENCOUNTER
----- Message from Gini Harp sent at 4/10/2024  2:39 PM EDT -----  Subject: Appointment Request    Reason for Call: Established Patient Appointment needed: Routine Medicare   AWV    QUESTIONS    Reason for appointment request? No appointments available during search     Additional Information for Provider? patient's daughter Liane called to   schedule patient's AWV with Dr Minor, daughter would like to schedule   around 6/3 or 6/10  ---------------------------------------------------------------------------  --------------  CALL BACK INFO  [phone]; OK to leave message on voicemail  ---------------------------------------------------------------------------  --------------  SCRIPT ANSWERS

## 2024-04-15 DIAGNOSIS — F41.1 GAD (GENERALIZED ANXIETY DISORDER): ICD-10-CM

## 2024-04-15 RX ORDER — PANTOPRAZOLE SODIUM 40 MG/1
TABLET, DELAYED RELEASE ORAL
Qty: 120 TABLET | Refills: 3 | Status: SHIPPED | OUTPATIENT
Start: 2024-04-15

## 2024-04-15 RX ORDER — LEVOTHYROXINE SODIUM 0.07 MG/1
75 TABLET ORAL DAILY
Qty: 90 TABLET | Refills: 3 | Status: SHIPPED | OUTPATIENT
Start: 2024-04-15

## 2024-04-15 RX ORDER — BUSPIRONE HYDROCHLORIDE 15 MG/1
15 TABLET ORAL 2 TIMES DAILY
Qty: 180 TABLET | Refills: 3 | Status: SHIPPED | OUTPATIENT
Start: 2024-04-15

## 2024-04-15 RX ORDER — BUDESONIDE AND FORMOTEROL FUMARATE DIHYDRATE 160; 4.5 UG/1; UG/1
2 AEROSOL RESPIRATORY (INHALATION) 2 TIMES DAILY
Qty: 30.6 G | Refills: 3 | Status: SHIPPED | OUTPATIENT
Start: 2024-04-15

## 2024-04-15 NOTE — TELEPHONE ENCOUNTER
[facility] faxed refill request for Buspirone     Last office visit 9/12/2023

## 2024-04-22 RX ORDER — ALBUTEROL SULFATE 90 UG/1
2 AEROSOL, METERED RESPIRATORY (INHALATION) EVERY 4 HOURS PRN
Qty: 18 G | Refills: 1 | Status: SHIPPED | OUTPATIENT
Start: 2024-04-22

## 2024-04-23 DIAGNOSIS — F41.1 GAD (GENERALIZED ANXIETY DISORDER): ICD-10-CM

## 2024-04-25 RX ORDER — BUSPIRONE HYDROCHLORIDE 15 MG/1
15 TABLET ORAL 2 TIMES DAILY
Qty: 180 TABLET | Refills: 3 | Status: SHIPPED | OUTPATIENT
Start: 2024-04-25

## 2024-06-12 RX ORDER — ATORVASTATIN CALCIUM 20 MG/1
20 TABLET, FILM COATED ORAL DAILY
Qty: 90 TABLET | Refills: 3 | Status: SHIPPED | OUTPATIENT
Start: 2024-06-12

## 2024-06-20 RX ORDER — ALBUTEROL SULFATE 2.5 MG/3ML
SOLUTION RESPIRATORY (INHALATION)
Qty: 360 ML | Refills: 5 | Status: SHIPPED | OUTPATIENT
Start: 2024-06-20 | End: 2024-12-17

## 2024-06-20 RX ORDER — ALBUTEROL SULFATE 90 UG/1
2 AEROSOL, METERED RESPIRATORY (INHALATION) EVERY 4 HOURS PRN
Qty: 1 EACH | Refills: 5 | Status: SHIPPED | OUTPATIENT
Start: 2024-06-20 | End: 2024-12-17

## 2024-06-20 RX ORDER — TIOTROPIUM BROMIDE INHALATION SPRAY 1.56 UG/1
SPRAY, METERED RESPIRATORY (INHALATION)
Qty: 12 G | Refills: 0 | Status: SHIPPED | OUTPATIENT
Start: 2024-06-20

## 2024-07-25 ENCOUNTER — TELEPHONE (OUTPATIENT)
Dept: INTERNAL MEDICINE CLINIC | Age: 79
End: 2024-07-25

## 2024-08-13 NOTE — CONSULTS
GI Consult Note:    Name: Isaac Adams  MRN: [id number]     Acct: [de-identified]  Room: [facility] ENDO Pool/NONE    Admit Date: 6/12/2022  PCP: Cory Cordova MD    Physician Requesting Consult: Сергей Chaudhry MD     Reason for Consult: Severe anemia abdominal discomfort    Chief Complaint:     Chief Complaint   Patient presents with    Fatigue    Shortness of Breath       History Obtained From:     patient, family member -patient's son, electronic medical record    History of Present Illness:      Isaac Adams is a  65 y.o.  female who presents with Fatigue and Shortness of Breath      Symptoms:  Patient was admitted to the hospital with weakness, tiredness, fatigability and shortness of breath. She was found to have significant anemia with hemoglobin around 4. Subsequently she is admitted to the hospital.    On further discussion patient states that she had history of significant back pain in the last couple of months and was taking excessive NSAIDs. The NSAIDs were relieving her back pain however she developed abdominal discomfort. This discomfort appears to be mostly in the upper abdomen. She does have nausea. Has a markedly decreased appetite in the last couple of months. No dysphagia. Does help heartburns. Not known to have ulcer disease or liver disease in the past.  She has significant constipation. Not clear whether she has melena hematochezia. Other positive medical history reviewed. During the interview patient denies chest pain, palpitations etc.        Also reviewed GA appearance history which is as follows:    76-year-old female presents to ED with abdominal pain, fatigue. Onset about 3 weeks ago. Patient states that she was prescribed Motrin 600 mg for back injury that occurred 1 month ago. Her hgb at that time was 15.5. Patient has been taking Motrin approximately 3 times a day for the last three weeks. Recently she has been experiencing abdominal pain, nausea.   Also her stools have been dark x 1 week. Reports constipation. No fevers, chills, chest pain, hematemesis, diarrhea. No dysphagia, odynophagia, dyspeptic symptoms. No weight loss.     Hgb on admission 4.4. She received 4 units of PRBCs. Hgb presently 7.4. FOBT + in ED. Has been on PPI drip  No BM overnight. No previous EGD or colonoscopy. No family hx of colon, stomach cancer. Patient reports drinking 2-3 glasses of wine daily for last several years. No prior hx of liver disease. Current smoker    PMHX includes HTN, COPD, hypothyroidism. Past Medical History:     Past Medical History:   Diagnosis Date    COPD (chronic obstructive pulmonary disease) ([facility] [address]     Hyperlipidemia     Hypertension     Kidney stone     Thyroid disease         Past Surgical History:     History reviewed. No pertinent surgical history. Medications Prior to Admission:       Prior to Admission medications    Medication Sig Start Date End Date Taking? Authorizing Provider   levothyroxine (SYNTHROID) 88 MCG tablet Take 88 mcg by mouth Daily   Yes Historical Provider, MD   amLODIPine (NORVASC) 5 MG tablet Take 5 mg by mouth daily   Yes Historical Provider, MD   atorvastatin (LIPITOR) 20 MG tablet Take 20 mg by mouth daily   Yes Historical Provider, MD   lisinopril (PRINIVIL;ZESTRIL) 10 MG tablet Take 10 mg by mouth daily   Yes Historical Provider, MD   tiotropium (SPIRIVA) 18 MCG inhalation capsule Inhale 18 mcg into the lungs daily   Yes Historical Provider, MD   albuterol (PROVENTIL) (2.5 MG/3ML) 0.083% nebulizer solution Take 2.5 mg by nebulization every 6 hours as needed for Wheezing   Yes Historical Provider, MD        Allergies:       Patient has no known allergies. Social History:     Tobacco:    reports that she has been smoking cigarettes. She has never used smokeless tobacco.  Alcohol:      reports current alcohol use of about 2.0 standard drinks of alcohol per week. Drug Use: reports no history of drug use.     Family History:     History reviewed. No pertinent family history. Review of Systems:     Review of Systems   Constitutional: Positive for appetite change and fatigue. Respiratory: Positive for shortness of breath. Gastrointestinal: Positive for abdominal pain, constipation and nausea. Negative for diarrhea and vomiting. Neurological: Positive for dizziness and weakness. Psychiatric/Behavioral: The patient is nervous/anxious. Code Status:  Full Code    Physical Exam:     Vitals:  /70   Pulse 85   Temp 98.7 °F (37.1 °C) (Temporal)   Resp 18   Ht 5' 2\" (1.575 m)   Wt 129 lb (58.5 kg)   SpO2 96%   BMI 23.59 kg/m²   Temp (24hrs), Av.4 °F (36.9 °C), Min:97.7 °F (36.5 °C), Max:99 °F (37.2 °C)      Physical Exam  Vitals and nursing note reviewed. Constitutional:       Appearance: She is well-developed. HENT:      Head: Normocephalic and atraumatic. Eyes:      General: No scleral icterus. Conjunctiva/sclera: Conjunctivae normal.      Pupils: Pupils are equal, round, and reactive to light. Neck:      Thyroid: No thyromegaly. Vascular: No hepatojugular reflux or JVD. Trachea: No tracheal deviation. Cardiovascular:      Rate and Rhythm: Normal rate and regular rhythm. Heart sounds: Normal heart sounds. Pulmonary:      Effort: Pulmonary effort is normal. No respiratory distress. Breath sounds: Normal breath sounds. No wheezing or rales. Comments: Has some rhonchi  Abdominal:      General: Bowel sounds are normal. There is no distension. Palpations: Abdomen is soft. There is no hepatomegaly or mass. Tenderness: There is no abdominal tenderness. There is no rebound. Hernia: No hernia is present. Genitourinary:     Comments: Stool was checked earlier and reported to be positive for occult blood  Musculoskeletal:         General: No tenderness. Cervical back: Normal range of motion and neck supple.       Comments: No joint swelling 96 Lymphadenopathy:      Cervical: No cervical adenopathy. Skin:     General: Skin is warm. Findings: No bruising, ecchymosis, erythema or rash. Neurological:      Mental Status: She is alert and oriented to person, place, and time. Cranial Nerves: No cranial nerve deficit. Psychiatric:         Thought Content:  Thought content normal.       Data:   CBC:   Lab Results   Component Value Date    WBC 12.0 06/13/2022    RBC 2.40 06/13/2022    HGB 7.5 06/13/2022    HCT 22.2 06/13/2022    MCV 92.4 06/13/2022    MCH 31.2 06/13/2022    MCHC 33.8 06/13/2022    RDW 17.0 06/13/2022     06/13/2022    MPV 6.7 06/13/2022     CBC with Differential:  Lab Results   Component Value Date    WBC 12.0 06/13/2022    RBC 2.40 06/13/2022    HGB 7.5 06/13/2022    HCT 22.2 06/13/2022     06/13/2022    MCV 92.4 06/13/2022    MCH 31.2 06/13/2022    MCHC 33.8 06/13/2022    RDW 17.0 06/13/2022    LYMPHOPCT 16 06/13/2022    MONOPCT 8 06/13/2022    BASOPCT 1 06/13/2022    MONOSABS 1.00 06/13/2022    LYMPHSABS 1.90 06/13/2022    EOSABS 0.30 06/13/2022    BASOSABS 0.10 06/13/2022     Hemoglobin/Hematocrit:  Lab Results   Component Value Date    HGB 7.5 06/13/2022    HCT 22.2 06/13/2022     CMP:    Lab Results   Component Value Date     06/13/2022    K 3.9 06/13/2022     06/13/2022    CO2 21 06/13/2022    BUN 23 06/13/2022    CREATININE 0.73 06/13/2022    GFRAA >60 06/13/2022    LABGLOM >60 06/13/2022    GLUCOSE 98 06/13/2022    PROT 6.1 06/12/2022    LABALBU 3.7 06/12/2022    CALCIUM 8.0 06/13/2022    BILITOT <0.15 06/12/2022    ALKPHOS 76 06/12/2022    AST 15 06/12/2022    ALT 21 06/12/2022     BMP:  Lab Results   Component Value Date     06/13/2022    K 3.9 06/13/2022     06/13/2022    CO2 21 06/13/2022    BUN 23 06/13/2022    LABALBU 3.7 06/12/2022    CREATININE 0.73 06/13/2022    CALCIUM 8.0 06/13/2022    GFRAA >60 06/13/2022    LABGLOM >60 06/13/2022    GLUCOSE 98 06/13/2022     PT/INR:    Lab Results   Component Value Date    PROTIME 14.2 06/12/2022    INR 1.1 06/12/2022     PTT:    Lab Results   Component Value Date    APTT 36.0 06/12/2022   [APTT}    Assesment:     Primary Problem  Anemia    Active Hospital Problems    Diagnosis Date Noted    Anemia [D64.9] 06/12/2022     Priority: Medium    Leukocytosis (leucocytosis) [D72.829] 06/12/2022     Priority: Medium    Hypothyroidism [E03.9] 06/12/2022     Priority: Medium    COPD (chronic obstructive pulmonary disease) ([facility] [address] [J44.9] 06/12/2022     Priority: Medium       Plan:     1. Patient has significant anemia. She was taking excessive NSAIDs. Has upper abdominal discomfort. 2. Basing on the history, symptoms upper GI issues such as ulcer disease, gastritis etc. Suspected. 3. Her hemoglobin was satisfactory few months ago  4. Looks like patient never had a colon examination in the past.  5. Basing on the upper GI exam we will decide whether she needs further evaluation of anemia including colonoscopy, small bowel examination etc.  6. Patient's son, they understood and verbalized consent for EGD. Thank you for allowing me to participate in the care of your patient. Please feel free to contact me with anyquestions or concerns. Electronically signed by Juliane Whitfield MD on 6/13/2022 at 1:07 PM     Copy sent to Dr. Tony Avila MD    Note is dictated utilizing voice recognition software. Unfortunately this leads to occasional typographical errors. Please contact our office if you have any questions.

## 2024-08-30 RX ORDER — AMLODIPINE BESYLATE 5 MG/1
5 TABLET ORAL EVERY MORNING
Qty: 90 TABLET | Refills: 3 | OUTPATIENT
Start: 2024-08-30

## 2024-09-03 RX ORDER — TIOTROPIUM BROMIDE INHALATION SPRAY 1.56 UG/1
SPRAY, METERED RESPIRATORY (INHALATION)
Qty: 12 G | Refills: 3 | OUTPATIENT
Start: 2024-09-03

## 2024-09-19 ENCOUNTER — OFFICE VISIT (OUTPATIENT)
Dept: INTERNAL MEDICINE CLINIC | Age: 79
End: 2024-09-19
Payer: MEDICARE

## 2024-09-19 ENCOUNTER — HOSPITAL ENCOUNTER (OUTPATIENT)
Age: 79
Setting detail: SPECIMEN
Discharge: HOME | End: 2024-09-19

## 2024-09-19 VITALS
WEIGHT: 136 LBS | BODY MASS INDEX: 25.03 KG/M2 | HEIGHT: 62 IN | SYSTOLIC BLOOD PRESSURE: 138 MMHG | DIASTOLIC BLOOD PRESSURE: 86 MMHG | OXYGEN SATURATION: 99 % | HEART RATE: 84 BPM

## 2024-09-19 DIAGNOSIS — J44.9 CHRONIC OBSTRUCTIVE PULMONARY DISEASE, UNSPECIFIED COPD TYPE (HCC): ICD-10-CM

## 2024-09-19 DIAGNOSIS — F41.1 GAD (GENERALIZED ANXIETY DISORDER): ICD-10-CM

## 2024-09-19 DIAGNOSIS — F41.1 GAD (GENERALIZED ANXIETY DISORDER): Primary | ICD-10-CM

## 2024-09-19 LAB — TSH SERPL DL<=0.05 MIU/L-ACNC: 3.1 UIU/ML (ref 0.27–4.2)

## 2024-09-19 PROCEDURE — 1123F ACP DISCUSS/DSCN MKR DOCD: CPT

## 2024-09-19 PROCEDURE — 99214 OFFICE O/P EST MOD 30 MIN: CPT

## 2024-09-19 RX ORDER — BUSPIRONE HYDROCHLORIDE 15 MG/1
15 TABLET ORAL 3 TIMES DAILY
Qty: 90 TABLET | Refills: 3 | Status: SHIPPED | OUTPATIENT
Start: 2024-09-19

## 2024-09-19 RX ORDER — HYDROXYZINE HYDROCHLORIDE 10 MG/1
10 TABLET, FILM COATED ORAL DAILY PRN
Qty: 10 TABLET | Refills: 0 | Status: SHIPPED | OUTPATIENT
Start: 2024-09-19 | End: 2024-09-29

## 2024-09-19 SDOH — ECONOMIC STABILITY: FOOD INSECURITY: WITHIN THE PAST 12 MONTHS, YOU WORRIED THAT YOUR FOOD WOULD RUN OUT BEFORE YOU GOT MONEY TO BUY MORE.: NEVER TRUE

## 2024-09-19 SDOH — ECONOMIC STABILITY: FOOD INSECURITY: WITHIN THE PAST 12 MONTHS, THE FOOD YOU BOUGHT JUST DIDN'T LAST AND YOU DIDN'T HAVE MONEY TO GET MORE.: NEVER TRUE

## 2024-09-19 SDOH — ECONOMIC STABILITY: INCOME INSECURITY: HOW HARD IS IT FOR YOU TO PAY FOR THE VERY BASICS LIKE FOOD, HOUSING, MEDICAL CARE, AND HEATING?: NOT HARD AT ALL

## 2024-09-19 ASSESSMENT — PATIENT HEALTH QUESTIONNAIRE - PHQ9
SUM OF ALL RESPONSES TO PHQ QUESTIONS 1-9: 5
6. FEELING BAD ABOUT YOURSELF - OR THAT YOU ARE A FAILURE OR HAVE LET YOURSELF OR YOUR FAMILY DOWN: NOT AT ALL
4. FEELING TIRED OR HAVING LITTLE ENERGY: NEARLY EVERY DAY
SUM OF ALL RESPONSES TO PHQ9 QUESTIONS 1 & 2: 1
5. POOR APPETITE OR OVEREATING: NOT AT ALL
SUM OF ALL RESPONSES TO PHQ QUESTIONS 1-9: 5
9. THOUGHTS THAT YOU WOULD BE BETTER OFF DEAD, OR OF HURTING YOURSELF: NOT AT ALL
2. FEELING DOWN, DEPRESSED OR HOPELESS: SEVERAL DAYS
10. IF YOU CHECKED OFF ANY PROBLEMS, HOW DIFFICULT HAVE THESE PROBLEMS MADE IT FOR YOU TO DO YOUR WORK, TAKE CARE OF THINGS AT HOME, OR GET ALONG WITH OTHER PEOPLE: NOT DIFFICULT AT ALL
8. MOVING OR SPEAKING SO SLOWLY THAT OTHER PEOPLE COULD HAVE NOTICED. OR THE OPPOSITE, BEING SO FIGETY OR RESTLESS THAT YOU HAVE BEEN MOVING AROUND A LOT MORE THAN USUAL: NOT AT ALL
3. TROUBLE FALLING OR STAYING ASLEEP: SEVERAL DAYS
SUM OF ALL RESPONSES TO PHQ QUESTIONS 1-9: 5
SUM OF ALL RESPONSES TO PHQ QUESTIONS 1-9: 5
7. TROUBLE CONCENTRATING ON THINGS, SUCH AS READING THE NEWSPAPER OR WATCHING TELEVISION: NOT AT ALL
1. LITTLE INTEREST OR PLEASURE IN DOING THINGS: NOT AT ALL

## 2024-09-19 ASSESSMENT — ENCOUNTER SYMPTOMS
WHEEZING: 0
ABDOMINAL PAIN: 0
SHORTNESS OF BREATH: 0
COUGH: 0
VOMITING: 0
CONSTIPATION: 0
ALLERGIC/IMMUNOLOGIC NEGATIVE: 1
DIARRHEA: 0
BACK PAIN: 0
NAUSEA: 0

## 2024-10-04 ENCOUNTER — OFFICE VISIT (OUTPATIENT)
Dept: INTERNAL MEDICINE CLINIC | Age: 79
End: 2024-10-04
Payer: MEDICARE

## 2024-10-04 VITALS
WEIGHT: 134 LBS | BODY MASS INDEX: 24.66 KG/M2 | OXYGEN SATURATION: 99 % | DIASTOLIC BLOOD PRESSURE: 82 MMHG | SYSTOLIC BLOOD PRESSURE: 138 MMHG | HEIGHT: 62 IN | HEART RATE: 95 BPM

## 2024-10-04 DIAGNOSIS — Z13.220 SCREENING FOR HYPERLIPIDEMIA: ICD-10-CM

## 2024-10-04 DIAGNOSIS — Z78.0 POST-MENOPAUSAL: Primary | ICD-10-CM

## 2024-10-04 DIAGNOSIS — F41.1 GAD (GENERALIZED ANXIETY DISORDER): ICD-10-CM

## 2024-10-04 DIAGNOSIS — J44.1 COPD EXACERBATION (HCC): ICD-10-CM

## 2024-10-04 DIAGNOSIS — Z76.89 ENCOUNTER TO ESTABLISH CARE: ICD-10-CM

## 2024-10-04 DIAGNOSIS — R05.1 ACUTE COUGH: ICD-10-CM

## 2024-10-04 DIAGNOSIS — J44.9 CHRONIC OBSTRUCTIVE PULMONARY DISEASE, UNSPECIFIED COPD TYPE (HCC): ICD-10-CM

## 2024-10-04 DIAGNOSIS — J06.9 UPPER RESPIRATORY TRACT INFECTION, UNSPECIFIED TYPE: ICD-10-CM

## 2024-10-04 DIAGNOSIS — Z13.1 DIABETES MELLITUS SCREENING: ICD-10-CM

## 2024-10-04 PROCEDURE — 99214 OFFICE O/P EST MOD 30 MIN: CPT

## 2024-10-04 PROCEDURE — 1123F ACP DISCUSS/DSCN MKR DOCD: CPT

## 2024-10-04 RX ORDER — HYDROXYZINE HYDROCHLORIDE 10 MG/1
10 TABLET, FILM COATED ORAL DAILY PRN
COMMUNITY
End: 2024-10-04 | Stop reason: SDUPTHER

## 2024-10-04 RX ORDER — HYDROXYZINE HYDROCHLORIDE 10 MG/1
10 TABLET, FILM COATED ORAL DAILY PRN
Qty: 30 TABLET | Refills: 3 | Status: SHIPPED | OUTPATIENT
Start: 2024-10-04

## 2024-10-04 RX ORDER — AZITHROMYCIN 250 MG/1
250 TABLET, FILM COATED ORAL
COMMUNITY

## 2024-10-04 RX ORDER — LANOLIN ALCOHOL/MO/W.PET/CERES
1000 CREAM (GRAM) TOPICAL DAILY
COMMUNITY

## 2024-10-04 RX ORDER — PREDNISONE 20 MG/1
40 TABLET ORAL DAILY
Qty: 10 TABLET | Refills: 0 | Status: SHIPPED | OUTPATIENT
Start: 2024-10-04 | End: 2024-10-09

## 2024-10-04 NOTE — PROGRESS NOTES
\"Have you been to the ER, urgent care clinic since your last visit?  Hospitalized since your last visit?\"    NO    “Have you seen or consulted any other health care providers outside our system since your last visit?”    NO          mi  
  09/12/23 138/80      ______________________________________________________  Diagnostic findings:  CBC:  Lab Results   Component Value Date/Time    WBC 9.0 06/28/2023 03:55 PM    HGB 15.2 06/28/2023 03:55 PM     06/28/2023 03:55 PM       BMP:    Lab Results   Component Value Date/Time     06/28/2023 05:20 PM    K 4.1 06/28/2023 05:20 PM    CL 98 06/28/2023 05:20 PM    CO2 25 06/28/2023 05:20 PM    BUN 17 06/28/2023 05:20 PM    CREATININE 0.68 06/28/2023 05:20 PM    GLUCOSE 109 06/28/2023 05:20 PM       HEMOGLOBIN A1C: No results found for: \"LABA1C\"    FASTING LIPID PANEL:No results found for: \"CHOL\", \"HDL\", \"TRIG\"  _____________________________________________________    Assessment & Plan     1. Post-menopausal  Last DEXA scan in [facility] showed osteopenia as per patient   Patient is not currently on vitamin D and calcium values.    Repeat DEXA scan, further management pending repeat DEXA scan  - DEXA Bone Density Axial Skeleton; Future    2. Screening for hyperlipidemia  - Lipid Panel; Future    3. Encounter to establish care    4. Diabetes mellitus screening  - Hemoglobin A1C; Future    5. YOHANNES (generalized anxiety disorder)  Controlled.  Prescribed Atarax last time, helping well with her anxiety symptoms.  - hydrOXYzine HCl (ATARAX) 10 MG tablet; Take 1 tablet by mouth daily as needed for Anxiety  Dispense: 30 tablet; Refill: 3    6. Acute cough  - Basic Metabolic Panel; Future  - Hepatic Function Panel; Future  - CBC with Auto Differential; Future    7. Upper respiratory tract infection, unspecified type  - Basic Metabolic Panel; Future  - Hepatic Function Panel; Future  - CBC with Auto Differential; Future    8. COPD exacerbation (HCC)  No sick contacts at home.  - Basic Metabolic Panel; Future  - Hepatic Function Panel; Future  - CBC with Auto Differential; Future  - predniSONE (DELTASONE) 20 MG tablet; Take 2 tablets by mouth daily for 5 days  Dispense: 10 tablet; Refill: 0  -

## 2024-10-18 ENCOUNTER — HOSPITAL ENCOUNTER (OUTPATIENT)
Age: 79
Setting detail: SPECIMEN
Discharge: HOME | End: 2024-10-18

## 2024-10-18 ENCOUNTER — HOSPITAL ENCOUNTER (OUTPATIENT)
Dept: GENERAL RADIOLOGY | Facility: CLINIC | Age: 79
Discharge: HOME | End: 2024-10-20
Payer: MEDICARE

## 2024-10-18 ENCOUNTER — HOSPITAL ENCOUNTER (OUTPATIENT)
Facility: CLINIC | Age: 79
Discharge: HOME | End: 2024-10-20
Payer: MEDICARE

## 2024-10-18 DIAGNOSIS — J44.1 COPD EXACERBATION (HCC): ICD-10-CM

## 2024-10-18 DIAGNOSIS — Z13.220 SCREENING FOR HYPERLIPIDEMIA: ICD-10-CM

## 2024-10-18 DIAGNOSIS — J06.9 UPPER RESPIRATORY TRACT INFECTION, UNSPECIFIED TYPE: ICD-10-CM

## 2024-10-18 DIAGNOSIS — D58.2 ELEVATED HEMOGLOBIN (HCC): ICD-10-CM

## 2024-10-18 DIAGNOSIS — R05.1 ACUTE COUGH: ICD-10-CM

## 2024-10-18 DIAGNOSIS — Z13.1 DIABETES MELLITUS SCREENING: ICD-10-CM

## 2024-10-18 DIAGNOSIS — D72.829 LEUKOCYTOSIS, UNSPECIFIED TYPE: Primary | ICD-10-CM

## 2024-10-18 LAB
ALBUMIN SERPL-MCNC: 5 G/DL (ref 3.5–5.2)
ALBUMIN/GLOB SERPL: 2 {RATIO} (ref 1–2.5)
ALP SERPL-CCNC: 92 U/L (ref 35–104)
ALT SERPL-CCNC: 20 U/L (ref 10–35)
ANION GAP SERPL CALCULATED.3IONS-SCNC: 13 MMOL/L (ref 9–16)
AST SERPL-CCNC: 26 U/L (ref 10–35)
BASOPHILS # BLD: 0.09 K/UL (ref 0–0.2)
BASOPHILS NFR BLD: 1 % (ref 0–2)
BILIRUB DIRECT SERPL-MCNC: 0.2 MG/DL (ref 0–0.2)
BILIRUB INDIRECT SERPL-MCNC: 0.4 MG/DL (ref 0–1)
BILIRUB SERPL-MCNC: 0.6 MG/DL (ref 0–1.2)
BUN SERPL-MCNC: 9 MG/DL (ref 8–23)
CALCIUM SERPL-MCNC: 10.4 MG/DL (ref 8.6–10.4)
CHLORIDE SERPL-SCNC: 100 MMOL/L (ref 98–107)
CHOLEST SERPL-MCNC: 222 MG/DL (ref 0–199)
CHOLESTEROL/HDL RATIO: 3
CO2 SERPL-SCNC: 26 MMOL/L (ref 20–31)
CREAT SERPL-MCNC: 0.8 MG/DL (ref 0.5–0.9)
EOSINOPHIL # BLD: 0.29 K/UL (ref 0–0.44)
EOSINOPHILS RELATIVE PERCENT: 2 % (ref 1–4)
ERYTHROCYTE [DISTWIDTH] IN BLOOD BY AUTOMATED COUNT: 13.2 % (ref 11.8–14.4)
EST. AVERAGE GLUCOSE BLD GHB EST-MCNC: 117 MG/DL
GFR, ESTIMATED: 72 ML/MIN/1.73M2
GLOBULIN SER CALC-MCNC: 2.8 G/DL
GLUCOSE SERPL-MCNC: 110 MG/DL (ref 74–99)
HBA1C MFR BLD: 5.7 % (ref 4–6)
HCT VFR BLD AUTO: 49 % (ref 36.3–47.1)
HDLC SERPL-MCNC: 84 MG/DL
HGB BLD-MCNC: 15.8 G/DL (ref 11.9–15.1)
IMM GRANULOCYTES # BLD AUTO: 0.07 K/UL (ref 0–0.3)
IMM GRANULOCYTES NFR BLD: 1 %
LDLC SERPL CALC-MCNC: 119 MG/DL (ref 0–100)
LYMPHOCYTES NFR BLD: 2.1 K/UL (ref 1.1–3.7)
LYMPHOCYTES RELATIVE PERCENT: 17 % (ref 24–43)
MCH RBC QN AUTO: 32 PG (ref 25.2–33.5)
MCHC RBC AUTO-ENTMCNC: 32.2 G/DL (ref 28.4–34.8)
MCV RBC AUTO: 99.2 FL (ref 82.6–102.9)
MONOCYTES NFR BLD: 0.84 K/UL (ref 0.1–1.2)
MONOCYTES NFR BLD: 7 % (ref 3–12)
NEUTROPHILS NFR BLD: 72 % (ref 36–65)
NEUTS SEG NFR BLD: 8.87 K/UL (ref 1.5–8.1)
NRBC BLD-RTO: 0 PER 100 WBC
PLATELET # BLD AUTO: 257 K/UL (ref 138–453)
PMV BLD AUTO: 11.2 FL (ref 8.1–13.5)
POTASSIUM SERPL-SCNC: 5.1 MMOL/L (ref 3.7–5.3)
PROT SERPL-MCNC: 7.8 G/DL (ref 6.6–8.7)
RBC # BLD AUTO: 4.94 M/UL (ref 3.95–5.11)
SODIUM SERPL-SCNC: 139 MMOL/L (ref 136–145)
TRIGL SERPL-MCNC: 93 MG/DL
VLDLC SERPL CALC-MCNC: 19 MG/DL
WBC OTHER # BLD: 12.3 K/UL (ref 3.5–11.3)

## 2024-10-18 PROCEDURE — 71046 X-RAY EXAM CHEST 2 VIEWS: CPT

## 2024-11-14 ENCOUNTER — HOSPITAL ENCOUNTER (OUTPATIENT)
Age: 79
Setting detail: SPECIMEN
Discharge: HOME | End: 2024-11-14

## 2024-11-14 DIAGNOSIS — D58.2 ELEVATED HEMOGLOBIN (HCC): ICD-10-CM

## 2024-11-14 DIAGNOSIS — D72.829 LEUKOCYTOSIS, UNSPECIFIED TYPE: ICD-10-CM

## 2024-11-14 LAB
ANION GAP SERPL CALCULATED.3IONS-SCNC: 13 MMOL/L (ref 9–16)
BASOPHILS # BLD: 0.08 K/UL (ref 0–0.2)
BASOPHILS NFR BLD: 1 % (ref 0–2)
BUN SERPL-MCNC: 18 MG/DL (ref 8–23)
CALCIUM SERPL-MCNC: 9.9 MG/DL (ref 8.6–10.4)
CHLORIDE SERPL-SCNC: 99 MMOL/L (ref 98–107)
CO2 SERPL-SCNC: 28 MMOL/L (ref 20–31)
CREAT SERPL-MCNC: 0.8 MG/DL (ref 0.6–0.9)
EOSINOPHIL # BLD: 0.25 K/UL (ref 0–0.44)
EOSINOPHILS RELATIVE PERCENT: 3 % (ref 1–4)
ERYTHROCYTE [DISTWIDTH] IN BLOOD BY AUTOMATED COUNT: 13.2 % (ref 11.8–14.4)
GFR, ESTIMATED: 74 ML/MIN/1.73M2
GLUCOSE SERPL-MCNC: 101 MG/DL (ref 74–99)
HCT VFR BLD AUTO: 47.1 % (ref 36.3–47.1)
HGB BLD-MCNC: 15.2 G/DL (ref 11.9–15.1)
IMM GRANULOCYTES # BLD AUTO: <0.03 K/UL (ref 0–0.3)
IMM GRANULOCYTES NFR BLD: 0 %
LYMPHOCYTES NFR BLD: 2.04 K/UL (ref 1.1–3.7)
LYMPHOCYTES RELATIVE PERCENT: 26 % (ref 24–43)
MCH RBC QN AUTO: 32.2 PG (ref 25.2–33.5)
MCHC RBC AUTO-ENTMCNC: 32.3 G/DL (ref 28.4–34.8)
MCV RBC AUTO: 99.8 FL (ref 82.6–102.9)
MONOCYTES NFR BLD: 0.58 K/UL (ref 0.1–1.2)
MONOCYTES NFR BLD: 7 % (ref 3–12)
NEUTROPHILS NFR BLD: 63 % (ref 36–65)
NEUTS SEG NFR BLD: 5.04 K/UL (ref 1.5–8.1)
NRBC BLD-RTO: 0 PER 100 WBC
PLATELET # BLD AUTO: 265 K/UL (ref 138–453)
PMV BLD AUTO: 11 FL (ref 8.1–13.5)
POTASSIUM SERPL-SCNC: 4.6 MMOL/L (ref 3.7–5.3)
RBC # BLD AUTO: 4.72 M/UL (ref 3.95–5.11)
SODIUM SERPL-SCNC: 140 MMOL/L (ref 136–145)
WBC OTHER # BLD: 8 K/UL (ref 3.5–11.3)

## 2024-11-15 DIAGNOSIS — J44.9 CHRONIC OBSTRUCTIVE PULMONARY DISEASE, UNSPECIFIED COPD TYPE (HCC): Primary | Chronic | ICD-10-CM

## 2024-11-15 RX ORDER — AZITHROMYCIN 500 MG/1
500 TABLET, FILM COATED ORAL
Qty: 90 TABLET | Refills: 1 | Status: SHIPPED | OUTPATIENT
Start: 2024-11-15

## 2024-11-15 NOTE — TELEPHONE ENCOUNTER
Patient was getting this medication from the [facility]. It is too much to get the patient there. Family states she takes it 3 times a week and it is a permanent medication for End stage COPD.    Please advise    [facility]

## 2024-12-23 RX ORDER — TIOTROPIUM BROMIDE INHALATION SPRAY 1.56 UG/1
2 SPRAY, METERED RESPIRATORY (INHALATION) DAILY
Qty: 12 G | Refills: 1 | Status: SHIPPED | OUTPATIENT
Start: 2024-12-23

## 2024-12-23 NOTE — TELEPHONE ENCOUNTER
Patient daughter calling to inform that Myrna is out of her inhaler ,,please send to local [facility]   Last office visit 10/4/2024

## 2025-01-03 RX ORDER — PANTOPRAZOLE SODIUM 40 MG/1
TABLET, DELAYED RELEASE ORAL
Qty: 180 TABLET | Refills: 1 | Status: SHIPPED | OUTPATIENT
Start: 2025-01-03

## 2025-01-03 RX ORDER — LEVOTHYROXINE SODIUM 75 UG/1
75 TABLET ORAL DAILY
Qty: 90 TABLET | Refills: 1 | Status: SHIPPED | OUTPATIENT
Start: 2025-01-03

## 2025-01-17 RX ORDER — ALBUTEROL SULFATE 90 UG/1
2 INHALANT RESPIRATORY (INHALATION) EVERY 4 HOURS PRN
Qty: 1 EACH | Refills: 5 | Status: SHIPPED | OUTPATIENT
Start: 2025-01-17 | End: 2025-07-16

## 2025-01-18 DIAGNOSIS — F41.1 GAD (GENERALIZED ANXIETY DISORDER): ICD-10-CM

## 2025-01-20 RX ORDER — BUSPIRONE HYDROCHLORIDE 15 MG/1
15 TABLET ORAL 2 TIMES DAILY
Qty: 180 TABLET | Refills: 1 | Status: SHIPPED | OUTPATIENT
Start: 2025-01-20 | End: 2025-01-22 | Stop reason: SDUPTHER

## 2025-01-22 DIAGNOSIS — F41.1 GAD (GENERALIZED ANXIETY DISORDER): ICD-10-CM

## 2025-01-22 RX ORDER — BUSPIRONE HYDROCHLORIDE 15 MG/1
15 TABLET ORAL 2 TIMES DAILY
Qty: 12 TABLET | Refills: 0 | Status: SHIPPED | OUTPATIENT
Start: 2025-01-22

## 2025-01-22 NOTE — TELEPHONE ENCOUNTER
LV 10/4/2024    Patient has requested a refill on Buspar but it will not be delivered until at the 27th.  Patient is completely  out and would like  a short fill sent to [facility]    Please advise

## 2025-01-26 DIAGNOSIS — F41.1 GAD (GENERALIZED ANXIETY DISORDER): ICD-10-CM

## 2025-01-27 DIAGNOSIS — F41.1 GAD (GENERALIZED ANXIETY DISORDER): ICD-10-CM

## 2025-01-27 RX ORDER — BUSPIRONE HYDROCHLORIDE 15 MG/1
15 TABLET ORAL 2 TIMES DAILY
Qty: 60 TABLET | Refills: 3 | Status: SHIPPED | OUTPATIENT
Start: 2025-01-27

## 2025-01-27 RX ORDER — HYDROXYZINE HYDROCHLORIDE 10 MG/1
TABLET, FILM COATED ORAL
Qty: 30 TABLET | Refills: 1 | Status: SHIPPED | OUTPATIENT
Start: 2025-01-27

## 2025-01-27 NOTE — TELEPHONE ENCOUNTER
Patient is no longer using the mail order service for this medication. Would like sent to the local.

## 2025-02-12 ENCOUNTER — APPOINTMENT (OUTPATIENT)
Dept: GENERAL RADIOLOGY | Age: 80
End: 2025-02-12
Payer: MEDICARE

## 2025-02-12 ENCOUNTER — HOSPITAL ENCOUNTER (INPATIENT)
Age: 80
LOS: 2 days | Discharge: HOME | End: 2025-02-14
Attending: EMERGENCY MEDICINE | Admitting: INTERNAL MEDICINE
Payer: MEDICARE

## 2025-02-12 ENCOUNTER — TELEPHONE (OUTPATIENT)
Dept: INTERNAL MEDICINE CLINIC | Age: 80
End: 2025-02-12

## 2025-02-12 DIAGNOSIS — F41.1 GAD (GENERALIZED ANXIETY DISORDER): ICD-10-CM

## 2025-02-12 DIAGNOSIS — J44.1 COPD EXACERBATION (HCC): Primary | ICD-10-CM

## 2025-02-12 LAB
ANION GAP SERPL CALCULATED.3IONS-SCNC: 14 MMOL/L (ref 9–16)
BASOPHILS # BLD: 0.1 K/UL (ref 0–0.2)
BASOPHILS NFR BLD: 1 % (ref 0–2)
BUN SERPL-MCNC: 15 MG/DL (ref 8–23)
CALCIUM SERPL-MCNC: 10.1 MG/DL (ref 8.6–10.4)
CHLORIDE SERPL-SCNC: 96 MMOL/L (ref 98–107)
CO2 SERPL-SCNC: 25 MMOL/L (ref 20–31)
CREAT SERPL-MCNC: 0.8 MG/DL (ref 0.7–1.2)
D DIMER PPP FEU-MCNC: 0.45 UG/ML FEU (ref 0–0.59)
EOSINOPHIL # BLD: 0.2 K/UL (ref 0–0.4)
EOSINOPHILS RELATIVE PERCENT: 2 % (ref 0–4)
ERYTHROCYTE [DISTWIDTH] IN BLOOD BY AUTOMATED COUNT: 13 % (ref 11.5–14.9)
FLUAV RNA RESP QL NAA+PROBE: NOT DETECTED
FLUBV RNA RESP QL NAA+PROBE: NOT DETECTED
GFR, ESTIMATED: 74 ML/MIN/1.73M2
GLUCOSE SERPL-MCNC: 111 MG/DL (ref 74–99)
HCT VFR BLD AUTO: 48 % (ref 36–46)
HGB BLD-MCNC: 15.8 G/DL (ref 12–16)
LYMPHOCYTES NFR BLD: 2.5 K/UL (ref 1–4.8)
LYMPHOCYTES RELATIVE PERCENT: 27 % (ref 24–44)
MCH RBC QN AUTO: 31.8 PG (ref 26–34)
MCHC RBC AUTO-ENTMCNC: 33 G/DL (ref 31–37)
MCV RBC AUTO: 96.3 FL (ref 80–100)
MONOCYTES NFR BLD: 0.7 K/UL (ref 0.1–1.3)
MONOCYTES NFR BLD: 7 % (ref 1–7)
NEUTROPHILS NFR BLD: 63 % (ref 36–66)
NEUTS SEG NFR BLD: 5.9 K/UL (ref 1.3–9.1)
PLATELET # BLD AUTO: 240 K/UL (ref 150–450)
PMV BLD AUTO: 8.3 FL (ref 6–12)
POTASSIUM SERPL-SCNC: 4.2 MMOL/L (ref 3.7–5.3)
RBC # BLD AUTO: 4.99 M/UL (ref 4–5.2)
SARS-COV-2 RNA RESP QL NAA+PROBE: NOT DETECTED
SODIUM SERPL-SCNC: 135 MMOL/L (ref 136–145)
SOURCE: NORMAL
SPECIMEN DESCRIPTION: NORMAL
TROPONIN I SERPL HS-MCNC: 12 NG/L (ref 0–14)
TROPONIN I SERPL HS-MCNC: 12 NG/L (ref 0–14)
WBC OTHER # BLD: 9.4 K/UL (ref 3.5–11)

## 2025-02-12 PROCEDURE — 2580000003 HC RX 258

## 2025-02-12 PROCEDURE — 2060000000 HC ICU INTERMEDIATE R&B

## 2025-02-12 PROCEDURE — 6360000002 HC RX W HCPCS

## 2025-02-12 PROCEDURE — 94761 N-INVAS EAR/PLS OXIMETRY MLT: CPT

## 2025-02-12 PROCEDURE — 93005 ELECTROCARDIOGRAM TRACING: CPT

## 2025-02-12 PROCEDURE — 94640 AIRWAY INHALATION TREATMENT: CPT

## 2025-02-12 PROCEDURE — 85025 COMPLETE CBC W/AUTO DIFF WBC: CPT

## 2025-02-12 PROCEDURE — 96374 THER/PROPH/DIAG INJ IV PUSH: CPT

## 2025-02-12 PROCEDURE — 87636 SARSCOV2 & INF A&B AMP PRB: CPT

## 2025-02-12 PROCEDURE — 2500000003 HC RX 250 WO HCPCS

## 2025-02-12 PROCEDURE — 6370000000 HC RX 637 (ALT 250 FOR IP)

## 2025-02-12 PROCEDURE — 85379 FIBRIN DEGRADATION QUANT: CPT

## 2025-02-12 PROCEDURE — 71046 X-RAY EXAM CHEST 2 VIEWS: CPT

## 2025-02-12 PROCEDURE — 99285 EMERGENCY DEPT VISIT HI MDM: CPT

## 2025-02-12 PROCEDURE — 80048 BASIC METABOLIC PNL TOTAL CA: CPT

## 2025-02-12 PROCEDURE — 84484 ASSAY OF TROPONIN QUANT: CPT

## 2025-02-12 PROCEDURE — 36415 COLL VENOUS BLD VENIPUNCTURE: CPT

## 2025-02-12 RX ORDER — HYDROXYZINE HYDROCHLORIDE 10 MG/1
10 TABLET, FILM COATED ORAL DAILY PRN
Status: DISCONTINUED | OUTPATIENT
Start: 2025-02-12 | End: 2025-02-13

## 2025-02-12 RX ORDER — SODIUM CHLORIDE 9 MG/ML
INJECTION, SOLUTION INTRAVENOUS CONTINUOUS
Status: DISCONTINUED | OUTPATIENT
Start: 2025-02-12 | End: 2025-02-14 | Stop reason: HOSPADM

## 2025-02-12 RX ORDER — ENOXAPARIN SODIUM 100 MG/ML
40 INJECTION SUBCUTANEOUS DAILY
Status: DISCONTINUED | OUTPATIENT
Start: 2025-02-12 | End: 2025-02-14 | Stop reason: HOSPADM

## 2025-02-12 RX ORDER — ALBUTEROL SULFATE 90 UG/1
2 INHALANT RESPIRATORY (INHALATION) EVERY 4 HOURS PRN
Status: DISCONTINUED | OUTPATIENT
Start: 2025-02-12 | End: 2025-02-14 | Stop reason: HOSPADM

## 2025-02-12 RX ORDER — SODIUM CHLORIDE 0.9 % (FLUSH) 0.9 %
5-40 SYRINGE (ML) INJECTION EVERY 12 HOURS SCHEDULED
Status: DISCONTINUED | OUTPATIENT
Start: 2025-02-12 | End: 2025-02-14 | Stop reason: HOSPADM

## 2025-02-12 RX ORDER — IPRATROPIUM BROMIDE AND ALBUTEROL SULFATE 2.5; .5 MG/3ML; MG/3ML
SOLUTION RESPIRATORY (INHALATION)
Status: DISPENSED
Start: 2025-02-12 | End: 2025-02-13

## 2025-02-12 RX ORDER — AMLODIPINE BESYLATE 5 MG/1
5 TABLET ORAL DAILY
Status: DISCONTINUED | OUTPATIENT
Start: 2025-02-12 | End: 2025-02-14 | Stop reason: HOSPADM

## 2025-02-12 RX ORDER — ONDANSETRON 4 MG/1
4 TABLET, ORALLY DISINTEGRATING ORAL EVERY 8 HOURS PRN
Status: DISCONTINUED | OUTPATIENT
Start: 2025-02-12 | End: 2025-02-14 | Stop reason: HOSPADM

## 2025-02-12 RX ORDER — ACETAMINOPHEN 650 MG/1
650 SUPPOSITORY RECTAL EVERY 6 HOURS PRN
Status: DISCONTINUED | OUTPATIENT
Start: 2025-02-12 | End: 2025-02-14 | Stop reason: HOSPADM

## 2025-02-12 RX ORDER — ONDANSETRON 2 MG/ML
4 INJECTION INTRAMUSCULAR; INTRAVENOUS EVERY 6 HOURS PRN
Status: DISCONTINUED | OUTPATIENT
Start: 2025-02-12 | End: 2025-02-14 | Stop reason: HOSPADM

## 2025-02-12 RX ORDER — SODIUM CHLORIDE 9 MG/ML
INJECTION, SOLUTION INTRAVENOUS PRN
Status: DISCONTINUED | OUTPATIENT
Start: 2025-02-12 | End: 2025-02-14 | Stop reason: HOSPADM

## 2025-02-12 RX ORDER — IPRATROPIUM BROMIDE AND ALBUTEROL SULFATE 2.5; .5 MG/3ML; MG/3ML
1 SOLUTION RESPIRATORY (INHALATION)
Status: DISCONTINUED | OUTPATIENT
Start: 2025-02-13 | End: 2025-02-14 | Stop reason: HOSPADM

## 2025-02-12 RX ORDER — ACETAMINOPHEN 325 MG/1
650 TABLET ORAL EVERY 6 HOURS PRN
Status: DISCONTINUED | OUTPATIENT
Start: 2025-02-12 | End: 2025-02-14 | Stop reason: HOSPADM

## 2025-02-12 RX ORDER — BUDESONIDE AND FORMOTEROL FUMARATE DIHYDRATE 160; 4.5 UG/1; UG/1
2 AEROSOL RESPIRATORY (INHALATION) 2 TIMES DAILY
Status: DISCONTINUED | OUTPATIENT
Start: 2025-02-12 | End: 2025-02-14 | Stop reason: HOSPADM

## 2025-02-12 RX ORDER — LEVOTHYROXINE SODIUM 75 UG/1
75 TABLET ORAL DAILY
Status: DISCONTINUED | OUTPATIENT
Start: 2025-02-12 | End: 2025-02-14 | Stop reason: HOSPADM

## 2025-02-12 RX ORDER — GUAIFENESIN 600 MG/1
1200 TABLET, EXTENDED RELEASE ORAL 2 TIMES DAILY
Status: DISCONTINUED | OUTPATIENT
Start: 2025-02-12 | End: 2025-02-14 | Stop reason: HOSPADM

## 2025-02-12 RX ORDER — SODIUM CHLORIDE 0.9 % (FLUSH) 0.9 %
5-40 SYRINGE (ML) INJECTION PRN
Status: DISCONTINUED | OUTPATIENT
Start: 2025-02-12 | End: 2025-02-14 | Stop reason: HOSPADM

## 2025-02-12 RX ORDER — BUSPIRONE HYDROCHLORIDE 15 MG/1
15 TABLET ORAL 2 TIMES DAILY
Status: DISCONTINUED | OUTPATIENT
Start: 2025-02-12 | End: 2025-02-14 | Stop reason: HOSPADM

## 2025-02-12 RX ORDER — LORAZEPAM 2 MG/ML
1 INJECTION INTRAMUSCULAR ONCE
Status: COMPLETED | OUTPATIENT
Start: 2025-02-12 | End: 2025-02-12

## 2025-02-12 RX ORDER — DEXAMETHASONE SODIUM PHOSPHATE 10 MG/ML
10 INJECTION, SOLUTION INTRAMUSCULAR; INTRAVENOUS ONCE
Status: COMPLETED | OUTPATIENT
Start: 2025-02-12 | End: 2025-02-12

## 2025-02-12 RX ORDER — ATORVASTATIN CALCIUM 20 MG/1
20 TABLET, FILM COATED ORAL DAILY
Status: DISCONTINUED | OUTPATIENT
Start: 2025-02-12 | End: 2025-02-14 | Stop reason: HOSPADM

## 2025-02-12 RX ORDER — POLYETHYLENE GLYCOL 3350 17 G/17G
17 POWDER, FOR SOLUTION ORAL DAILY PRN
Status: DISCONTINUED | OUTPATIENT
Start: 2025-02-12 | End: 2025-02-14 | Stop reason: HOSPADM

## 2025-02-12 RX ORDER — IPRATROPIUM BROMIDE AND ALBUTEROL SULFATE 2.5; .5 MG/3ML; MG/3ML
1 SOLUTION RESPIRATORY (INHALATION) EVERY 4 HOURS PRN
Status: DISCONTINUED | OUTPATIENT
Start: 2025-02-12 | End: 2025-02-14 | Stop reason: HOSPADM

## 2025-02-12 RX ADMIN — WATER 1000 MG: 1 INJECTION INTRAMUSCULAR; INTRAVENOUS; SUBCUTANEOUS at 18:56

## 2025-02-12 RX ADMIN — BUSPIRONE HYDROCHLORIDE 15 MG: 15 TABLET ORAL at 22:00

## 2025-02-12 RX ADMIN — Medication 3 MG: at 23:36

## 2025-02-12 RX ADMIN — ENOXAPARIN SODIUM 40 MG: 100 INJECTION SUBCUTANEOUS at 22:00

## 2025-02-12 RX ADMIN — BUDESONIDE AND FORMOTEROL FUMARATE DIHYDRATE 2 PUFF: 160; 4.5 AEROSOL RESPIRATORY (INHALATION) at 21:10

## 2025-02-12 RX ADMIN — SODIUM CHLORIDE: 9 INJECTION, SOLUTION INTRAVENOUS at 21:59

## 2025-02-12 RX ADMIN — GUAIFENESIN 1200 MG: 600 TABLET ORAL at 22:00

## 2025-02-12 RX ADMIN — DEXAMETHASONE SODIUM PHOSPHATE 10 MG: 10 INJECTION INTRAMUSCULAR; INTRAVENOUS at 17:07

## 2025-02-12 RX ADMIN — ALBUTEROL SULFATE 2 PUFF: 90 AEROSOL, METERED RESPIRATORY (INHALATION) at 21:13

## 2025-02-12 RX ADMIN — AZITHROMYCIN MONOHYDRATE 500 MG: 500 INJECTION, POWDER, LYOPHILIZED, FOR SOLUTION INTRAVENOUS at 19:04

## 2025-02-12 RX ADMIN — SODIUM CHLORIDE, PRESERVATIVE FREE 10 ML: 5 INJECTION INTRAVENOUS at 22:01

## 2025-02-12 RX ADMIN — LORAZEPAM 1 MG: 2 INJECTION INTRAMUSCULAR; INTRAVENOUS at 19:32

## 2025-02-12 ASSESSMENT — LIFESTYLE VARIABLES
HOW OFTEN DO YOU HAVE A DRINK CONTAINING ALCOHOL: NEVER
HOW MANY STANDARD DRINKS CONTAINING ALCOHOL DO YOU HAVE ON A TYPICAL DAY: PATIENT DOES NOT DRINK

## 2025-02-12 ASSESSMENT — PAIN - FUNCTIONAL ASSESSMENT: PAIN_FUNCTIONAL_ASSESSMENT: NONE - DENIES PAIN

## 2025-02-12 NOTE — PROGRESS NOTES
[facility] Internal Medicine  Gil Allen MD; Jorge Minor MD; Layo Gaviria MD; MD Mere Walters MD; Dominique Danielson MD  [facility] Internal Medicine   IN-PATIENT SERVICE  [facility]                 Date:   2/12/2025  Patientname:  Myrna Smiley  Date of admission:  2/12/2025  4:48 PM  MRN:   [id number]  Account:  [id number]  YOB: 1945  PCP:    Elizabeth Rick MD  Room:   15/15  Code Status:    Full    Chief Complaint:     Chief Complaint   Patient presents with    Shortness of Breath     Pt has concentrator at home that has not been working. Pt has been sob. Pt states worse in last week., pt c/o cough as well. Pt also has anxiety. Ems gave duoneb. Pt arrives on 5L O2. Pt has copd       History of Present Illness:     Myrna Smiley is a 79 y.o. Non- / non  female with history of COPD, hyperlipidemia, hypertension, hypothyroidism, and thyroid disease who presents with Shortness of Breath (Pt has concentrator at home that has not been working. Pt has been sob. Pt states worse in last week., pt c/o cough as well. Pt also has anxiety. Ems gave duoneb. Pt arrives on 5L O2. Pt has copd)   and is admitted to the hospital for the management of COPD exacerbation and home nebulizer management.    According to patient, she has COPD on home oxygen.  She states that her home oxygen concentrator has only been working intermittently.  The patient's daughter tried to hook her up to her backup nebulizer, but that was not working either.  As a result, she has been experiencing increased shortness of breath.  She also endorses an increased level of a productive cough.  However, sputum production has remained clear.  Per EMS, the patient was and a tripod position with SpO2 in the mid 80s.  Nasal cannula and albuterol gave the patient some relief.    Upon presentation to the ER the patient was on 6 L of oxygen.  She was both tachycardic and  Collection Time: 02/12/25  5:04 PM   Result Value Ref Range    D-Dimer, Quant 0.45 0.00 - 0.59 ug/mL FEU   EKG 12 Lead    Collection Time: 02/12/25  5:15 PM   Result Value Ref Range    Ventricular Rate 91 BPM    Atrial Rate 91 BPM    P-R Interval 172 ms    QRS Duration 70 ms    Q-T Interval 364 ms    QTc Calculation (Bazett) 447 ms    P Axis 68 degrees    R Axis -11 degrees    T Axis 54 degrees   COVID-19 & Influenza Combo    Collection Time: 02/12/25  5:34 PM    Specimen: Nasopharyngeal Swab   Result Value Ref Range    Specimen Description .NASOPHARYNGEAL SWAB     Source .NASOPHARYNGEAL SWAB     SARS-CoV-2 RNA, RT PCR Not Detected Not Detected    Influenza A Not Detected Not Detected    Influenza B Not Detected Not Detected       Imaging/Diagnostics:  XR CHEST (2 VW)    Result Date: 2/12/2025  Stable chronic changes with no acute abnormality seen.         Plan:     Patient status inpatient in the Progressive Unit/Step down          Dorinda Bowen, KAM - NP  2/12/2025  6:41 PM      Please note that this chart was generated using voice recognition Dragon dictation software.  Although every effort was made to ensure the accuracy of this automated transcription, some errors in transcription may have occurred.    [facility]  [address] [place] [address].   Phone [phone]

## 2025-02-12 NOTE — ED PROVIDER NOTES
[facility] EMERGENCY DEPARTMENT  Emergency Department Encounter  Emergency Medicine Resident     Pt Name:Myrna Smiley  MRN: [id number]  Birthdate 1945  Date of evaluation: 2/12/25  PCP:  Elizabeth Rick MD  Note Started: 5:02 PM EST      CHIEF COMPLAINT       Chief Complaint   Patient presents with    Shortness of Breath     Pt has concentrator at home that has not been working. Pt has been sob. Pt states worse in last week., pt c/o cough as well. Pt also has anxiety. Ems gave duoneb. Pt arrives on 5L O2. Pt has copd       HISTORY OF PRESENT ILLNESS  (Location/Symptom, Timing/Onset, Context/Setting, Quality, Duration, Modifying Factors, Severity.)      Myrna Smiley is a 79 y.o. female with history of COPD on home oxygen (unknown settings) presents with shortness of breath.  Patient states she has been short of breath for the last week, progressively worsening to the point where she was having difficulty speaking and requested EMS.  She has been having difficulties with her home oxygen concentrator and states it has been intermittently working for the last week.  She does have a productive cough, more than usual with clear-colored sputum.  Per EMS she was in the tripod position upon initial evaluation, some wheezing in the lung fields.  SpO2 is reportedly in the mid 80s.  She was started on nasal cannula and albuterol as administered with some relief of her symptoms.  She denies associated chest pain, diaphoresis or palpitations.  She states she is currently out of oxygen and  does not have access to a  working concentrator or oxygen cylinders.  She denies prior PE/DVT or calf tenderness or swelling.  No recent sick contacts.  Denies fever, chills, myalgias or night sweats.    PAST MEDICAL / SURGICAL / SOCIAL / FAMILY HISTORY      has a past medical history of COPD (chronic obstructive pulmonary disease) (HCC), Hyperlipidemia, Hypertension, Hypothyroidism, Kidney stone, and Thyroid disease.          Problem Relation Age of Onset    No Known Problems Mother     No Known Problems Father        Allergies:  Ibuprofen    Home Medications:  Prior to Admission medications    Medication Sig Start Date End Date Taking? Authorizing Provider   hydrOXYzine HCl (ATARAX) 10 MG tablet TAKE 1 TABLET EVERY DAY AS NEEDED FOR ANXIETY 1/27/25   Elizabeth Rick MD   busPIRone (BUSPAR) 15 MG tablet Take 15 mg by mouth 2 times daily 1/27/25   Elizabeth Rick MD   albuterol sulfate HFA (PROVENTIL;VENTOLIN;PROAIR) 108 (90 Base) MCG/ACT inhaler INHALE 2 PUFFS INTO THE LUNGS EVERY 4 HOURS AS NEEDED FOR WHEEZING 1/17/25 7/16/25  Elizabeth Rick MD   pantoprazole (PROTONIX) 40 MG tablet TAKE 1 TABLET BY MOUTH EVERY MORNING AND 1 TABLET EVERY EVENING 1/3/25   Elizabeth Rick MD   levothyroxine (SYNTHROID) 75 MCG tablet TAKE 1 TABLET EVERY DAY 1/3/25   Elizabeth Rick MD   tiotropium (SPIRIVA RESPIMAT) 1.25 MCG/ACT AERS inhaler Inhale 2 puffs into the lungs daily 12/23/24   Elizabeth Rick MD   azithromycin (ZITHROMAX) 500 MG tablet Take 1 tablet by mouth three times a week Take 1 tab 3 times weekly, [facility] 11/15/24   Elizabeth Rick MD   Omega 3-6-9 Fatty Acids (TRIPLE OMEGA COMPLEX PO) Take by mouth    ProviderManuel MD   vitamin B-12 (CYANOCOBALAMIN) 1000 MCG tablet Take 1 tablet by mouth daily    ProviderManuel MD   albuterol (PROVENTIL) (2.5 MG/3ML) 0.083% nebulizer solution INHALE THE CONTENTS OF 1 VIAL VIA NEBULIZER EVERY 6 HOURS AS NEEDED FOR WHEEZING 6/20/24 12/17/24  Jorge Minor MD   atorvastatin (LIPITOR) 20 MG tablet TAKE 1 TABLET BY MOUTH IN THE MORNING. 6/12/24   Jorge Minor MD   budesonide-formoterol (SYMBICORT) 160-4.5 MCG/ACT AERO Inhale 2 puffs into the lungs 2 times daily 4/15/24   Jorge Minor MD   amLODIPine (NORVASC) 5 MG tablet TAKE 1 TABLET BY MOUTH IN THE MORNING. 6/27/23   Jorge Minor MD   guaiFENesin (MUCINEX) 600 MG extended release

## 2025-02-12 NOTE — ED PROVIDER NOTES
EMERGENCY DEPARTMENT ENCOUNTER   ATTENDING ATTESTATION     Pt Name: Myrna Smiley  MRN: [id number]  Birthdate 1945  Date of evaluation: 2/12/25       Myrna Smiley is a 79 y.o. female who presents with Shortness of Breath (Pt has concentrator at home that has not been working. Pt has been sob. Pt states worse in last week., pt c/o cough as well. Pt also has anxiety. Ems gave duoneb. Pt arrives on 5L O2. Pt has copd)      MDM:   Worsening shortness of breath.  Has 2 concentrators at home for oxygen.  Both of them had air messages today.  They were not functioning.  Family brought her here they do not have any other source of oxygen.  She is on chronic oxygen for COPD.  Family is trying to get delivery of new concentrators but it can to take 24 to 48 hours per them.  They have no backup oxygen tank.    Admitting to the hospital for COPD exacerbation.  Case management is can have to help get her home oxygen set up again.    Vitals:   Vitals:    02/12/25 1651 02/12/25 1653   BP: (!) 160/110 (!) 163/78   Pulse: (!) 103 97   Resp: 29 26   Temp:  98 °F (36.7 °C)   TempSrc:  Oral   SpO2: 99% 98%   Weight: 59 kg (130 lb)    Height: 1.626 m (5' 4\")          I personally saw and examined the patient. I have reviewed and agree with the resident's findings, including all diagnostic interpretations and treatment plan as written. I was present for the key portions of any procedures performed and the inclusive time noted for any critical care statement.    Tima Ferrer MD  Attending Emergency Physician            Tima Ferrer MD  02/12/25 [id number]

## 2025-02-12 NOTE — TELEPHONE ENCOUNTER
Patients daughter is calling with concerns to patients oxygen. Patients current oxygen canister is reading an error code. [employer] will get a a new machine delivered in the next 24-48 hours. Patients daughter needed to know how to proceed. Advised she take patient to the ED if there is no oxygen at home. She verbalized understanding and agreed to take to the ED.

## 2025-02-13 ENCOUNTER — APPOINTMENT (OUTPATIENT)
Dept: GENERAL RADIOLOGY | Age: 80
End: 2025-02-13
Payer: MEDICARE

## 2025-02-13 LAB
ANION GAP SERPL CALCULATED.3IONS-SCNC: 12 MMOL/L (ref 9–16)
B PARAP IS1001 DNA NPH QL NAA+NON-PROBE: NOT DETECTED
B PERT DNA SPEC QL NAA+PROBE: NOT DETECTED
BASOPHILS # BLD: 0 K/UL (ref 0–0.2)
BASOPHILS NFR BLD: 0 % (ref 0–2)
BUN SERPL-MCNC: 15 MG/DL (ref 8–23)
C PNEUM DNA NPH QL NAA+NON-PROBE: NOT DETECTED
CALCIUM SERPL-MCNC: 9 MG/DL (ref 8.6–10.4)
CHLORIDE SERPL-SCNC: 101 MMOL/L (ref 98–107)
CO2 SERPL-SCNC: 22 MMOL/L (ref 20–31)
CREAT SERPL-MCNC: 0.7 MG/DL (ref 0.7–1.2)
EKG ATRIAL RATE: 91 BPM
EKG P AXIS: 68 DEGREES
EKG P-R INTERVAL: 172 MS
EKG Q-T INTERVAL: 364 MS
EKG QRS DURATION: 70 MS
EKG QTC CALCULATION (BAZETT): 447 MS
EKG R AXIS: -11 DEGREES
EKG T AXIS: 54 DEGREES
EKG VENTRICULAR RATE: 91 BPM
EOSINOPHIL # BLD: 0 K/UL (ref 0–0.4)
EOSINOPHILS RELATIVE PERCENT: 0 % (ref 0–4)
ERYTHROCYTE [DISTWIDTH] IN BLOOD BY AUTOMATED COUNT: 12.9 % (ref 11.5–14.9)
FLUAV RNA NPH QL NAA+NON-PROBE: NOT DETECTED
FLUBV RNA NPH QL NAA+NON-PROBE: NOT DETECTED
GFR, ESTIMATED: 87 ML/MIN/1.73M2
GLUCOSE SERPL-MCNC: 151 MG/DL (ref 74–99)
HADV DNA NPH QL NAA+NON-PROBE: NOT DETECTED
HCOV 229E RNA NPH QL NAA+NON-PROBE: NOT DETECTED
HCOV HKU1 RNA NPH QL NAA+NON-PROBE: NOT DETECTED
HCOV NL63 RNA NPH QL NAA+NON-PROBE: NOT DETECTED
HCOV OC43 RNA NPH QL NAA+NON-PROBE: NOT DETECTED
HCT VFR BLD AUTO: 42.2 % (ref 36–46)
HGB BLD-MCNC: 13.8 G/DL (ref 12–16)
HMPV RNA NPH QL NAA+NON-PROBE: NOT DETECTED
HPIV1 RNA NPH QL NAA+NON-PROBE: NOT DETECTED
HPIV2 RNA NPH QL NAA+NON-PROBE: NOT DETECTED
HPIV3 RNA NPH QL NAA+NON-PROBE: NOT DETECTED
HPIV4 RNA NPH QL NAA+NON-PROBE: NOT DETECTED
LYMPHOCYTES NFR BLD: 0.8 K/UL (ref 1–4.8)
LYMPHOCYTES RELATIVE PERCENT: 15 % (ref 24–44)
M PNEUMO DNA NPH QL NAA+NON-PROBE: NOT DETECTED
MCH RBC QN AUTO: 31.9 PG (ref 26–34)
MCHC RBC AUTO-ENTMCNC: 32.7 G/DL (ref 31–37)
MCV RBC AUTO: 97.7 FL (ref 80–100)
MONOCYTES NFR BLD: 0.1 K/UL (ref 0.1–1.3)
MONOCYTES NFR BLD: 2 % (ref 1–7)
NEUTROPHILS NFR BLD: 83 % (ref 36–66)
NEUTS SEG NFR BLD: 4.4 K/UL (ref 1.3–9.1)
PLATELET # BLD AUTO: 207 K/UL (ref 150–450)
PMV BLD AUTO: 8.7 FL (ref 6–12)
POTASSIUM SERPL-SCNC: 4.6 MMOL/L (ref 3.7–5.3)
RBC # BLD AUTO: 4.32 M/UL (ref 4–5.2)
RSV RNA NPH QL NAA+NON-PROBE: NOT DETECTED
RV+EV RNA NPH QL NAA+NON-PROBE: NOT DETECTED
SARS-COV-2 RNA NPH QL NAA+NON-PROBE: NOT DETECTED
SODIUM SERPL-SCNC: 135 MMOL/L (ref 136–145)
SPECIMEN DESCRIPTION: NORMAL
WBC OTHER # BLD: 5.3 K/UL (ref 3.5–11)

## 2025-02-13 PROCEDURE — 97116 GAIT TRAINING THERAPY: CPT

## 2025-02-13 PROCEDURE — 2700000000 HC OXYGEN THERAPY PER DAY

## 2025-02-13 PROCEDURE — 2500000003 HC RX 250 WO HCPCS: Performed by: NURSE PRACTITIONER

## 2025-02-13 PROCEDURE — 6370000000 HC RX 637 (ALT 250 FOR IP)

## 2025-02-13 PROCEDURE — 80048 BASIC METABOLIC PNL TOTAL CA: CPT

## 2025-02-13 PROCEDURE — 97166 OT EVAL MOD COMPLEX 45 MIN: CPT

## 2025-02-13 PROCEDURE — 6370000000 HC RX 637 (ALT 250 FOR IP): Performed by: INTERNAL MEDICINE

## 2025-02-13 PROCEDURE — 6360000002 HC RX W HCPCS: Performed by: NURSE PRACTITIONER

## 2025-02-13 PROCEDURE — 97162 PT EVAL MOD COMPLEX 30 MIN: CPT

## 2025-02-13 PROCEDURE — 2060000000 HC ICU INTERMEDIATE R&B

## 2025-02-13 PROCEDURE — 94761 N-INVAS EAR/PLS OXIMETRY MLT: CPT

## 2025-02-13 PROCEDURE — 99223 1ST HOSP IP/OBS HIGH 75: CPT | Performed by: INTERNAL MEDICINE

## 2025-02-13 PROCEDURE — 2580000003 HC RX 258: Performed by: INTERNAL MEDICINE

## 2025-02-13 PROCEDURE — 71045 X-RAY EXAM CHEST 1 VIEW: CPT

## 2025-02-13 PROCEDURE — 6360000002 HC RX W HCPCS: Performed by: INTERNAL MEDICINE

## 2025-02-13 PROCEDURE — 94640 AIRWAY INHALATION TREATMENT: CPT

## 2025-02-13 PROCEDURE — 93010 ELECTROCARDIOGRAM REPORT: CPT | Performed by: INTERNAL MEDICINE

## 2025-02-13 PROCEDURE — 0202U NFCT DS 22 TRGT SARS-COV-2: CPT

## 2025-02-13 PROCEDURE — 36415 COLL VENOUS BLD VENIPUNCTURE: CPT

## 2025-02-13 PROCEDURE — 6360000002 HC RX W HCPCS

## 2025-02-13 PROCEDURE — 97530 THERAPEUTIC ACTIVITIES: CPT

## 2025-02-13 PROCEDURE — 85025 COMPLETE CBC W/AUTO DIFF WBC: CPT

## 2025-02-13 RX ORDER — HYDROXYZINE HYDROCHLORIDE 10 MG/1
10 TABLET, FILM COATED ORAL 3 TIMES DAILY PRN
Status: DISCONTINUED | OUTPATIENT
Start: 2025-02-13 | End: 2025-02-14 | Stop reason: HOSPADM

## 2025-02-13 RX ADMIN — IPRATROPIUM BROMIDE AND ALBUTEROL SULFATE 1 DOSE: .5; 2.5 SOLUTION RESPIRATORY (INHALATION) at 20:36

## 2025-02-13 RX ADMIN — ACETAMINOPHEN 650 MG: 325 TABLET ORAL at 17:14

## 2025-02-13 RX ADMIN — HYDROXYZINE HYDROCHLORIDE 10 MG: 10 TABLET, FILM COATED ORAL at 20:54

## 2025-02-13 RX ADMIN — BENZOCAINE 6 MG-MENTHOL 10 MG LOZENGES 1 LOZENGE: at 20:55

## 2025-02-13 RX ADMIN — BUDESONIDE AND FORMOTEROL FUMARATE DIHYDRATE 2 PUFF: 160; 4.5 AEROSOL RESPIRATORY (INHALATION) at 20:35

## 2025-02-13 RX ADMIN — AZITHROMYCIN MONOHYDRATE 500 MG: 500 INJECTION, POWDER, LYOPHILIZED, FOR SOLUTION INTRAVENOUS at 10:07

## 2025-02-13 RX ADMIN — GUAIFENESIN 1200 MG: 600 TABLET ORAL at 20:55

## 2025-02-13 RX ADMIN — AMLODIPINE BESYLATE 5 MG: 5 TABLET ORAL at 08:26

## 2025-02-13 RX ADMIN — ATORVASTATIN CALCIUM 20 MG: 20 TABLET, FILM COATED ORAL at 08:26

## 2025-02-13 RX ADMIN — IPRATROPIUM BROMIDE AND ALBUTEROL SULFATE 1 DOSE: .5; 2.5 SOLUTION RESPIRATORY (INHALATION) at 11:46

## 2025-02-13 RX ADMIN — HYDROXYZINE HYDROCHLORIDE 10 MG: 10 TABLET, FILM COATED ORAL at 08:30

## 2025-02-13 RX ADMIN — GUAIFENESIN 1200 MG: 600 TABLET ORAL at 08:26

## 2025-02-13 RX ADMIN — HYDROXYZINE HYDROCHLORIDE 10 MG: 10 TABLET, FILM COATED ORAL at 13:42

## 2025-02-13 RX ADMIN — IPRATROPIUM BROMIDE AND ALBUTEROL SULFATE 1 DOSE: .5; 2.5 SOLUTION RESPIRATORY (INHALATION) at 08:22

## 2025-02-13 RX ADMIN — LEVOTHYROXINE SODIUM 75 MCG: 0.07 TABLET ORAL at 06:33

## 2025-02-13 RX ADMIN — IPRATROPIUM BROMIDE AND ALBUTEROL SULFATE 1 DOSE: .5; 2.5 SOLUTION RESPIRATORY (INHALATION) at 16:19

## 2025-02-13 RX ADMIN — Medication 3 MG: at 20:54

## 2025-02-13 RX ADMIN — BUSPIRONE HYDROCHLORIDE 15 MG: 15 TABLET ORAL at 20:55

## 2025-02-13 RX ADMIN — WATER 40 MG: 1 INJECTION INTRAMUSCULAR; INTRAVENOUS; SUBCUTANEOUS at 15:13

## 2025-02-13 RX ADMIN — SODIUM CHLORIDE: 9 INJECTION, SOLUTION INTRAVENOUS at 13:42

## 2025-02-13 RX ADMIN — WATER 40 MG: 1 INJECTION INTRAMUSCULAR; INTRAVENOUS; SUBCUTANEOUS at 22:11

## 2025-02-13 RX ADMIN — BUDESONIDE AND FORMOTEROL FUMARATE DIHYDRATE 2 PUFF: 160; 4.5 AEROSOL RESPIRATORY (INHALATION) at 08:23

## 2025-02-13 RX ADMIN — ENOXAPARIN SODIUM 40 MG: 100 INJECTION SUBCUTANEOUS at 08:25

## 2025-02-13 RX ADMIN — BUSPIRONE HYDROCHLORIDE 15 MG: 15 TABLET ORAL at 08:26

## 2025-02-13 ASSESSMENT — PAIN - FUNCTIONAL ASSESSMENT: PAIN_FUNCTIONAL_ASSESSMENT: ACTIVITIES ARE NOT PREVENTED

## 2025-02-13 ASSESSMENT — PAIN DESCRIPTION - LOCATION: LOCATION: THROAT

## 2025-02-13 ASSESSMENT — PAIN DESCRIPTION - DESCRIPTORS: DESCRIPTORS: ACHING

## 2025-02-13 ASSESSMENT — PAIN SCALES - GENERAL
PAINLEVEL_OUTOF10: 2
PAINLEVEL_OUTOF10: 3

## 2025-02-13 NOTE — PROGRESS NOTES
Physical Therapy  [facility]   Physical Therapy Evaluation  Date: 25  Patient Name: Myrna Smiley       Room: -01  MRN: [id number]  Account: [id number]   : 1945  (79 y.o.) Gender: female     Discharge Recommendations:  Discharge Recommendations: Continue to assess pending progress, Patient would benefit from continued therapy after discharge     PT Equipment Recommendations  Equipment Needed:  (TBD, ? wheeled walker)     Past Medical History:  has a past medical history of COPD (chronic obstructive pulmonary disease) (HCC), Hyperlipidemia, Hypertension, Hypothyroidism, Kidney stone, and Thyroid disease.  Past Surgical History:   has a past surgical history that includes Upper gastrointestinal endoscopy (N/A, 2022) and Upper gastrointestinal endoscopy (N/A, 2022).    Subjective  Subjective  Subjective: Pt reports increased difficulty breathing for past week.     General  Patient assessed for rehabilitation services?: Yes  Additional Pertinent Hx: Per ER notes: Myrna Smiley is a 79 y.o. female with history of COPD on home oxygen (unknown settings) presents with shortness of breath.  Patient states she has been short of breath for the last week, progressively worsening to the point where she was having difficulty speaking and requested EMS.  She has been having difficulties with her home oxygen concentrator and states it has been intermittently working for the last week.  She does have a productive cough, more than usual with clear-colored sputum.  Per EMS she was in the tripod position upon initial evaluation, some wheezing in the lung fields.  SpO2 is reportedly in the mid 80s.  She was started on nasal cannula and albuterol as administered with some relief of her symptoms.  She denies associated chest pain, diaphoresis or palpitations.  She states she is currently out of oxygen and  does not have access to a  working concentrator or oxygen cylinders.  She denies

## 2025-02-13 NOTE — PLAN OF CARE
Problem: Discharge Planning  Goal: Discharge to home or other facility with appropriate resources  2/13/2025 1632 by Lindsey Leblanc, RN  Outcome: Progressing     Problem: Safety - Adult  Goal: Free from fall injury  2/13/2025 1632 by Lindsey Leblanc, RN  Outcome: Progressing     Problem: ABCDS Injury Assessment  Goal: Absence of physical injury  Outcome: Progressing

## 2025-02-13 NOTE — PROGRESS NOTES
[facility]   Occupational Therapy Evaluation  Date: 25  Patient Name: Myrna Smiley       Room:   MRN: [id number]  Account: [id number]   : 1945  (79 y.o.) Gender: female     Discharge Recommendations:  Further Occupational Therapy is recommended upon facility discharge.    OT Equipment Recommendations  Other: CTA    Referring Practitioner: Dorinda Bowen APRN - NP  Diagnosis: COPD exacerbation (HCC)      Treatment Diagnosis: impaired self-care status    Past Medical History:  has a past medical history of COPD (chronic obstructive pulmonary disease) (HCC), Hyperlipidemia, Hypertension, Hypothyroidism, Kidney stone, and Thyroid disease.    Past Surgical History:   has a past surgical history that includes Upper gastrointestinal endoscopy (N/A, 2022) and Upper gastrointestinal endoscopy (N/A, 2022).    Restrictions  Restrictions/Precautions  Restrictions/Precautions: Fall Risk (O2 at 3 L, IV left antecubital)  Activity Level: Up as Tolerated  Required Braces or Orthoses?: No  Implants Present? :  (denies)      Vitals  Vitals  O2 Device: Nasal cannula (3 L)     Subjective  Subjective: \"I don't ever like to use the stairs.\" Pt very pleasant and agreeable to OT/PT evaluation this date. Pt is on O2 at baseline at home and on 3L O2 this date. Pt experiences SOB at baseline and was experiencing it this date. Pt expressed that she is very fearful of functional mobility due to limited balance and has fallen recently due to this.  Comments: Ok per RN Ronel P. to see pt for OT/PT evaluation.  Pain  Pre-Pain: 5  Post-Pain: 5  Pain Interventions: Repositioning    Social/Functional History  Social/Functional History  Lives With: Family (dtr and son-in-law)  Type of Home: House  Home Layout: Two level, Bed/Bath upstairs  Home Access: Stairs to enter with rails  Entrance Stairs - Number of Steps: garage entrance: 3-4 steps w/o rail- assisted by dtr; 2  flight of steps w/  Education  Patient Education  Education Given To: Patient  Education Provided: Role of Therapy, Plan of Care, Precautions, ADL Adaptive Strategies, Transfer Training, Energy Conservation, Fall Prevention Strategies, Mobility Training  Education Provided Comments: Educated pt on role of OT, POC, energy conservation techniques to manage SOB symptoms during functional tasks, safety awareness while completing functional transfers/mobility.  Education Method: Demonstration, Verbal  Barriers to Learning: Cognition  Education Outcome: Verbalized understanding, Demonstrated understanding, Continued education needed    Functional Outcome Measures  AM-PAC Daily Activity - Inpatient   How much help is needed for putting on and taking off regular lower body clothing?: A Lot  How much help is needed for bathing (which includes washing, rinsing, drying)?: A Lot  How much help is needed for toileting (which includes using toilet, bedpan, or urinal)?: A Little  How much help is needed for putting on and taking off regular upper body clothing?: A Lot  How much help is needed for taking care of personal grooming?: A Little  How much help for eating meals?: A Little  AM-[facility] Inpatient Daily Activity Raw Score: 15  AM-PAC Inpatient ADL T-Scale Score : 34.69  ADL Inpatient CMS 0-100% Score: 56.46  ADL Inpatient CMS G-Code Modifier : CK     Goals  Short Term Goals  Time Frame for Short Term Goals: By discharge  Short Term Goal 1: Pt will complete UB ADLs at SUP with good safety and use of modified techniques/AE as needed.  Short Term Goal 2: Pt will complete LB ADLs at [place] with good safety and use of modified techniques/AE as needed.  Short Term Goal 3: Pt will complete functional transfers/mobility from all surfaces at SUP with good safety and use of least restrictive device.  Short Term Goal 4: Pt will tolerate static/dynamic standing at SUP with use of least restrictive device to complete self-care/functional task of choice with good

## 2025-02-13 NOTE — CONSULTS
[facility] PULMONARY, CRITICAL CARE & SLEEP   Naresh Inman MD/ Aly HUMPHREY AGACNP-BC NP-C  Nicolle HUMPHREY NP-C  Krish HUMPHREY NP-C     CONSULT NOTE      Date of Admission: 2/12/2025  4:48 PM    Chief complaint: Dyspnea    Referring Physician: * No referring provider recorded for this case *  PCP: Elizabeth Rick MD     History of Present Illness: Myrna Smiley is a 79 y.o. female who presented to the ER yesterday for complaints of increasing shortness of breath.  Per ER notes she has a home oxygen concentrator that has not been working.  Daughter is present at bedside and states she has been on oxygen for over 3 years.  Patient now lives with the daughter.  She does not have a stationary concentrator at home.  She does not have oxygen tanks at home she has been living off of using the [employer].  Daughter states at baseline her oxygen is 3 to 4 L and she does increase to 5 to 6 L with exertion.  She did have a backup [employer] machine but daughter states when her main machine stopped working she tried the backup machine which also had an error code and they had no available oxygen for the patient therefore they called EMS.  They deny any recent illness.  They deny any cough cold sore throat congestion or other concerns.  Daughter states she has a history of significant COPD.  She states she was seen at the [facility] for couple years but is now transferring care down here and has an upcoming new patient appointment in May with Dr. Guan.  As of right now she does not have a current local pulmonologist.  At home she has been maintained on Symbicort and Spiriva.  She uses albuterol nebulizers 3 times a day.  She is on Monday/Wednesday/Friday Zithromax.  She is not on chronic steroids.  I was able to going to [facility] records and find a PFT from August 2023.  At that time FEV1 was 0.74 L with a ratio of 42.  DLCO 31 with correction to 67.  In  Imaging Interpretation: COPD, no acute          Assessment:    Acute on chronic hypoxic respiratory failure, [employer] machine broke at home, normally on 3 to 4 L at rest, 5 to 6 L with activity  Acute exacerbation COPD, previously followed at [facility], has upcoming appointment with Dr. Guan, FEV1 43% or 0.74 L with ratio of 42 and DLCO 31 with correction to 67  Severe anxiety  Hypertension  History of tobacco use, approximately 30-pack-year, quit 3 years ago  Full code       Plan:    Will place on scheduled IV Solu-Medrol  Continue Symbicort  Continue DuoNeb every 4 hours while awake  Agree with 3-day course of Zithromax  Currently at baseline oxygen, keep pulse ox above 88%  Will need stationary oxygen concentrator along with oxygen tanks at time of discharge.  Has been living at home with only [employer] machine for at least 3 years.  This was discussed in detail with daughter  Hopefully ready for discharge in the next 24 to 48 hours  No infectious symptom complaint  Outpatient follow-up as scheduled with Dr. Guan  Discussed with Dr. Rankin    Thank you for the kind consult, we will follow along with you      Nicolle Edward, NP-C, MSN  [facility] Pulmonary, Critical Care & Sleep    Electronically signed by KAM Infante - ALLA on 02/13/25

## 2025-02-13 NOTE — PLAN OF CARE
Problem: Discharge Planning  Goal: Discharge to home or other facility with appropriate resources  Outcome: Progressing     Problem: Safety - Adult  Goal: Free from fall injury  Outcome: Progressing  Note: Pt free from falls this shift.Bed alarm on, call light in place, 2x side rails raised, gripper socks on.

## 2025-02-13 NOTE — H&P
[facility]   IN-PATIENT SERVICE   [facility]    HISTORY AND PHYSICAL EXAMINATION            Date:   2/13/2025  Patient name:  Myrna Smiley  Date of admission:  2/12/2025  4:48 PM  MRN:   [id number]  Account:  [id number]  YOB: 1945  PCP:    Elizabeth Rick MD  Room:   [address] [place] [address]  Code Status:    Full Code    Chief Complaint:     Chief Complaint   Patient presents with    Shortness of Breath     Pt has concentrator at home that has not been working. Pt has been sob. Pt states worse in last week., pt c/o cough as well. Pt also has anxiety. Ems gave duoneb. Pt arrives on 5L O2. Pt has copd       History Obtained From:     patient    History of Present Illness:     The patient is a 79 y.o.  Non- / non  female who presents with Shortness of Breath (Pt has concentrator at home that has not been working. Pt has been sob. Pt states worse in last week., pt c/o cough as well. Pt also has anxiety. Ems gave duoneb. Pt arrives on 5L O2. Pt has copd)   and she is admitted to the hospital for the management of    Myrna Smiley is a 79 y.o. Non- / non  female with history of COPD, hyperlipidemia, hypertension, hypothyroidism, and thyroid disease who presents with Shortness of Breath (Pt has concentrator at home that has not been working. Pt has been sob. Pt states worse in last week., pt c/o cough as well. Pt also has anxiety. Ems gave duoneb. Pt arrives on 5L O2. Pt has copd)   and is admitted to the hospital for the management of COPD exacerbation and home nebulizer management.     According to patient, she has COPD on home oxygen.  She states that her home oxygen concentrator has only been working intermittently.  The patient's daughter tried to hook her up to her backup nebulizer, but that was not working either.  As a result, she has been experiencing increased shortness of breath.  She also endorses an increased level of a productive cough.  However,  mg/dL    BUN 15 8 - 23 mg/dL    Creatinine 0.7 0.7 - 1.2 mg/dL    Est, Glom Filt Rate 87 >60 mL/min/1.73m2    Calcium 9.0 8.6 - 10.4 mg/dL   CBC with Auto Differential    Collection Time: 02/13/25  5:41 AM   Result Value Ref Range    WBC 5.3 3.5 - 11.0 k/uL    RBC 4.32 4.0 - 5.2 m/uL    Hemoglobin 13.8 12.0 - 16.0 g/dL    Hematocrit 42.2 36 - 46 %    MCV 97.7 80 - 100 fL    MCH 31.9 26 - 34 pg    MCHC 32.7 31 - 37 g/dL    RDW 12.9 11.5 - 14.9 %    Platelets 207 150 - 450 k/uL    MPV 8.7 6.0 - 12.0 fL    Neutrophils % 83 (H) 36 - 66 %    Lymphocytes % 15 (L) 24 - 44 %    Monocytes % 2 1 - 7 %    Eosinophils % 0 0 - 4 %    Basophils % 0 0 - 2 %    Neutrophils Absolute 4.40 1.3 - 9.1 k/uL    Lymphocytes Absolute 0.80 (L) 1.0 - 4.8 k/uL    Monocytes Absolute 0.10 0.1 - 1.3 k/uL    Eosinophils Absolute 0.00 0.0 - 0.4 k/uL    Basophils Absolute 0.00 0.0 - 0.2 k/uL       Imaging/Diagnostics:        Assessment :      Primary Problem  COPD exacerbation (HCC)    Active Hospital Problems    Diagnosis Date Noted    COPD exacerbation (HCC) [J44.1] 01/22/2023     Priority: Medium       Plan:     Patient status Admit as inpatient in the  Progressive Unit/Step down    Acute on chronic hypoxic respiratory failure, secondary to COPD exacerbation, x-ray showed stable changes, no leukocytosis, Z-Javier for COPD exacerbation, check upper respiratory panel, continue IV steroids bronchodilators pulm consult  Hypertension blood pressure suboptimally controlled likely secondary to lying respiratory distress,  Will keep amlodipine continue to monitor blood pressure continues to be hypertensive will adjust the dose  Hypothyroidism, on levothyroxine, TSH was checked September 2024 was no  Depression and anxiety on BuSpar, has been getting more anxious due to respiratory distress, received a dose of Ativan last night which did help somewhat, ordered Atarax as needed  D-dimer checked negative next    Consultations:   IP CONSULT TO PULMONOLOGY  IP

## 2025-02-13 NOTE — PROGRESS NOTES
ADMIT TO PCU      [place] [facility]     Residence prior to admit: Home with daughter    Details that led to admit: O2 concentrator broke, increase shortness of breath     Services prior to admit: home oxygen    Patient is in bed.  Patient is A&Ox3 and in no distress at this time.  Bed in lowest and locked position.  Call light within patient's reach.  Telemetry applied.  See posted strip in the chart. Vitals obtained at time of admit to PCU.

## 2025-02-13 NOTE — CARE COORDINATION
Case Management Assessment  Initial Evaluation    Date/Time of Evaluation: 2/13/2025 3:41 PM  Assessment Completed by: Keyla Jurado    If patient is discharged prior to next notation, then this note serves as note for discharge by case management.    Patient Name: Myrna Smiley                   YOB: 1945  Diagnosis: COPD exacerbation (HCC) [J44.1]                   Date / Time: 2/12/2025  4:48 PM    Patient Admission Status: Inpatient   Readmission Risk (Low < 19, Mod (19-27), High > 27): Readmission Risk Score: 8.6    Current PCP: Elizabeth Rick MD  PCP verified by CM? No    Chart Reviewed: Yes      History Provided by: Patient  Patient Orientation: Alert and Oriented    Patient Cognition: Alert    Hospitalization in the last 30 days (Readmission):  No    If yes, Readmission Assessment in CM Navigator will be completed.    Advance Directives:      Code Status: Full Code   Patient's Primary Decision Maker is: Legal Next of Kin    Primary Decision Maker: Nadya Hardwick  Child - [phone]    Primary Decision Maker: Saman Hardwick - [phone]    Discharge Planning:    Patient lives with: Children Type of Home: House  Primary Care Giver: Self  Patient Support Systems include: Children, Family Members   Current Financial resources: Medicare  Current community resources:    Current services prior to admission: Oxygen Therapy            Current DME:              Type of Home Care services:  None    ADLS  Prior functional level: Independent in ADLs/IADLs  Current functional level: Independent in ADLs/IADLs    PT AM-PAC: 16 /24  OT AM-PAC: 15 /24    Family can provide assistance at DC: No  Would you like Case Management to discuss the discharge plan with any other family members/significant others, and if so, who? No  Plans to Return to Present Housing: Yes  Other Identified Issues/Barriers to RETURNING to current housing: no  Potential Assistance needed at discharge: Home Care             Discharge Plan? Yes    Keyla Jurado  Case Management Department  Ph:  Fax:

## 2025-02-13 NOTE — PROGRESS NOTES
RN spoke with daughter at bedside. Daughter, Nadya, would like to speak with case management in the morning about patient's oxygen at home.   If possible, daughter would like to be called prior to case management meeting with the patient so daughter can be present for conversation. Family states that patient has been refusing to go to doctor's appointments due to the cold weather and does have memory issues.     RN notified family that this information will be passed along to day shift RN.

## 2025-02-14 ENCOUNTER — TELEPHONE (OUTPATIENT)
Dept: INTERNAL MEDICINE CLINIC | Age: 80
End: 2025-02-14

## 2025-02-14 VITALS
OXYGEN SATURATION: 95 % | DIASTOLIC BLOOD PRESSURE: 67 MMHG | RESPIRATION RATE: 20 BRPM | HEART RATE: 110 BPM | SYSTOLIC BLOOD PRESSURE: 158 MMHG | WEIGHT: 130 LBS | TEMPERATURE: 98.1 F | BODY MASS INDEX: 22.2 KG/M2 | HEIGHT: 64 IN

## 2025-02-14 LAB
ANION GAP SERPL CALCULATED.3IONS-SCNC: 12 MMOL/L (ref 9–16)
BASOPHILS # BLD: 0 K/UL (ref 0–0.2)
BASOPHILS NFR BLD: 0 % (ref 0–2)
BUN SERPL-MCNC: 15 MG/DL (ref 8–23)
CALCIUM SERPL-MCNC: 9.4 MG/DL (ref 8.6–10.4)
CHLORIDE SERPL-SCNC: 102 MMOL/L (ref 98–107)
CO2 SERPL-SCNC: 23 MMOL/L (ref 20–31)
CREAT SERPL-MCNC: 0.8 MG/DL (ref 0.7–1.2)
EOSINOPHIL # BLD: 0 K/UL (ref 0–0.4)
EOSINOPHILS RELATIVE PERCENT: 0 % (ref 0–4)
ERYTHROCYTE [DISTWIDTH] IN BLOOD BY AUTOMATED COUNT: 13 % (ref 11.5–14.9)
GFR, ESTIMATED: 74 ML/MIN/1.73M2
GLUCOSE SERPL-MCNC: 145 MG/DL (ref 74–99)
HCT VFR BLD AUTO: 41 % (ref 36–46)
HGB BLD-MCNC: 13.7 G/DL (ref 12–16)
LYMPHOCYTES NFR BLD: 0.8 K/UL (ref 1–4.8)
LYMPHOCYTES RELATIVE PERCENT: 10 % (ref 24–44)
MCH RBC QN AUTO: 31.9 PG (ref 26–34)
MCHC RBC AUTO-ENTMCNC: 33.5 G/DL (ref 31–37)
MCV RBC AUTO: 95.3 FL (ref 80–100)
MONOCYTES NFR BLD: 0.3 K/UL (ref 0.1–1.3)
MONOCYTES NFR BLD: 3 % (ref 1–7)
NEUTROPHILS NFR BLD: 87 % (ref 36–66)
NEUTS SEG NFR BLD: 7.2 K/UL (ref 1.3–9.1)
PLATELET # BLD AUTO: 217 K/UL (ref 150–450)
PMV BLD AUTO: 8.6 FL (ref 6–12)
POTASSIUM SERPL-SCNC: 4.1 MMOL/L (ref 3.7–5.3)
RBC # BLD AUTO: 4.3 M/UL (ref 4–5.2)
SODIUM SERPL-SCNC: 137 MMOL/L (ref 136–145)
WBC OTHER # BLD: 8.2 K/UL (ref 3.5–11)

## 2025-02-14 PROCEDURE — 6360000002 HC RX W HCPCS

## 2025-02-14 PROCEDURE — 6370000000 HC RX 637 (ALT 250 FOR IP)

## 2025-02-14 PROCEDURE — 80048 BASIC METABOLIC PNL TOTAL CA: CPT

## 2025-02-14 PROCEDURE — 2500000003 HC RX 250 WO HCPCS

## 2025-02-14 PROCEDURE — 97110 THERAPEUTIC EXERCISES: CPT

## 2025-02-14 PROCEDURE — 94640 AIRWAY INHALATION TREATMENT: CPT

## 2025-02-14 PROCEDURE — 2500000003 HC RX 250 WO HCPCS: Performed by: NURSE PRACTITIONER

## 2025-02-14 PROCEDURE — 6360000002 HC RX W HCPCS: Performed by: NURSE PRACTITIONER

## 2025-02-14 PROCEDURE — 2580000003 HC RX 258: Performed by: NURSE PRACTITIONER

## 2025-02-14 PROCEDURE — 6370000000 HC RX 637 (ALT 250 FOR IP): Performed by: INTERNAL MEDICINE

## 2025-02-14 PROCEDURE — 2700000000 HC OXYGEN THERAPY PER DAY

## 2025-02-14 PROCEDURE — 85025 COMPLETE CBC W/AUTO DIFF WBC: CPT

## 2025-02-14 PROCEDURE — 36415 COLL VENOUS BLD VENIPUNCTURE: CPT

## 2025-02-14 PROCEDURE — 99223 1ST HOSP IP/OBS HIGH 75: CPT | Performed by: NURSE PRACTITIONER

## 2025-02-14 RX ORDER — AZITHROMYCIN 500 MG/1
500 TABLET, FILM COATED ORAL DAILY
Qty: 1 PACKET | Refills: 0 | Status: SHIPPED | OUTPATIENT
Start: 2025-02-14 | End: 2025-02-17

## 2025-02-14 RX ORDER — IPRATROPIUM BROMIDE AND ALBUTEROL SULFATE 2.5; .5 MG/3ML; MG/3ML
1 SOLUTION RESPIRATORY (INHALATION) 4 TIMES DAILY
Qty: 360 ML | Refills: 3 | Status: SHIPPED | OUTPATIENT
Start: 2025-02-14

## 2025-02-14 RX ORDER — PREDNISONE 10 MG/1
TABLET ORAL
Qty: 18 TABLET | Refills: 0 | Status: SHIPPED | OUTPATIENT
Start: 2025-02-14 | End: 2025-02-24

## 2025-02-14 RX ORDER — CEPHALEXIN 500 MG/1
500 CAPSULE ORAL 3 TIMES DAILY
Qty: 15 CAPSULE | Refills: 0 | Status: SHIPPED | OUTPATIENT
Start: 2025-02-14 | End: 2025-02-19

## 2025-02-14 RX ORDER — HYDROXYZINE HYDROCHLORIDE 10 MG/1
25 TABLET, FILM COATED ORAL EVERY 8 HOURS PRN
Qty: 30 TABLET | Refills: 1 | Status: SHIPPED | OUTPATIENT
Start: 2025-02-14

## 2025-02-14 RX ORDER — OXYCODONE HYDROCHLORIDE 5 MG/1
2.5 TABLET ORAL EVERY 8 HOURS PRN
Status: DISCONTINUED | OUTPATIENT
Start: 2025-02-14 | End: 2025-02-14 | Stop reason: HOSPADM

## 2025-02-14 RX ADMIN — ENOXAPARIN SODIUM 40 MG: 100 INJECTION SUBCUTANEOUS at 08:54

## 2025-02-14 RX ADMIN — HYDROXYZINE HYDROCHLORIDE 10 MG: 10 TABLET, FILM COATED ORAL at 04:31

## 2025-02-14 RX ADMIN — WATER 40 MG: 1 INJECTION INTRAMUSCULAR; INTRAVENOUS; SUBCUTANEOUS at 13:27

## 2025-02-14 RX ADMIN — IPRATROPIUM BROMIDE AND ALBUTEROL SULFATE 1 DOSE: .5; 2.5 SOLUTION RESPIRATORY (INHALATION) at 11:09

## 2025-02-14 RX ADMIN — IPRATROPIUM BROMIDE AND ALBUTEROL SULFATE 1 DOSE: .5; 2.5 SOLUTION RESPIRATORY (INHALATION) at 15:18

## 2025-02-14 RX ADMIN — LEVOTHYROXINE SODIUM 75 MCG: 0.07 TABLET ORAL at 06:40

## 2025-02-14 RX ADMIN — GUAIFENESIN 1200 MG: 600 TABLET ORAL at 08:53

## 2025-02-14 RX ADMIN — ACETAMINOPHEN 650 MG: 325 TABLET ORAL at 13:27

## 2025-02-14 RX ADMIN — SODIUM CHLORIDE, PRESERVATIVE FREE 10 ML: 5 INJECTION INTRAVENOUS at 08:54

## 2025-02-14 RX ADMIN — WATER 40 MG: 1 INJECTION INTRAMUSCULAR; INTRAVENOUS; SUBCUTANEOUS at 06:40

## 2025-02-14 RX ADMIN — BUDESONIDE AND FORMOTEROL FUMARATE DIHYDRATE 2 PUFF: 160; 4.5 AEROSOL RESPIRATORY (INHALATION) at 07:41

## 2025-02-14 RX ADMIN — HYDROXYZINE HYDROCHLORIDE 10 MG: 10 TABLET, FILM COATED ORAL at 13:29

## 2025-02-14 RX ADMIN — AZITHROMYCIN MONOHYDRATE 500 MG: 500 INJECTION, POWDER, LYOPHILIZED, FOR SOLUTION INTRAVENOUS at 09:03

## 2025-02-14 RX ADMIN — AMLODIPINE BESYLATE 5 MG: 5 TABLET ORAL at 08:53

## 2025-02-14 RX ADMIN — BUSPIRONE HYDROCHLORIDE 15 MG: 15 TABLET ORAL at 08:53

## 2025-02-14 RX ADMIN — IPRATROPIUM BROMIDE AND ALBUTEROL SULFATE 1 DOSE: .5; 2.5 SOLUTION RESPIRATORY (INHALATION) at 07:40

## 2025-02-14 RX ADMIN — ATORVASTATIN CALCIUM 20 MG: 20 TABLET, FILM COATED ORAL at 08:53

## 2025-02-14 ASSESSMENT — PAIN SCALES - GENERAL: PAINLEVEL_OUTOF10: 8

## 2025-02-14 ASSESSMENT — PAIN DESCRIPTION - LOCATION: LOCATION: BACK

## 2025-02-14 NOTE — TELEPHONE ENCOUNTER
Faith called from [facility] to see if Dr. Rick will follow for home health services. Confirmed

## 2025-02-14 NOTE — PROGRESS NOTES
[facility] PULMONARY,CRITICAL CARE & SLEEP   Naresh Inman MD/Aly HUMPHREY AGACANDYP-BC, NP-C      Nicolle HUMPHREY NP-C     Krish HUMPHREY NP-C                                          Pulmonary Progress Note    Patient - Myrna Smiley   Age - 79 y.o.   - 1945  MRN - [id number]  Acct # - [id number]  Date of Admission - 2025  4:48 PM    Consulting Service/Physician:       Primary Care Physician: Elizabeth Rick MD    SUBJECTIVE:     Chief Complaint:   Chief Complaint   Patient presents with    Shortness of Breath     Pt has concentrator at home that has not been working. Pt has been sob. Pt states worse in last week., pt c/o cough as well. Pt also has anxiety. Ems gave duoneb. Pt arrives on 5L O2. Pt has copd     Subjective:    Myrna states she is feeling good today.  Plan is for discharge home.  They have secured portable oxygen and will get stationary oxygen concentrator at home.  She is currently on her baseline of 3 L.  She denies other concerns.  She has been afebrile.  Respiratory viral panel was negative.    VITALS  BP (!) 158/67   Pulse 98   Temp 98.1 °F (36.7 °C) (Oral)   Resp 24   Ht 1.626 m (5' 4\")   Wt 59 kg (130 lb)   SpO2 95%   BMI 22.31 kg/m²   Wt Readings from Last 3 Encounters:   25 59 kg (130 lb)   10/04/24 60.8 kg (134 lb)   24 61.7 kg (136 lb)     I/O (24 Hours)  No intake or output data in the 24 hours ending 25 1359  Ventilator:      Exam:   Physical Exam   Constitutional: Elderly female sitting up in bed on 3 L in no acute distress  HENT: Unremarkable  Head: Normocephalic and atraumatic.   Eyes: EOM are normal. Pupils are equal, round, and reactive to light.   Neck: Neck supple.   Cardiovascular:  Regular rate and rhythm.  Normal heart tones.  No JVD.    Pulmonary/Chest: Respirations even and nonlabored, diminished throughout, on 3 L with pulse ox 95%  Abdominal: Soft. Bowel sounds are normal.         ASSESSMENT:       Acute on chronic hypoxic respiratory failure, Inogen machine broke at home, normally on 3 to 4 L at rest, 5 to 6 L with activity  Acute exacerbation COPD, previously followed at [facility], has upcoming appointment with Dr. Guan, FEV1 43% or 0.74 L with ratio of 42 and DLCO 31 with correction to 67  Severe anxiety  Hypertension  History of tobacco use, approximately 30-pack-year, quit 3 years ago  Full code  PLAN:   No objection to discharge home from pulmonary standpoint  Prednisone taper at discharge  To continue on regular bronchodilators  Strongly recommend outpatient pulmonary follow-up  Discussed that she is to use stationary concentrator at home.  Portable oxygen concentrator's are not made to run 24/7      Electronically signed by KAM Infante CNP on 02/14/25     This progress note was completed using a voice transcription system. Every effort was made to ensure accuracy. However, inadvertent computerized transcription errors may be present.    Nicolle Edward, NP-C, MSN  [facility] Pul[facility], Critical Care & Sleep

## 2025-02-14 NOTE — PROGRESS NOTES
Patient was evaluated today for the diagnosis of COPD.  I entered a DME order for home oxygen at 3 lpm because the diagnosis and testing require the patient to have supplemental oxygen.  Condition will improve or be benefited by oxygen use.  The patient is  able to perform good mobility in a home setting and therefore does require the use of a portable oxygen system.  The need for this equipment was discussed with the patient and she understands and is in agreement.     Electronically signed by Dominique Danielson MD on 2/14/2025 at 12:04 PM

## 2025-02-14 NOTE — CARE COORDINATION
Class  Clinic Performed        Associated Diagnosis:  COPD exacerbation (HCC) (J44.1 [ICD-10-CM])        Comments:   You must complete the order parameters below and add the medical necessity documentation for this DME in a separate note.     Stationary Oxygen Concentrator at 3 lpm via Nasal Cannula     Stationary Prescribed at:  Continuous     Portable Gaseous O2 System and contents at 3 lpm via Nasal Cannula     1-2hrs/day     Diagnosis: COPD  Length of need: Lifetime            Scheduling Instructions:                                 Specimen Source             Collection Date    Collection Time    Order Status    Expected Date                 Electronically Signed By  Dominique Danielson MD  NPI:  [id number] Date  Feb 14, 2025  12:04 PM               Responsible Party /Guarantor Information     Guar-ActID   Relationship Account Type Home Phone   DANYELLE ROSS - [phone]  [facility] / [place] [address] Self P/F [phone]   Employer   Work Phone   UNKNOWN                  Insurance & Policy [place] Information         Primary Insurance:  Insurance/Subscriber ID:  [id number]  Subscriber Name:  DANYELLE ROSS              Relationship to Patient: Self  Signed ABN: N       Payor Name:  [employer] MEDICARE   Plan:  [employer] CHOICE-PPO MEDICARE   Group: [id number]  Worker's Comp Date of Injury:

## 2025-02-14 NOTE — CARE COORDINATION
ONGOING DISCHARGE PLAN:    Patient is alert and oriented x4.    Spoke with patient regarding discharge plan and patient confirms that plan is still to discharge to home with [facility]    Remote Monitoring ordered    Oxygen ordered ordered from [facility]    Possible discharge to home today     Palliative care consult     Will continue to follow for additional discharge needs.    If patient is discharged prior to next notation, then this note serves as note for discharge by case management.    Electronically signed by Keyla Jurado on 2/14/2025 at 12:32 PM

## 2025-02-14 NOTE — ACP (ADVANCE CARE PLANNING)
.Advance Care Planning      Palliative Medicine Provider (MD/NP)  Advance Care Planning (ACP) Conversation      Date of Conversation: 02/14/25  The patient and/or authorized decision maker consented to a voluntary Advance Care Planning conversation.   Individuals present for the conversation:   Patient with decision making capacity and daughter Nadya    Legal Healthcare Agent(s):    Primary Decision Maker: Nadya Hardwick - Child - [phone]    Secondary Decision Maker: Saman Hardwick - Child - [phone]    Supplemental (Other) Decision Maker: Bhaskar Zapata - Son-in-Law - [phone]    ACP documents available in EMR prior to discussion:  -None    Primary Palliative Diagnosis(es):  COPD    Conversation Summary:Discussed goals and code status. She has elected for DNRCC code status. She does not want any aggressive treatment or any resuscitation. She is wanting informational hospice meeting once she gets home. She is going home with  palliative care and home care today.       Resuscitation Status:    Code Status: DNR-CC    Outcomes / Completed Documentation:  An explanation of advance directives and their importance was provided and the following forms completed:    -Power of  for Healthcare and -Portable DNR    If new document completed, original was provided to patient and/or family member.    Copy was placed for scanning into the [facility] EMR.      I spent 20 minutes providing separately identifiable ACP services with the patient and/or surrogate decision maker in a voluntary, in-person conversation discussing the patient's wishes and goals as detailed in the above note.       SALOMON ROSS, APRN - CNP

## 2025-02-14 NOTE — CARE COORDINATION
Remote Monitoring Ordered, accepted by patient.    Kit # [id number]     Electronically signed by Keyla Jurado on 2/14/2025 at 2:53 PM

## 2025-02-14 NOTE — CONSULTS
.  Palliative Care Inpatient Consult    NAME:  Myrna Smiley  MEDICAL RECORD NUMBER:  [id number]  AGE: 79 y.o.   GENDER: female  : 1945  TODAY'S DATE:  2025    Reasons for Consultation:    Symptom and/or pain management  Provision of information regarding PC and/or hospice philosophies  Complex, time-intensive communication and interdisciplinary psychosocial support  Clarification of goals of care and/or assistance with difficult decision-making  Guidance in regards to resources and transition(s)    Members of PC team contributing to this consultation are : Raisa Agosto, Palliative Care APRN-CNP    Plan      Palliative Interaction: I went to see patient and she was sitting up in bed awake with daughter Nadya at bedside. I explained my role to them.     I discussed patients chronic history. She lives on second story of home with daughter on . She reports that she is not able to go up and down stairs. She reports to be on NC at home. She reports that she needs help with all her ADL's d/t her chronic SOB/fatigue.     We discussed her hospitalized problems. She reports feeling better and wanting to go home.     I discussed goals and she reports that she no longer wants to come back to the hospital. She reports that she is wanting her symptoms managed at home. We discussed palliative care vs hospice care. Patient tells me that her goals is to remain at home. We discussed how hospice will not aggressively treat and will only focus on symptoms until EOL.     I reviewed all three code status classifications with them. Patient reports not wanting any ICU treatment or any resuscitation including CPR, meds, shocking or intubation. She has elected for DNRCC code status. Daughter reports that she does believe this is her moms wishes.     I discussed HCPOA and she reports needing to find old one. She reports needing to contact  to complete new one. I explained to them that HCPOA can be done in the hospital if    Vaping Use    Vaping status: Never Used   Substance Use Topics    Alcohol use: Not Currently     Alcohol/week: 2.0 standard drinks of alcohol     Types: 2 Glasses of wine per week    Drug use: Never       FAMILY HISTORY  ..  Family History   Problem Relation Age of Onset    No Known Problems Mother     No Known Problems Father         ALLERGIES  Allergies   Allergen Reactions    Ibuprofen Other (See Comments)     Caused ulcers, advised by GI to not take IBU.          MEDICATIONS  Current Medications    methylPREDNISolone  40 mg IntraVENous Q8H    amLODIPine  5 mg Oral Daily    atorvastatin  20 mg Oral Daily    busPIRone  15 mg Oral BID    levothyroxine  75 mcg Oral Daily    sodium chloride flush  5-40 mL IntraVENous 2 times per day    enoxaparin  40 mg SubCUTAneous Daily    ipratropium 0.5 mg-albuterol 2.5 mg  1 Dose Inhalation Q4H WA RT    budesonide-formoterol  2 puff Inhalation BID    [Held by provider] tiotropium  1 puff Inhalation Daily RT    guaiFENesin  1,200 mg Oral BID     hydrOXYzine HCl, Benzocaine-Menthol, albuterol sulfate HFA, ipratropium 0.5 mg-albuterol 2.5 mg, melatonin, sodium chloride flush, sodium chloride, ondansetron **OR** ondansetron, polyethylene glycol, acetaminophen **OR** acetaminophen  IV Drips/Infusions   sodium chloride 50 mL/hr at 02/13/25 1342    sodium chloride       Home Medications  No current facility-administered medications on file prior to encounter.     Current Outpatient Medications on File Prior to Encounter   Medication Sig Dispense Refill    busPIRone (BUSPAR) 15 MG tablet Take 15 mg by mouth 2 times daily 60 tablet 3    pantoprazole (PROTONIX) 40 MG tablet TAKE 1 TABLET BY MOUTH EVERY MORNING AND 1 TABLET EVERY EVENING 180 tablet 1    levothyroxine (SYNTHROID) 75 MCG tablet TAKE 1 TABLET EVERY DAY 90 tablet 1    Omega 3-6-9 Fatty Acids (TRIPLE OMEGA COMPLEX PO) Take by mouth      vitamin B-12 (CYANOCOBALAMIN) 1000 MCG tablet Take 1 tablet by mouth daily

## 2025-02-14 NOTE — PROGRESS NOTES
Home Oxygen Evaluation    Room air SpO2 at Rest =87%    Room air with exercise/exertion =83%    Pt needs 3lpm continuous Sp02 94%

## 2025-02-14 NOTE — DISCHARGE INSTR - COC
Continuity of Care Form    Patient Name: Myrna Smiley   :  1945  MRN:  [id number]    Admit date:  2025  Discharge date:  25    Code Status Order: Full Code   Advance Directives:   Advance Care Flowsheet Documentation             Admitting Physician:  Sharon Lentz MD  PCP: Elizabeth Rick MD    Discharging Nurse: SALMA Lee RN  Discharging Hospital Unit/Room#: 2122/2122-01  Discharging Unit Phone Number: [phone]    Emergency Contact:   Extended Emergency Contact Information  Primary Emergency Contact: Nadya Hardwick  Home Phone: [phone]  Mobile Phone: [phone]  Relation: Child   needed? No  Secondary Emergency Contact: Saman Hardwick  Home Phone: [phone]  Mobile Phone: [phone]  Relation: Child   needed? No    Past Surgical History:  Past Surgical History:   Procedure Laterality Date    UPPER GASTROINTESTINAL ENDOSCOPY N/A 2022    EGD performed by Agustin Metcalf [facility] at [facility]    UPPER GASTROINTESTINAL ENDOSCOPY N/A 2022    EGD BIOPSY performed by Agustin Metcalf MD at [facility]       Immunization History:   Immunization History   Administered Date(s) Administered    COVID-19, J&J, (age 18y+), IM, 0.5 mL 2021    Influenza, FLUAD, (age 65 y+), IM, Quadv, 0.5mL 2020, 10/04/2021    Influenza, FLUAD, (age 65 y+), IM, Trivalent PF, 0.5mL 10/28/2019    Pneumococcal Conjugate 7-valent (Prevnar7) 2015    Pneumococcal, PPSV23, PNEUMOVAX 23, (age 2y+), SC/IM, 0.5mL 10/03/2011       Active Problems:  Patient Active Problem List   Diagnosis Code    Anemia D64.9    Hypothyroidism E03.9    COPD (chronic obstructive pulmonary disease) (HCC) J44.9    Duodenal ulcer K26.9    Acute midline low back pain without sciatica M54.50    Spondylolysis with spondylolisthesis M43.10    Spinal stenosis, lumbar region, without neurogenic claudication M48.061    Hyperlipidemia E78.5    Tobacco abuse Z72.0    Viral URI J06.9    History of GI bleed  Z87.19    COPD exacerbation ([facility]) J44.1    Acute bronchitis due to other specified organisms J20.8       Isolation/Infection:   Isolation            No Isolation          Patient Infection Status       None to display                     Nurse Assessment:  Last Vital Signs: BP (!) 163/79   Pulse (!) 109   Temp 98.1 °F (36.7 °C) (Oral)   Resp 20   Ht 1.626 m (5' 4\")   Wt 59 kg (130 lb)   SpO2 97%   BMI 22.31 kg/m²     Last documented pain score (0-10 scale): Pain Level: 2  Last Weight:   Wt Readings from Last 1 Encounters:   02/12/25 59 kg (130 lb)     Mental Status:  oriented    IV Access:  - None    Nursing Mobility/ADLs:  Walking   Independent  Transfer  Independent  Bathing  Independent  Dressing  Independent  Toileting  Independent  Feeding  Independent  Med Admin  Independent  Med Delivery   whole    Wound Care Documentation and Therapy:        Elimination:  Continence:   Bowel: Yes  Bladder: Yes  Urinary Catheter: None   Colostomy/Ileostomy/Ileal Conduit: No       Date of Last BM: 02/11/25  No intake or output data in the 24 hours ending 02/14/25 0956  No intake/output data recorded.    Safety Concerns:     At Risk for Falls, high anxiety    Impairments/Disabilities:      None    Nutrition Therapy:  Current Nutrition Therapy:   - Oral Diet:  General    Routes of Feeding: Oral  Liquids: Thin Liquids  Daily Fluid Restriction: no  Last Modified Barium Swallow with Video (Video Swallowing Test): not done    Treatments at the Time of Hospital Discharge:   Respiratory Treatments: oxygen, inhalers, nebulizers  Oxygen Therapy:  is on oxygen at 3 L/min per nasal cannula.  Ventilator:    - No ventilator support    Rehab Therapies: Physical Therapy and Occupational Therapy  Weight Bearing Status/Restrictions:   Other Medical Equipment (for information only, NOT a DME order):    Other Treatments: Skilled Nursing assessment and monitoring. Medication education and monitoring per protocol.     Please follow for

## 2025-02-14 NOTE — FLOWSHEET NOTE
02/13/25 1956   Treatment Team Notification   Reason for Communication Evaluate   Name of Team Member Notified GUMARO Bowen   Treatment Team Role Advanced Practice Nurse   Method of Communication Secure Message   Response Waiting for response   Notification Time 1956     Notified NP about Pt requesting a cough drop for sore throat.    2031: NP ordered lozenge

## 2025-02-14 NOTE — PROGRESS NOTES
Physical Therapy Cancel Note      DATE: 2025    NAME: Myrna Smiley  MRN: [id number]   : 1945      Patient not seen this date for Physical Therapy due to:    Pt is working with [facility] at this time and reports wanting to save energy before going home today.    Electronically signed by Josseline Gregg PTA on 2025 at 3:11 PM

## 2025-02-14 NOTE — PROGRESS NOTES
CLINICAL PHARMACY NOTE: MEDS TO BEDS    Total # of Prescriptions Filled: 4   The following medications were delivered to the patient:  Cephalexin 500MG Capsules   Prednisone 10MG Tablets   Hydroxyzine HCL 10MG Tablets   Ipratropium-Albuterol Solution     Additional Documentation:  Meds delivered to the room and signed for by Nadya Hardwick at 3:26PM 2/14/25. Cepacol OTC, not in stock, placed on hold.

## 2025-02-14 NOTE — PLAN OF CARE
Problem: Discharge Planning  Goal: Discharge to home or other facility with appropriate resources  2/14/2025 1354 by Jacqueline Lee RN  Outcome: Completed  2/14/2025 0251 by Jaclyn Tucker RN  Outcome: Progressing     Problem: Safety - Adult  Goal: Free from fall injury  2/14/2025 1354 by Jacqueline Lee RN  Outcome: Completed  2/14/2025 0251 by Jaclyn Tucker RN  Outcome: Progressing     Problem: ABCDS Injury Assessment  Goal: Absence of physical injury  2/14/2025 1354 by Jacqueline Lee RN  Outcome: Completed  2/14/2025 0251 by Jaclyn Tucker RN  Outcome: Progressing     Problem: Respiratory - Adult  Goal: Achieves optimal ventilation and oxygenation  2/14/2025 1354 by Jacqueline Lee RN  Outcome: Completed  2/14/2025 0251 by Jaclyn Tucker RN  Outcome: Progressing

## 2025-02-14 NOTE — PLAN OF CARE
Problem: Discharge Planning  Goal: Discharge to home or other facility with appropriate resources  2/14/2025 0251 by Jaclyn Tucker RN  Outcome: Progressing     Problem: Safety - Adult  Goal: Free from fall injury  2/14/2025 0251 by Jaclyn Tucker RN  Outcome: Progressing     Problem: ABCDS Injury Assessment  Goal: Absence of physical injury  2/14/2025 0251 by Jaclyn Tucker RN  Outcome: Progressing     Problem: Respiratory - Adult  Goal: Achieves optimal ventilation and oxygenation  Outcome: Progressing

## 2025-02-14 NOTE — PROGRESS NOTES
Patient discharged to home per orders.  IV discontinued intact.  Discharge instructions reviewed written and verbal with patient and daughter.  Oxygen tank delivered to room as well as remote monitor for PCP follow ups.  Pt states all belongings are present.  Taken per wheelchair to awaiting vehicle.

## 2025-02-14 NOTE — PROGRESS NOTES
[facility]   INPATIENT OCCUPATIONAL THERAPY  PROGRESS NOTE  Date: 2025  Patient Name: Myrna Smiley       Room: -  MRN: [id number]    : 1945  (79 y.o.)  Gender: female   Referring Practitioner: Dorinda Bowen APRN - NP  Diagnosis: COPD exacerbation (HCC)      Discharge Recommendations:  Further Occupational Therapy is recommended upon facility discharge.    OT Equipment Recommendations  Other: CTA    Restrictions/Precautions  Restrictions/Precautions  Restrictions/Precautions: Fall Risk  Activity Level: Up as Tolerated  Required Braces or Orthoses?: No    O2 Device: Nasal cannula (3 L)    Subjective  Subjective  Subjective: Pt is pleasant and agreeable to therapy. Stating she is going home soon and has no concers.  Comments: OK per RN Jacqueline    Objective  Orientation  Overall Orientation Status: Within Functional Limits  Orientation Level: Oriented to time;Oriented to situation;Oriented to person;Oriented to place  Cognition  Overall Cognitive Status: Exceptions  Arousal/Alertness: Appears intact  Following Commands: Follows multistep commands with increased time;Follows multistep commands with repitition  Attention Span: Attends with cues to redirect  Safety Judgement: Decreased awareness of need for assistance;Decreased awareness of need for safety  Problem Solving: Assistance required to generate solutions;Assistance required to implement solutions  Insights: Decreased awareness of deficits  Initiation: Requires cues for some  Sequencing: Requires cues for some    Balance  Balance  Sitting Balance: Stand by assistance (seated EOB)    Transfers/Mobility  Bed mobility  Supine to Sit: Stand by assistance  Sit to Supine: Unable to assess  Scooting: Stand by assistance  Transfers  Sit to stand: Contact guard assistance  Stand to sit: Contact guard assistance  Transfer Comments: standing briefly at EOB         OT Exercises  Exercise Treatment: Pt engaged in BUE HEP  with med resistance therapy band. Completing 10 reps in all tollerable planes. Completed to increase strength and endurance for ADL and IADL activites    Patient Education  Patient Education  Education Given To: Patient  Education Provided: Role of Therapy, Plan of Care, Precautions, ADL Adaptive Strategies, Transfer Training, Energy Conservation, Fall Prevention Strategies, Mobility Training, Home Exercise Program, Family Education, Equipment  Education Provided Comments: Educated pt on role of OT, POC, energy conservation techniques to manage SOB symptoms during functional tasks, safety awareness while completing functional transfers/mobility.  Education Method: Demonstration, Verbal  Barriers to Learning: Cognition  Education Outcome: Verbalized understanding, Demonstrated understanding, Continued education needed    Goals  Short Term Goals  Time Frame for Short Term Goals: By discharge  Short Term Goal 1: Pt will complete UB ADLs at SUP with good safety and use of modified techniques/AE as needed.  Short Term Goal 2: Pt will complete LB ADLs at [place] with good safety and use of modified techniques/AE as needed.  Short Term Goal 3: Pt will complete functional transfers/mobility from all surfaces at SUP with good safety and use of least restrictive device.  Short Term Goal 4: Pt will tolerate static/dynamic standing at SUP with use of least restrictive device to complete self-care/functional task of choice with good safety.  Short Term Goal 5: Pt will verbalize/demonstrate 1-2 energy conservation techniques IND to improve overall activity tolerance and increase safety and participation in self-care/functional tasks.  Short Term Goal 6: Pt will actively engage in 15+ minutes of therapeutic exercise and functional activities to increase independence and participation in self-care/functional tasks.  Occupational Therapy Plan  Times Per Week: 4-6  Current Treatment Recommendations: Strengthening, ROM, Balance training,

## 2025-02-17 ENCOUNTER — CARE COORDINATION (OUTPATIENT)
Dept: CARE COORDINATION | Age: 80
End: 2025-02-17

## 2025-02-17 DIAGNOSIS — J44.9 CHRONIC OBSTRUCTIVE PULMONARY DISEASE, UNSPECIFIED COPD TYPE (HCC): Primary | Chronic | ICD-10-CM

## 2025-02-17 PROCEDURE — 1111F DSCHRG MED/CURRENT MED MERGE: CPT

## 2025-02-17 RX ORDER — AMLODIPINE BESYLATE 5 MG/1
5 TABLET ORAL DAILY
Qty: 90 TABLET | Refills: 1 | Status: SHIPPED | OUTPATIENT
Start: 2025-02-17

## 2025-02-17 NOTE — PROGRESS NOTES
Remote Patient Monitoring Treatment Plan    Received request from [facility]/Raisa Dixon, RN to order remote patient monitoring for in home monitoring of COPD; Condition managed by Dr Elizabeth Rick MD PCP. (*Patient will be establishing care with pulmonology on 4/22/25 Dr Dejon Guan MD-[facility].) .  and order completed.     Patient will be monitoring blood pressure   pulse ox .      Patient will engage in Remote Patient Monitoring each day to develop the skills necessary for self management.       RPM Care Team Responsibilities:   Alerts will be reviewed daily and addressed within 2-4 hours during operational hours (Monday -Friday 9 am-4 pm)  Alert response and intervention documented in patient medical record  Alert response escalated to PCP per protocol and documented in patient medical record  Patient monitored over approximately  days  Discharge from program based on self-management readiness    See care coordination encounters for additional details.     Past Medical History:   Diagnosis Date    Arthritis        Past Surgical History:   Procedure Laterality Date    TUBAL LIGATION         Current Outpatient Medications   Medication Sig    alendronate (FOSAMAX) 70 MG tablet Take 70 mg by mouth every 7 days.    aspirin 81 mg Cap aspirin Take No date recorded No form recorded No frequency recorded No route recorded No set duration recorded No set duration amount recorded active No dosage strength recorded No dosage strength units of measure recorded    buPROPion (WELLBUTRIN XL) 300 MG 24 hr tablet Wellbutrin XL Take No date recorded No form recorded No frequency recorded No route recorded No set duration recorded No set duration amount recorded active No dosage strength recorded No dosage strength units of measure recorded    busPIRone (BUSPAR) 5 MG Tab Take 5 mg by mouth 2 (two) times daily.    meloxicam (MOBIC) 7.5 MG tablet Take 1 tablet (7.5 mg total) by mouth 2 (two) times a day.    omega-3 acid ethyl esters (LOVAZA) 1 gram capsule Take 1 capsule by mouth once daily.    omeprazole (PRILOSEC) 40 MG capsule omeprazole 40 mg capsule,delayed release    sulfaSALAzine (AZULFIDINE) 500 MG EC tablet   See Instructions, 1 tab(s) Oral BID, 0 Refill(s)     No current facility-administered medications for this visit.       Review of patient's allergies indicates:  No Known Allergies    History reviewed. No pertinent family history.    Social History     Socioeconomic History    Marital status: Unknown   Tobacco Use    Smoking status: Current Some Day Smoker    Smokeless tobacco: Never Used       Chief Complaint:   Chief Complaint   Patient presents with    Pre-op Exam     Pre op R TKA 7/7/22,        Consulting Physician: No ref. provider found    History of present illness:    This is a 60 y.o. year old female who presents today for preop evaluation for robotic assisted right total knee arthroplasty on July 7th.  No new complaints or concerns today.  She has a  "long standing history of osteoarthritis with ongoing medial-sided weight-bearing knee pain.  This is worse with activity as well.  This has decreased activities of daily living.  She has tried conservative treatment consisting of Synvisc injections, cortisone injections, physical therapy program, home exercises with no significant pain relief.  She is ready to undergo the operation    Review of Systems:    Constitution:   Denies chills, fever, and sweats.  HENT:   Denies headaches or blurry vision.  Cardiovascular:  Denies chest pain or irregular heart beat.  Respiratory:   Denies cough or shortness of breath.  Gastrointestinal:  Denies abdominal pain, nausea, or vomiting.  Musculoskeletal:   Denies muscle cramps.  Neurological:   Denies dizziness or focal weakness.  Psychiatric/Behavior: Normal mental status.  Hematology/Lymph:  Denies bleeding problem or easy bruising/bleeding.  Skin:    Denies rash or suspicious lesions.    Examination:    Vital Signs:    Vitals:    06/24/22 1020   BP: 129/67   Pulse: 62   Weight: 72.6 kg (160 lb)   Height: 5' 5" (1.651 m)       Body mass index is 26.63 kg/m².    Constitution:   Well-developed, well nourished patient in no acute distress.  Neurological:   Alert and oriented x 3 and cooperative to examination.     Psychiatric/Behavior: Normal mental status.  Respiratory:   No shortness of breath.  Eyes:    Extraoccular muscles intact  Skin:    No scars, rash or suspicious lesions.    Physical Exam:     Right Knee:     Grade effusion 1    No erythema or increased heat varus deformity    Patella demonstrated crepitus.    Anteromedial aspect was tender on palpation. Medial aspect was tender on palpation. Medial collateral ligament was tender on palpation.    No lateral joint line tenderness   Active flexion 120 degrees   Active extension 5 degrees   antalgic gait was observed.     X-Ray Knee: A complete knee x-ray with standing for 4 views was reviewed of the right knee "     IMPRESSIONS RADIOLOGY TEST   Narrowing of the joint space bone on bone in medial and patellofemoral compartments, and osteophytes arising from the knee.    Kellgren Mike grade 4 changes    Imaging:        Assessment:     Primary osteoarthritis of right knee    Impaired gait and mobility    Pre-operative laboratory examination        Plan:      Robotic assisted right total knee arthroplasty  We will use Ecotrin aspirin postoperatively for DVT prophylaxis  The proposed procedure as well as associated risks/benefits were discussed at length with the patient and family with risks to include pain, bleeding, infection, future surgeries, neurovascular compromise, loss of limb, heart attack, stroke, deep vein thrombosis, and even death. They understand and agree with the treatment plan.      DISCLAIMER: This note may have been dictated using voice recognition software and may contain grammatical errors.     NOTE: Consult report sent to referring provider via [employer] EMR.

## 2025-02-17 NOTE — CARE COORDINATION
Care Transitions Note    Initial Call - Call within 2 business days of discharge: Yes    Patient Current Location:  Home: [address] [place] [id number]    Care Transition Nurse contacted the family, daughter, Liane  by telephone to perform post hospital discharge assessment, verified name and  as identifiers. Provided introduction to self, and explanation of the Care Transition Nurse role.     Patient: Myrna Smiley    Patient : 1945   MRN: [id number]    Reason for Admission: COPD exacerbation   Discharge Date: 25  RURS: Readmission Risk Score: 7.7      Last Discharge Facility       Date Complaint Diagnosis Description Type Department Provider    25 Shortness of Breath COPD exacerbation (HCC) ... ED to Hosp-Admission (Discharged) (ADMITTED) Sharon Keith MD; Tima Ferrer...            Was this an external facility discharge? No    Additional needs identified to be addressed with provider   No needs identified             Method of communication with provider: none.    Patients top risk factors for readmission: medical condition-COPD    Interventions to address risk factors:   Education: Medications as ordered, RPM in home, follow up with PCP and specialists, monitor for worsening shortness of breath, O2 as ordered.    Care Summary Note: Spoke with patient's daughter, Liane for initial 24 hour follow up.  Patient came in with shortness of breath, was using her Inogen at home as her primary source of O2, did not have back up tanks or concentrator.  She was treated for COPD exacerbation, her O2 needs were addressed and she now has a concentrator at home.  Patient's daughter is living with her, spoke with daughter this morning.  Patient has a lot of anxiety, daughter said she has been taking Buspar and as of late her anxiety has been a little worse.  She has O2 on at present at 3L/min, O2 saturations running around 95% while at rest but with activity she does drop down to the upper

## 2025-02-18 ENCOUNTER — CARE COORDINATION (OUTPATIENT)
Dept: PRIMARY CARE CLINIC | Age: 80
End: 2025-02-18

## 2025-02-18 NOTE — CARE COORDINATION
Pt discharged home with [facility] kit-[facility]-CP-[id number]   Pt profile set up.  [facility] has been notified to contact pt for kit set up.

## 2025-02-19 ENCOUNTER — CARE COORDINATION (OUTPATIENT)
Dept: CASE MANAGEMENT | Age: 80
End: 2025-02-19

## 2025-02-19 NOTE — CARE COORDINATION
Remote Patient Monitoring Welcome Note  Date/Time:  2025 11:05 AM  Patient Current Location: Home: [address] [place] [id number]  Verified patients name and  as identifiers.       Completed and confirmed the following:    [x] Patient received all RPM equipment (tablet, scale, blood pressure device and cuff, and pulse oximeter)  Cuff Size: regular (9.05\"-15.74\")    Weight Scale:  none                    [x] Instructed patient keep box for use when returning equipment                                                          [x] Reviewed Patient Welcome Letter with patient    [x]  Reviewed Consent Form  Copy of consent form in chart.                 [x] Reviewed expectations for patient and care team  Monitoring hours M-F 9-4pm  It is important to take your vitals every day, even on the weekends,to keep your care team aware of how you are doing every day of the week.  Completing monitoring by 12pm on  so that alerts can be responded to in the same day  Patient weighs self at same time every day (or after urinating and waking up)  Take blood pressure 1-2 hrs after medications   RPM team may have different phone area code (including [place], [place], [place] or [place])                              [x] Instructed patient to keep scale on flat surface                                                         [x] Instructed patient to keep tablet plugged in at all times                         [x] Instructed how to contact IT support  ([phone])  [x] Provided Remote Patient Monitoring care  information     Emergency Contact Verified:  Nadya Hardwick [phone]               All questions answered at this time.

## 2025-02-20 ENCOUNTER — CARE COORDINATION (OUTPATIENT)
Dept: CARE COORDINATION | Age: 80
End: 2025-02-20

## 2025-02-20 NOTE — CARE COORDINATION
Care Transitions Note    Follow Up Call     Patient Current Location:  Home: [address] [place] [id number]    Care Transition Nurse contacted the patient by telephone. Verified name and  as identifiers.    Additional needs identified to be addressed with provider   No needs identified                 Method of communication with provider: none.    Care Summary Note: Spoke with patient's daughter Nadya today for transitional follow up call. Patient was called earlier this week, was in the hospital with COPD exacerbation.  Patient had discharged to home with RPM kit, this is now set up and running, was able to view vitals today which were WNL, blood pressure on the higher side but O2 saturations running in the mid 90's.  Per daughter, anxiety level is not as bad today but comes and goes.  We did discuss that steroids she is currently taking and tapering off of don't help with anxiety but some of her anxiety should lessen once steroids are completed.  She has [facility] care coming out, daughter had to take her granddaughter to the ED and missed call from Palliative, states she was told she could speak with them this evening.  There has been discussion of hospice care but they have not met with anyone as of yet to discuss further.  Per daughter, they are doing ok at the moment with no new issues.  Expressed if any issues or needs to feel free to reach out, otherwise will follow up with them again next week.     Plan of care updates since last contact:  None       Advance Care Planning:   Does patient have an Advance Directive: reviewed during previous call, see note. .    Medication Review:  Full medication reconciliation completed during previous call.    Remote Patient Monitoring:  Offered patient enrollment in the Remote Patient Monitoring (RPM) program for in-home monitoring: Yes, patient enrolled; current status is activated and monitoring.    Assessments:  Care Transitions Subsequent and Final Call

## 2025-02-21 ENCOUNTER — CARE COORDINATION (OUTPATIENT)
Dept: CASE MANAGEMENT | Age: 80
End: 2025-02-21

## 2025-02-21 ENCOUNTER — TELEPHONE (OUTPATIENT)
Dept: PRIMARY CARE CLINIC | Age: 80
End: 2025-02-21

## 2025-02-21 NOTE — CARE COORDINATION
-Remote Alert Monitoring Note      Date/Time:  2025 9:25 AM  Patient Current Location: Home: [address] [place] [id number]  Verified patients name and  as identifiers.    Rpm alert to be reviewed by the provider   red alert  blood pressure reading (193/118)  Vitals Recheck blood pressure reading (177/101)  Additional needs to be addressed by provider:  spoke to patient daughter and patient she denies chest pain, SOB,dizziness states she is tired, has no pain, all morning medications taken, repeat BP is lower but still increased, patient has no agitation per daughter and no other concerns at present time                   LPN contacted patient by telephone regarding red alert received   Background: COPD  Refer to 911 immediately if:  Patient unresponsive or unable to provide history  Change in cognition or sudden confusion  Patient unable to respond in complete sentences  Intense chest pain/tightness  Any concern for any clinical emergency  Red Alert: Provider response time of 1 hr required for any red alert requiring intervention  Yellow Alert: Provider response time of 3hr required for any escalated yellow alert  Patient Chief Complaint:  Blood Pressure BP Triage  Are you having any Chest Pain? no   Are you having any Shortness of Breath? no   Do you have a headache or have any vision changes? no   Are you having any numbness or tingling? no   Are you having any other health concerns or issues? FATIGUE  Patient/Caregiver educated on how to properly take a blood pressure. Patient/Caregiver verbalizes understanding.     Clinical Interventions: spoke to patient daughter and patient she denies chest pain, SOB,dizziness states she is tired, has no pain, all morning medications taken, repeat BP is lower but still increased, patient has no agitation per daughter and no other concerns at present time   Plan/Follow Up: Will continue to review, monitor and address alerts with follow up based on severity of

## 2025-02-21 NOTE — CARE COORDINATION
-Remote Alert Monitoring Note      Date/Time:  2025 8:19 AM  Patient Current Location: Home: [address] [place] [id number]  Verified patients name and  as identifiers.    Rpm alert to be reviewed by the provider   red alert  blood pressure reading (188/103)  Vitals Recheck blood pressure reading (196/91)  Additional needs to be addressed by provider:                    LPN contacted patient by telephone regarding red alert received   Background: COPD  Refer to 911 immediately if:  Patient unresponsive or unable to provide history  Change in cognition or sudden confusion  Patient unable to respond in complete sentences  Intense chest pain/tightness  Any concern for any clinical emergency  Red Alert: Provider response time of 1 hr required for any red alert requiring intervention  Yellow Alert: Provider response time of 3hr required for any escalated yellow alert  Patient Chief Complaint:  Blood Pressure BP Triage  Are you having any Chest Pain? no   Are you having any Shortness of Breath? no   Do you have a headache or have any vision changes? no   Are you having any numbness or tingling? no   Are you having any other health concerns or issues? no  Patient/Caregiver educated on how to properly take a blood pressure. Patient/Caregiver verbalizes understanding.     Clinical Interventions: Reviewed and followed up on alerts and treatments-Spoke to patient daughter she states she is doing fine this am, she does not seem agitated which is usually why her BP rises. She is eating breakfast and has taken all her medication, daughter wants to wait another 30 minutes then repeat BP if BP still elevated she is okay with sending to BP for outreach will await new reading     Plan/Follow Up: Will continue to review, monitor and address alerts with follow up based on severity of symptoms and risk factors.  **For any new or worsening symptoms or you are concerned in anyway, please contact your Provider or report to the

## 2025-02-21 NOTE — TELEPHONE ENCOUNTER
02/21/25 9:37 AM    REMOTE PATIENT MONITORING ALERT RESPONSE         ASSESSMENT AND PLAN   HTN  Asymptomatic  HTN not part of RPM care plan, but has a dx of HTN and on medications for treatment.   ACM: please continue to monitor BP metrics and coordinate with PCP for possible office visit for tighter BP control if high readings persist.   Continue to monitor with RPM    CHIEF COMPLAINT    Chief Complaint   Patient presents with    RPM alert     High BP  177/101/87 most recent  196/91/84  188/103/81    HPI    Myrna Smiley is a 79 y.o. female who is enrolled in [facility] for COPD. Patient has documented history of HTN, which is not part of her [facility] care plan. Overall her BP has been 150-160s/90 on RPM for the past 3 days, today she is much higher.   I have reviewed recent labs, office and hospital notes, and [facility] LPN triage note. This is day 4 of monitoring.        CURRENT MEDICATIONS    Current Outpatient Rx   Medication Sig Dispense Refill    amLODIPine (NORVASC) 5 MG tablet Take 1 tablet by mouth daily 90 tablet 1    hydrOXYzine HCl (ATARAX) 10 MG tablet Take 2.5 tablets by mouth every 8 hours as needed for Itching 30 tablet 1    ipratropium 0.5 mg-albuterol 2.5 mg (DUONEB) 0.5-2.5 (3) MG/3ML SOLN nebulizer solution Inhale 3 mLs into the lungs 4 times daily 360 mL 3    Benzocaine-Menthol (CEPACOL) 6-10 MG LOZG lozenge Take 1 lozenge by mouth every 2 hours as needed for Sore Throat 60 lozenge 0    predniSONE (DELTASONE) 10 MG tablet Take 3 tablets together daily for 3 days, then 2 tablets daily for 3 days, then 1 tablet daily for 3 days. 18 tablet 0    busPIRone (BUSPAR) 15 MG tablet Take 15 mg by mouth 2 times daily 60 tablet 3    albuterol sulfate HFA (PROVENTIL;VENTOLIN;PROAIR) 108 (90 Base) MCG/ACT inhaler INHALE 2 PUFFS INTO THE LUNGS EVERY 4 HOURS AS NEEDED FOR WHEEZING 1 each 5    pantoprazole (PROTONIX) 40 MG tablet TAKE 1 TABLET BY MOUTH EVERY MORNING AND 1 TABLET EVERY EVENING 180 tablet 1    levothyroxine

## 2025-02-24 ENCOUNTER — CARE COORDINATION (OUTPATIENT)
Dept: CARE COORDINATION | Age: 80
End: 2025-02-24

## 2025-02-24 NOTE — CARE COORDINATION
Remote Alert Monitoring Note      Date/Time:  2025 10:29 AM  Patient Current Location: Home: [address] [place] [id number]    LPN contacted patient's daughter Nadya by telephone regarding yellow alert received for blood pressure reading (175/96). Verified patients name and  as identifiers.    Rpm alert to be reviewed by the provider                         Background: COPD     Refer to 911 immediately if:  Patient unresponsive or unable to provide history  Change in cognition or sudden confusion  Patient unable to respond in complete sentences  Intense chest pain/tightness  Any concern for any clinical emergency  Red Alert: Provider response time of 1 hr required for any red alert requiring intervention  Yellow Alert: Provider response time of 3hr required for any escalated yellow alert        Blood Pressure BP Triage  Are you having any Chest Pain? no   Are you having any Shortness of Breath? no   Do you have a headache or have any vision changes? no   Are you having any numbness or tingling? no   Are you having any other health concerns or issues? no  Patient/Caregiver educated on how to properly take a blood pressure. Patient/Caregiver verbalizes understanding.     Have you taken your medications as instructed by your doctor today? Yes       Clinical Interventions: Reviewed and followed up on alerts and treatments-. Pt is asymptomatic and in no apparent distress.  Nadya states she just left the patients home, but will be returning. She states patient took medications at 5am and is currently having breakfast. She reports [facility] nurse should be coming to visit today as well. Nadya agreeable to recheck patients BP hen she returns after allowing patient to rest for 15-20  minutes. Will await update.    **For any new or worsening symptoms or you are concerned in anyway, please contact your Provider or report to the nearest Emergency Room.**    Plan/Follow Up: Will continue to review, monitor and address

## 2025-02-25 VITALS — SYSTOLIC BLOOD PRESSURE: 135 MMHG | HEART RATE: 83 BPM | OXYGEN SATURATION: 97 % | DIASTOLIC BLOOD PRESSURE: 88 MMHG

## 2025-02-26 ENCOUNTER — CARE COORDINATION (OUTPATIENT)
Dept: PRIMARY CARE CLINIC | Age: 80
End: 2025-02-26

## 2025-02-26 ENCOUNTER — CARE COORDINATION (OUTPATIENT)
Dept: CARE COORDINATION | Age: 80
End: 2025-02-26

## 2025-02-26 DIAGNOSIS — J44.9 CHRONIC OBSTRUCTIVE PULMONARY DISEASE, UNSPECIFIED COPD TYPE (HCC): Primary | Chronic | ICD-10-CM

## 2025-02-26 NOTE — CARE COORDINATION
Myrna Smiley  2/26/25    Care Coordination  placed call to  Nadya  , the pt's daughter, to arrange RPM kit  through [employer].     CCSS spoke to Caregiver reviewed with Caregiver how to pack equipment in original packing. [employer] will  equipment in 2-4 days.   All questions and concerns answered.

## 2025-02-26 NOTE — CARE COORDINATION
Care Transitions Note    Follow Up Call     Patient Current Location:  Home: [address] [place] [id number]    Care Transition Nurse contacted the family, daughterNadya  by telephone. Verified name and  as identifiers.    Additional needs identified to be addressed with provider   No needs identified                 Method of communication with provider: none.    Care Summary Note: Daughter called late in day to inform CTN nurse that patient is now under hospice care.  Will send message to RPM team for discontinuation.      Plan of care updates since last contact:  None       Advance Care Planning:   Does patient have an Advance Directive: reviewed during previous call, see note. .    Medication Review:  No changes since last call.     Remote Patient Monitoring:  Offered patient enrollment in the Remote Patient Monitoring (RPM) program for in-home monitoring: Patient being discharged from remote patient monitoring program today.    Assessments:  Care Transitions Subsequent and Final Call    Subsequent and Final Calls  Are you currently active with any services?: Home Health  Care Transitions Interventions  Other Interventions:              Follow Up Appointment:   Patient now with hospice services  Future Appointments         Provider Specialty Dept Phone    2025 3:30 PM Dejon Guan MD Pulmonology [phone]    2025 10:00 AM Elizabeth Rick MD Internal Medicine [phone]            Patient closed (entered hospice services) from the Care Transitions program on .    Handoff:   Patient was not referred to the ACM team due to  under hospice care.      Patient has agreed to contact primary care provider and/or specialist for any further questions, concerns, or needs.    Raisa Crowe RN

## 2025-02-26 NOTE — PROGRESS NOTES
Remote Patient Order Discontinued    Received request from Raisa Crowe RN   to discontinue order for remote patient monitoring of COPD and order completed.

## 2025-03-03 NOTE — TELEPHONE ENCOUNTER
Patient has switched pharmacies and is requesting 90 day supply. [Well Developed] : well developed [Well Nourished] : well nourished [No Acute Distress] : no acute distress [Normal Conjunctiva] : normal conjunctiva [Normal Venous Pressure] : normal venous pressure [No Carotid Bruit] : no carotid bruit [Normal S1, S2] : normal S1, S2 [No Murmur] : no murmur [No Rub] : no rub [No Gallop] : no gallop [Clear Lung Fields] : clear lung fields [Good Air Entry] : good air entry [No Respiratory Distress] : no respiratory distress  [Soft] : abdomen soft [Non Tender] : non-tender [No Masses/organomegaly] : no masses/organomegaly [Normal Bowel Sounds] : normal bowel sounds [Normal Gait] : normal gait [No Cyanosis] : no cyanosis [No Clubbing] : no clubbing [No Varicosities] : no varicosities [No Rash] : no rash [No Skin Lesions] : no skin lesions [Moves all extremities] : moves all extremities [No Focal Deficits] : no focal deficits [Normal Speech] : normal speech [Alert and Oriented] : alert and oriented [Normal memory] : normal memory [de-identified] : 1+ pitting edema b/l LE ankle

## 2025-04-18 ENCOUNTER — PATIENT MESSAGE (OUTPATIENT)
Dept: FAMILY MEDICINE CLINIC | Age: 80
End: 2025-04-18

## (undated) DEVICE — ENDO KIT W/SYRINGE: Brand: MEDLINE INDUSTRIES, INC.

## (undated) DEVICE — BITEBLOCK 54FR W/ DENT RIM BLOX

## (undated) DEVICE — GLOVE ORANGE PI 7   MSG9070

## (undated) DEVICE — DEFENDO AIR WATER SUCTION AND BIOPSY VALVE KIT FOR  OLYMPUS: Brand: DEFENDO AIR/WATER/SUCTION AND BIOPSY VALVE

## (undated) DEVICE — FORCEPS BX L240CM WRK CHN 2.8MM STD CAP W/ NDL MIC MESH